# Patient Record
Sex: FEMALE | Race: WHITE | Employment: OTHER | URBAN - METROPOLITAN AREA
[De-identification: names, ages, dates, MRNs, and addresses within clinical notes are randomized per-mention and may not be internally consistent; named-entity substitution may affect disease eponyms.]

---

## 2017-03-02 LAB
ANTIBODY: REACTIVE
BUN BLDV-MCNC: 17 MG/DL
CALCIUM SERPL-MCNC: 9.3 MG/DL
CHLORIDE BLD-SCNC: 104 MMOL/L
CHOLESTEROL, TOTAL: 183 MG/DL
CHOLESTEROL/HDL RATIO: 3.7
CO2: 27 MMOL/L
CREAT SERPL-MCNC: 0.67 MG/DL
GFR CALCULATED: >60
GLUCOSE BLD-MCNC: 89 MG/DL
HDLC SERPL-MCNC: 50 MG/DL (ref 35–70)
LDL CHOLESTEROL CALCULATED: 108 MG/DL (ref 0–160)
POTASSIUM SERPL-SCNC: 3.9 MMOL/L
SODIUM BLD-SCNC: 139 MMOL/L
TRIGL SERPL-MCNC: 124 MG/DL
VLDLC SERPL CALC-MCNC: 25 MG/DL

## 2017-03-03 DIAGNOSIS — E78.5 HYPERLIPIDEMIA, UNSPECIFIED HYPERLIPIDEMIA TYPE: Chronic | ICD-10-CM

## 2017-03-03 DIAGNOSIS — I10 ESSENTIAL HYPERTENSION: ICD-10-CM

## 2017-03-07 DIAGNOSIS — Z13.9 SCREENING: ICD-10-CM

## 2017-03-17 ENCOUNTER — TELEPHONE (OUTPATIENT)
Dept: FAMILY MEDICINE CLINIC | Age: 62
End: 2017-03-17

## 2017-03-17 DIAGNOSIS — R76.8 POSITIVE HEPATITIS C ANTIBODY TEST: Primary | ICD-10-CM

## 2017-03-20 LAB — ANTIBODY: REACTIVE

## 2017-03-22 DIAGNOSIS — R76.8 POSITIVE HEPATITIS C ANTIBODY TEST: ICD-10-CM

## 2017-03-27 ENCOUNTER — OFFICE VISIT (OUTPATIENT)
Dept: FAMILY MEDICINE CLINIC | Age: 62
End: 2017-03-27
Payer: COMMERCIAL

## 2017-03-27 VITALS
HEART RATE: 80 BPM | DIASTOLIC BLOOD PRESSURE: 76 MMHG | SYSTOLIC BLOOD PRESSURE: 122 MMHG | WEIGHT: 195.6 LBS | BODY MASS INDEX: 38.2 KG/M2

## 2017-03-27 DIAGNOSIS — E78.5 HYPERLIPIDEMIA, UNSPECIFIED HYPERLIPIDEMIA TYPE: Primary | Chronic | ICD-10-CM

## 2017-03-27 DIAGNOSIS — I10 ESSENTIAL HYPERTENSION: ICD-10-CM

## 2017-03-27 DIAGNOSIS — J45.20 MILD INTERMITTENT ASTHMA WITHOUT COMPLICATION: ICD-10-CM

## 2017-03-27 PROCEDURE — 99214 OFFICE O/P EST MOD 30 MIN: CPT

## 2017-03-27 RX ORDER — OMEPRAZOLE 20 MG/1
40 CAPSULE, DELAYED RELEASE ORAL DAILY
COMMUNITY
Start: 2016-12-07 | End: 2018-05-07 | Stop reason: SDUPTHER

## 2017-03-27 ASSESSMENT — ENCOUNTER SYMPTOMS
FREQUENT THROAT CLEARING: 0
GASTROINTESTINAL NEGATIVE: 1
DIFFICULTY BREATHING: 0
SHORTNESS OF BREATH: 0
CHEST TIGHTNESS: 0
HOARSE VOICE: 0
BLURRED VISION: 0
COUGH: 0
WHEEZING: 0

## 2017-09-06 ENCOUNTER — OFFICE VISIT (OUTPATIENT)
Dept: FAMILY MEDICINE CLINIC | Age: 62
End: 2017-09-06
Payer: COMMERCIAL

## 2017-09-06 VITALS
DIASTOLIC BLOOD PRESSURE: 74 MMHG | HEIGHT: 61 IN | BODY MASS INDEX: 37.31 KG/M2 | HEART RATE: 86 BPM | SYSTOLIC BLOOD PRESSURE: 122 MMHG | OXYGEN SATURATION: 97 % | WEIGHT: 197.6 LBS

## 2017-09-06 DIAGNOSIS — J45.20 MILD INTERMITTENT ASTHMA WITHOUT COMPLICATION: ICD-10-CM

## 2017-09-06 DIAGNOSIS — J01.90 ACUTE BACTERIAL SINUSITIS: Primary | ICD-10-CM

## 2017-09-06 DIAGNOSIS — B96.89 ACUTE BACTERIAL SINUSITIS: Primary | ICD-10-CM

## 2017-09-06 DIAGNOSIS — R01.1 CARDIAC MURMUR: ICD-10-CM

## 2017-09-06 DIAGNOSIS — T78.2XXS ANAPHYLAXIS, SEQUELA: Chronic | ICD-10-CM

## 2017-09-06 PROBLEM — T78.2XXA ANAPHYLAXIS: Chronic | Status: ACTIVE | Noted: 2017-09-06

## 2017-09-06 PROCEDURE — 99214 OFFICE O/P EST MOD 30 MIN: CPT | Performed by: FAMILY MEDICINE

## 2017-09-06 RX ORDER — BUDESONIDE AND FORMOTEROL FUMARATE DIHYDRATE 160; 4.5 UG/1; UG/1
2 AEROSOL RESPIRATORY (INHALATION) 2 TIMES DAILY
COMMUNITY
End: 2018-01-05 | Stop reason: ALTCHOICE

## 2017-09-06 RX ORDER — AMOXICILLIN AND CLAVULANATE POTASSIUM 875; 125 MG/1; MG/1
1 TABLET, FILM COATED ORAL EVERY 12 HOURS
COMMUNITY
Start: 2017-09-02 | End: 2017-09-12

## 2017-09-06 ASSESSMENT — PATIENT HEALTH QUESTIONNAIRE - PHQ9
SUM OF ALL RESPONSES TO PHQ9 QUESTIONS 1 & 2: 0
SUM OF ALL RESPONSES TO PHQ QUESTIONS 1-9: 0
1. LITTLE INTEREST OR PLEASURE IN DOING THINGS: 0
2. FEELING DOWN, DEPRESSED OR HOPELESS: 0

## 2017-09-19 RX ORDER — POTASSIUM CHLORIDE 750 MG/1
10 CAPSULE, EXTENDED RELEASE ORAL 2 TIMES DAILY
Qty: 180 CAPSULE | Refills: 1 | Status: SHIPPED | OUTPATIENT
Start: 2017-09-19 | End: 2018-06-04 | Stop reason: SDUPTHER

## 2017-09-19 RX ORDER — HYDROCHLOROTHIAZIDE 50 MG/1
50 TABLET ORAL DAILY
Qty: 90 TABLET | Refills: 1 | Status: SHIPPED | OUTPATIENT
Start: 2017-09-19 | End: 2018-06-04 | Stop reason: SDUPTHER

## 2017-09-19 RX ORDER — LISINOPRIL 5 MG/1
5 TABLET ORAL DAILY
Qty: 90 TABLET | Refills: 1 | Status: SHIPPED | OUTPATIENT
Start: 2017-09-19 | End: 2018-06-04 | Stop reason: SDUPTHER

## 2017-09-21 LAB
ANTIBODY: NONREACTIVE
BUN BLDV-MCNC: 18 MG/DL
CALCIUM SERPL-MCNC: 9.6 MG/DL
CHLORIDE BLD-SCNC: 101 MMOL/L
CHOLESTEROL, TOTAL: 198 MG/DL
CHOLESTEROL/HDL RATIO: 3.4
CO2: 32 MMOL/L
CREAT SERPL-MCNC: 0.73 MG/DL
GFR CALCULATED: >60
GLUCOSE BLD-MCNC: 86 MG/DL
HDLC SERPL-MCNC: 59 MG/DL (ref 35–70)
LDL CHOLESTEROL CALCULATED: 122 MG/DL (ref 0–160)
POTASSIUM SERPL-SCNC: 3.5 MMOL/L
SODIUM BLD-SCNC: 140 MMOL/L
TRIGL SERPL-MCNC: 84 MG/DL
VLDLC SERPL CALC-MCNC: 17 MG/DL

## 2017-09-25 DIAGNOSIS — E78.5 HYPERLIPIDEMIA, UNSPECIFIED HYPERLIPIDEMIA TYPE: Chronic | ICD-10-CM

## 2017-09-25 DIAGNOSIS — I10 ESSENTIAL HYPERTENSION: ICD-10-CM

## 2017-09-27 ENCOUNTER — OFFICE VISIT (OUTPATIENT)
Dept: FAMILY MEDICINE CLINIC | Age: 62
End: 2017-09-27
Payer: COMMERCIAL

## 2017-09-27 VITALS
WEIGHT: 198 LBS | HEART RATE: 85 BPM | BODY MASS INDEX: 38.03 KG/M2 | DIASTOLIC BLOOD PRESSURE: 70 MMHG | SYSTOLIC BLOOD PRESSURE: 140 MMHG

## 2017-09-27 DIAGNOSIS — I10 ESSENTIAL HYPERTENSION: Primary | ICD-10-CM

## 2017-09-27 DIAGNOSIS — E78.5 HYPERLIPIDEMIA, UNSPECIFIED HYPERLIPIDEMIA TYPE: Chronic | ICD-10-CM

## 2017-09-27 DIAGNOSIS — Z23 IMMUNIZATION DUE: ICD-10-CM

## 2017-09-27 PROCEDURE — 99213 OFFICE O/P EST LOW 20 MIN: CPT | Performed by: FAMILY MEDICINE

## 2017-09-27 PROCEDURE — 90471 IMMUNIZATION ADMIN: CPT | Performed by: FAMILY MEDICINE

## 2017-09-27 PROCEDURE — 90688 IIV4 VACCINE SPLT 0.5 ML IM: CPT | Performed by: FAMILY MEDICINE

## 2017-09-27 ASSESSMENT — ENCOUNTER SYMPTOMS
COUGH: 0
NAUSEA: 0
DIARRHEA: 0
BACK PAIN: 0
SHORTNESS OF BREATH: 0
SORE THROAT: 0
SINUS PRESSURE: 0
VOMITING: 0

## 2017-10-13 ENCOUNTER — OFFICE VISIT (OUTPATIENT)
Dept: FAMILY MEDICINE CLINIC | Age: 62
End: 2017-10-13
Payer: COMMERCIAL

## 2017-10-13 VITALS
HEART RATE: 80 BPM | DIASTOLIC BLOOD PRESSURE: 70 MMHG | BODY MASS INDEX: 37.46 KG/M2 | WEIGHT: 195 LBS | SYSTOLIC BLOOD PRESSURE: 130 MMHG

## 2017-10-13 DIAGNOSIS — E78.5 HYPERLIPIDEMIA, UNSPECIFIED HYPERLIPIDEMIA TYPE: Chronic | ICD-10-CM

## 2017-10-13 DIAGNOSIS — S29.011A INTERCOSTAL MUSCLE STRAIN, INITIAL ENCOUNTER: Primary | ICD-10-CM

## 2017-10-13 PROCEDURE — 99213 OFFICE O/P EST LOW 20 MIN: CPT | Performed by: FAMILY MEDICINE

## 2017-10-13 ASSESSMENT — ENCOUNTER SYMPTOMS
COUGH: 0
SORE THROAT: 0
VOMITING: 0
SHORTNESS OF BREATH: 0
DIARRHEA: 0
SINUS PRESSURE: 0
BACK PAIN: 0
NAUSEA: 0

## 2017-10-13 NOTE — PROGRESS NOTES
Marta Talavera is a 57 y.o. female who presents today for her medical conditions/complaints as noted below. Marta Talavera is c/o of Back Pain (left upper outer quadrant)  Having some pain in back thoracic region gets episode of brief sharp pains lasting seconds. During these she has a lot of pain with deep inspiration. HPI:     Visit Information    Have you changed or started any medications since your last visit including any over-the-counter medicines, vitamins, or herbal medicines? no   Are you having any side effects from any of your medications? -  no  Have you stopped taking any of your medications? Is so, why? -  no    Have you seen any other physician or provider since your last visit? No  Have you had any other diagnostic tests since your last visit? No  Have you been seen in the emergency room and/or had an admission to a hospital since we last saw you? No  Have you had your routine dental cleaning in the past 6 months? yes -     Have you activated your [employer] account? If not, what are your barriers?  Yes     Patient Care Team:  Kathryn Beltran MD as PCP - General (Family Medicine)  Kathy Payan (Ophthalmology)  iSerra Cordoba MD as Consulting Physician (Rheumatology)  Joshua Tam MD as Consulting Physician (Gastroenterology)    Medical History Review  Past Medical, Family, and Social History reviewed and  contribute to the patient presenting condition    Health Maintenance   Topic Date Due    DTaP/Tdap/Td vaccine (1 - Tdap) 03/25/1972    Zostavax vaccine  03/25/2013    Cervical cancer screen  12/30/2017    Breast cancer screen  07/12/2018    Lipid screen  09/21/2022    Colon cancer screen colonoscopy  02/10/2026    Flu vaccine  Completed    Pneumococcal med risk  Completed    Hepatitis C screen  Completed    HIV screen  Completed       Past Medical History:   Diagnosis Date    Asthma     Hypertension     OA (osteoarthritis)     Obesity       Past Surgical History: Procedure Laterality Date    COLONOSCOPY      2006; 2016; due 2026     No family history on file. Social History   Substance Use Topics    Smoking status: Never Smoker    Smokeless tobacco: Never Used    Alcohol use Yes      Comment: rare       Current Outpatient Prescriptions   Medication Sig Dispense Refill    Lactobacillus (PROBIOTIC ACIDOPHILUS PO) Take by mouth      hydrochlorothiazide (HYDRODIURIL) 50 MG tablet Take 1 tablet by mouth daily 90 tablet 1    potassium chloride (MICRO-K) 10 MEQ extended release capsule Take 1 capsule by mouth 2 times daily 180 capsule 1    lisinopril (PRINIVIL;ZESTRIL) 5 MG tablet Take 1 tablet by mouth daily 90 tablet 1    budesonide-formoterol (SYMBICORT) 160-4.5 MCG/ACT AERO Inhale 2 puffs into the lungs 2 times daily      B Complex Vitamins (VITAMIN B COMPLEX PO) Take 1 capsule by mouth daily      omeprazole (PRILOSEC) 20 MG delayed release capsule Take 40 mg by mouth daily      EPIPEN 2-AMRITA 0.3 MG/0.3ML SOAJ injection prn      albuterol (PROVENTIL HFA) 108 (90 BASE) MCG/ACT inhaler Inhale 2 puffs into the lungs every 6 hours as needed for Wheezing 3 Inhaler 3     No current facility-administered medications for this visit. Allergies   Allergen Reactions    Daypro [Oxaprozin]     Tetracyclines & Related     Erythromycin Rash         Subjective:   Review of Systems   Constitutional: Negative for chills, diaphoresis and fever. HENT: Negative for congestion, sinus pressure and sore throat. Eyes: Negative for visual disturbance. Respiratory: Negative for cough and shortness of breath. Cardiovascular: Negative for chest pain and leg swelling. Gastrointestinal: Negative for diarrhea, nausea and vomiting. Genitourinary: Negative for dysuria, menstrual problem, urgency and vaginal discharge. Musculoskeletal: Negative for arthralgias, back pain and myalgias. Skin: Negative for rash. Neurological: Negative for weakness, numbness and headaches.

## 2017-11-16 DIAGNOSIS — R01.1 CARDIAC MURMUR: ICD-10-CM

## 2018-01-05 ENCOUNTER — OFFICE VISIT (OUTPATIENT)
Dept: FAMILY MEDICINE CLINIC | Age: 63
End: 2018-01-05
Payer: COMMERCIAL

## 2018-01-05 VITALS
SYSTOLIC BLOOD PRESSURE: 122 MMHG | HEART RATE: 92 BPM | DIASTOLIC BLOOD PRESSURE: 60 MMHG | BODY MASS INDEX: 40.34 KG/M2 | WEIGHT: 210 LBS

## 2018-01-05 DIAGNOSIS — E78.5 HYPERLIPIDEMIA, UNSPECIFIED HYPERLIPIDEMIA TYPE: Chronic | ICD-10-CM

## 2018-01-05 DIAGNOSIS — I10 ESSENTIAL HYPERTENSION: ICD-10-CM

## 2018-01-05 DIAGNOSIS — H65.02 ACUTE SEROUS OTITIS MEDIA OF LEFT EAR, RECURRENCE NOT SPECIFIED: ICD-10-CM

## 2018-01-05 DIAGNOSIS — H92.02 LEFT EAR PAIN: Primary | ICD-10-CM

## 2018-01-05 PROCEDURE — 99213 OFFICE O/P EST LOW 20 MIN: CPT | Performed by: FAMILY MEDICINE

## 2018-01-05 RX ORDER — AMOXICILLIN 875 MG/1
875 TABLET, COATED ORAL 2 TIMES DAILY
Qty: 20 TABLET | Refills: 0 | Status: SHIPPED | OUTPATIENT
Start: 2018-01-05 | End: 2018-01-15

## 2018-01-05 ASSESSMENT — ENCOUNTER SYMPTOMS
SINUS PRESSURE: 0
COUGH: 0
NAUSEA: 0
VOMITING: 0
SHORTNESS OF BREATH: 0
DIARRHEA: 0
SORE THROAT: 0
BACK PAIN: 0

## 2018-01-05 NOTE — PROGRESS NOTES
COLONOSCOPY      2006; 2016; due 2026     No family history on file. Social History   Substance Use Topics    Smoking status: Never Smoker    Smokeless tobacco: Never Used    Alcohol use Yes      Comment: rare       Current Outpatient Prescriptions   Medication Sig Dispense Refill    amoxicillin (AMOXIL) 875 MG tablet Take 1 tablet by mouth 2 times daily for 10 days 20 tablet 0    Lactobacillus (PROBIOTIC ACIDOPHILUS PO) Take by mouth      hydrochlorothiazide (HYDRODIURIL) 50 MG tablet Take 1 tablet by mouth daily 90 tablet 1    potassium chloride (MICRO-K) 10 MEQ extended release capsule Take 1 capsule by mouth 2 times daily 180 capsule 1    lisinopril (PRINIVIL;ZESTRIL) 5 MG tablet Take 1 tablet by mouth daily 90 tablet 1    B Complex Vitamins (VITAMIN B COMPLEX PO) Take 1 capsule by mouth daily      omeprazole (PRILOSEC) 20 MG delayed release capsule Take 40 mg by mouth daily      EPIPEN 2-AMRITA 0.3 MG/0.3ML SOAJ injection prn      albuterol (PROVENTIL HFA) 108 (90 BASE) MCG/ACT inhaler Inhale 2 puffs into the lungs every 6 hours as needed for Wheezing 3 Inhaler 3     No current facility-administered medications for this visit. Allergies   Allergen Reactions    Daypro [Oxaprozin]     Tetracyclines & Related     Erythromycin Rash         Subjective:   Review of Systems   Constitutional: Negative for chills, diaphoresis and fever. HENT: Positive for ear pain and hearing loss. Negative for congestion, sinus pressure and sore throat. Eyes: Negative for visual disturbance. Respiratory: Negative for cough and shortness of breath. Cardiovascular: Negative for chest pain and leg swelling. Gastrointestinal: Negative for diarrhea, nausea and vomiting. Genitourinary: Negative for dysuria, menstrual problem, urgency and vaginal discharge. Musculoskeletal: Negative for arthralgias, back pain and myalgias. Skin: Negative for rash.    Neurological: Negative for weakness, numbness and headaches. Psychiatric/Behavioral: Negative for sleep disturbance. Objective:   /60   Pulse 92   Wt 210 lb (95.3 kg)   BMI 40.34 kg/m²     Physical Exam   Constitutional: She is oriented to person, place, and time. She appears well-developed and well-nourished. No distress. HENT:   Head: Normocephalic and atraumatic. Right Ear: External ear normal. Tympanic membrane is not injected, not scarred, not retracted and not bulging. No middle ear effusion. Left Ear: There is tenderness. No drainage. Tympanic membrane is scarred. Tympanic membrane is not perforated, not erythematous, not retracted and not bulging. A middle ear effusion is present. Mouth/Throat: No oropharyngeal exudate. Eyes: No scleral icterus. Neck: Neck supple. Carotid bruit is not present. Cardiovascular: Exam reveals no gallop and no friction rub. No murmur heard. Pulmonary/Chest: No respiratory distress. She has no wheezes. She has no rales. She exhibits no tenderness. Musculoskeletal: She exhibits no edema. Lymphadenopathy:     She has no cervical adenopathy. Neurological: She is alert and oriented to person, place, and time. No cranial nerve deficit. Skin: No rash noted. She is not diaphoretic. Psychiatric: She has a normal mood and affect. Her behavior is normal. Judgment and thought content normal.       Assessment:      1. Left ear pain  amoxicillin (AMOXIL) 875 MG tablet   2. Acute serous otitis media of left ear, recurrence not specified  amoxicillin (AMOXIL) 875 MG tablet   3. Hyperlipidemia, unspecified hyperlipidemia type  CBC Auto Differential    Comprehensive Metabolic Panel    Lipid Panel   4. Essential hypertension           Plan:      Return in about 6 months (around 7/5/2018).     Orders Placed This Encounter   Procedures    CBC Auto Differential     Standing Status:   Future     Standing Expiration Date:   1/5/2019    Comprehensive Metabolic Panel     Standing Status:   Future     Standing

## 2018-02-27 ENCOUNTER — OFFICE VISIT (OUTPATIENT)
Dept: FAMILY MEDICINE CLINIC | Age: 63
End: 2018-02-27
Payer: COMMERCIAL

## 2018-02-27 VITALS
BODY MASS INDEX: 40.72 KG/M2 | WEIGHT: 212 LBS | SYSTOLIC BLOOD PRESSURE: 120 MMHG | HEART RATE: 90 BPM | DIASTOLIC BLOOD PRESSURE: 70 MMHG

## 2018-02-27 DIAGNOSIS — K13.79 ORAL BLEEDING: Primary | ICD-10-CM

## 2018-02-27 PROCEDURE — 99213 OFFICE O/P EST LOW 20 MIN: CPT | Performed by: FAMILY MEDICINE

## 2018-02-27 ASSESSMENT — ENCOUNTER SYMPTOMS
COUGH: 0
BACK PAIN: 0
NAUSEA: 0
DIARRHEA: 0
SINUS PRESSURE: 0
VOMITING: 0
SHORTNESS OF BREATH: 0
SORE THROAT: 0

## 2018-02-27 NOTE — PROGRESS NOTES
Negative for diarrhea, nausea and vomiting. Genitourinary: Negative for dysuria, menstrual problem, urgency and vaginal discharge. Musculoskeletal: Negative for arthralgias, back pain and myalgias. Skin: Negative for rash. Neurological: Negative for weakness, numbness and headaches. Psychiatric/Behavioral: Negative for sleep disturbance. Objective:   /70   Pulse 90   Wt 212 lb (96.2 kg)   BMI 40.72 kg/m²     Physical Exam   Constitutional: She is oriented to person, place, and time. She appears well-developed and well-nourished. No distress. HENT:   Head: Normocephalic and atraumatic. Mouth/Throat: No oral lesions. Normal dentition. No dental abscesses, uvula swelling, lacerations or dental caries. No oropharyngeal exudate. Eyes: No scleral icterus. Neck: Neck supple. Carotid bruit is not present. Cardiovascular: Exam reveals no gallop and no friction rub. No murmur heard. Pulmonary/Chest: No respiratory distress. She has no wheezes. She has no rales. She exhibits no tenderness. Musculoskeletal: She exhibits no edema. Lymphadenopathy:     She has no cervical adenopathy. Neurological: She is alert and oriented to person, place, and time. No cranial nerve deficit. Skin: No rash noted. She is not diaphoretic. Psychiatric: She has a normal mood and affect. Her behavior is normal. Judgment and thought content normal.       Assessment:      1. Oral bleeding  CBC Auto Differential    Sedimentation rate, automated    Protime-INR    APTT         Plan:      Return if symptoms worsen or fail to improve.     Orders Placed This Encounter   Procedures    CBC Auto Differential     Standing Status:   Future     Standing Expiration Date:   2/27/2019    Sedimentation rate, automated     Standing Status:   Future     Standing Expiration Date:   2/27/2019    Protime-INR     Standing Status:   Future     Standing Expiration Date:   2/27/2019     Order Specific Question:   Daily Coumadin Dose?     Answer:   0    APTT     Standing Status:   Future     Standing Expiration Date:   2/27/2019     Order Specific Question:   Daily Heparin Dose? Answer:   0     No orders of the defined types were placed in this encounter. I encouraged her to follow up with her dentist for her bleeding issue and to schedule an exam with her OB/Gyn doctor for her pelvic pain issue.

## 2018-05-07 RX ORDER — OMEPRAZOLE 20 MG/1
40 CAPSULE, DELAYED RELEASE ORAL DAILY
Qty: 30 CAPSULE | Refills: 5 | Status: SHIPPED | OUTPATIENT
Start: 2018-05-07 | End: 2018-06-04 | Stop reason: SDUPTHER

## 2018-06-04 RX ORDER — POTASSIUM CHLORIDE 750 MG/1
10 CAPSULE, EXTENDED RELEASE ORAL 2 TIMES DAILY
Qty: 180 CAPSULE | Refills: 1 | Status: SHIPPED | OUTPATIENT
Start: 2018-06-04 | End: 2018-12-17 | Stop reason: SDUPTHER

## 2018-06-04 RX ORDER — LISINOPRIL 5 MG/1
5 TABLET ORAL DAILY
Qty: 90 TABLET | Refills: 1 | Status: SHIPPED | OUTPATIENT
Start: 2018-06-04 | End: 2018-12-17 | Stop reason: SDUPTHER

## 2018-06-04 RX ORDER — ALBUTEROL SULFATE 90 UG/1
2 AEROSOL, METERED RESPIRATORY (INHALATION) EVERY 6 HOURS PRN
Qty: 3 INHALER | Refills: 3 | Status: SHIPPED | OUTPATIENT
Start: 2018-06-04 | End: 2018-12-17 | Stop reason: SDUPTHER

## 2018-06-04 RX ORDER — OMEPRAZOLE 20 MG/1
40 CAPSULE, DELAYED RELEASE ORAL DAILY
Qty: 30 CAPSULE | Refills: 5 | Status: SHIPPED | OUTPATIENT
Start: 2018-06-04 | End: 2018-12-17 | Stop reason: SDUPTHER

## 2018-06-04 RX ORDER — EPINEPHRINE 0.3 MG/.3ML
INJECTION INTRAMUSCULAR
Qty: 1 EACH | Refills: 2 | Status: SHIPPED | OUTPATIENT
Start: 2018-06-04 | End: 2020-02-24

## 2018-06-04 RX ORDER — HYDROCHLOROTHIAZIDE 50 MG/1
50 TABLET ORAL DAILY
Qty: 90 TABLET | Refills: 1 | Status: SHIPPED | OUTPATIENT
Start: 2018-06-04 | End: 2018-12-17 | Stop reason: SDUPTHER

## 2018-07-10 LAB
ALBUMIN SERPL-MCNC: 4 G/DL
ALP BLD-CCNC: 116 U/L
ALT SERPL-CCNC: 18 U/L
ANION GAP SERPL CALCULATED.3IONS-SCNC: 10 MMOL/L
AST SERPL-CCNC: 18 U/L
BASOPHILS ABSOLUTE: 0.1 /ΜL
BASOPHILS RELATIVE PERCENT: 0.8 %
BILIRUB SERPL-MCNC: 1 MG/DL (ref 0.1–1.4)
BUN BLDV-MCNC: 17 MG/DL
CALCIUM SERPL-MCNC: 9.4 MG/DL
CHLORIDE BLD-SCNC: 102 MMOL/L
CHOLESTEROL, TOTAL: 175 MG/DL
CHOLESTEROL/HDL RATIO: 3.4
CO2: 27 MMOL/L
CREAT SERPL-MCNC: 0.74 MG/DL
EOSINOPHILS ABSOLUTE: 0.3 /ΜL
EOSINOPHILS RELATIVE PERCENT: 4.2 %
GFR CALCULATED: >60
GLUCOSE BLD-MCNC: 89 MG/DL
HCT VFR BLD CALC: 40.4 % (ref 36–46)
HDLC SERPL-MCNC: 51 MG/DL (ref 35–70)
HEMOGLOBIN: 13.7 G/DL (ref 12–16)
LDL CHOLESTEROL CALCULATED: 101 MG/DL (ref 0–160)
LYMPHOCYTES ABSOLUTE: 1.4 /ΜL
LYMPHOCYTES RELATIVE PERCENT: 21.8 %
MCH RBC QN AUTO: 28.7 PG
MCHC RBC AUTO-ENTMCNC: 33.8 G/DL
MCV RBC AUTO: 85 FL
MONOCYTES ABSOLUTE: 0.4 /ΜL
MONOCYTES RELATIVE PERCENT: 6.6 %
NEUTROPHILS ABSOLUTE: 4.3 /ΜL
NEUTROPHILS RELATIVE PERCENT: 66.6 %
PDW BLD-RTO: 13.8 %
PLATELET # BLD: 217 K/ΜL
PMV BLD AUTO: 10.1 FL
POTASSIUM SERPL-SCNC: 3.6 MMOL/L
RBC # BLD: 4.76 10^6/ΜL
SODIUM BLD-SCNC: 139 MMOL/L
TOTAL PROTEIN: 7.3
TRIGL SERPL-MCNC: 117 MG/DL
VLDLC SERPL CALC-MCNC: 23 MG/DL
WBC # BLD: 6.4 10^3/ML

## 2018-07-11 ENCOUNTER — OFFICE VISIT (OUTPATIENT)
Dept: FAMILY MEDICINE CLINIC | Age: 63
End: 2018-07-11
Payer: MEDICARE

## 2018-07-11 VITALS
SYSTOLIC BLOOD PRESSURE: 124 MMHG | WEIGHT: 206 LBS | DIASTOLIC BLOOD PRESSURE: 70 MMHG | HEART RATE: 89 BPM | BODY MASS INDEX: 39.57 KG/M2

## 2018-07-11 DIAGNOSIS — J45.909 MILD ASTHMA WITHOUT COMPLICATION, UNSPECIFIED WHETHER PERSISTENT: ICD-10-CM

## 2018-07-11 DIAGNOSIS — M19.90 OSTEOARTHRITIS, UNSPECIFIED OSTEOARTHRITIS TYPE, UNSPECIFIED SITE: ICD-10-CM

## 2018-07-11 DIAGNOSIS — Z23 IMMUNIZATION DUE: ICD-10-CM

## 2018-07-11 DIAGNOSIS — E78.5 HYPERLIPIDEMIA, UNSPECIFIED HYPERLIPIDEMIA TYPE: Chronic | ICD-10-CM

## 2018-07-11 DIAGNOSIS — I10 ESSENTIAL HYPERTENSION: Primary | ICD-10-CM

## 2018-07-11 PROCEDURE — 90632 HEPA VACCINE ADULT IM: CPT | Performed by: FAMILY MEDICINE

## 2018-07-11 PROCEDURE — 99213 OFFICE O/P EST LOW 20 MIN: CPT | Performed by: FAMILY MEDICINE

## 2018-07-11 PROCEDURE — 90471 IMMUNIZATION ADMIN: CPT | Performed by: FAMILY MEDICINE

## 2018-07-11 ASSESSMENT — ENCOUNTER SYMPTOMS
VOMITING: 0
SORE THROAT: 0
COUGH: 0
DIARRHEA: 0
NAUSEA: 0
SINUS PRESSURE: 0
SHORTNESS OF BREATH: 0
BACK PAIN: 0

## 2018-07-11 NOTE — PROGRESS NOTES
Completed    HIV screen  Completed       Past Medical History:   Diagnosis Date    Asthma     Hypertension     OA (osteoarthritis)     Obesity       Past Surgical History:   Procedure Laterality Date    COLONOSCOPY      2006; 2016; due 2026     No family history on file. Social History   Substance Use Topics    Smoking status: Never Smoker    Smokeless tobacco: Never Used    Alcohol use Yes      Comment: rare       Current Outpatient Prescriptions   Medication Sig Dispense Refill    lisinopril (PRINIVIL;ZESTRIL) 5 MG tablet Take 1 tablet by mouth daily 90 tablet 1    potassium chloride (MICRO-K) 10 MEQ extended release capsule Take 1 capsule by mouth 2 times daily 180 capsule 1    hydrochlorothiazide (HYDRODIURIL) 50 MG tablet Take 1 tablet by mouth daily 90 tablet 1    omeprazole (PRILOSEC) 20 MG delayed release capsule Take 2 capsules by mouth daily (Patient taking differently: Take 20 mg by mouth Daily ) 30 capsule 5    albuterol sulfate HFA (PROVENTIL HFA) 108 (90 Base) MCG/ACT inhaler Inhale 2 puffs into the lungs every 6 hours as needed for Wheezing 3 Inhaler 3    EPIPEN 2-AMRITA 0.3 MG/0.3ML SOAJ injection prn 1 each 2    Lactobacillus (PROBIOTIC ACIDOPHILUS PO) Take by mouth       No current facility-administered medications for this visit. Allergies   Allergen Reactions    Daypro [Oxaprozin]     Tetracyclines & Related     Erythromycin Rash         Subjective:   Review of Systems   Constitutional: Negative for chills, diaphoresis and fever. HENT: Negative for congestion, sinus pressure and sore throat. Eyes: Negative for visual disturbance. Respiratory: Negative for cough and shortness of breath. Cardiovascular: Negative for chest pain and leg swelling. Gastrointestinal: Negative for diarrhea, nausea and vomiting. Genitourinary: Positive for vaginal bleeding. Negative for dysuria, menstrual problem, urgency and vaginal discharge.    Musculoskeletal: Negative for

## 2018-07-23 DIAGNOSIS — I10 ESSENTIAL HYPERTENSION: ICD-10-CM

## 2018-07-23 DIAGNOSIS — E78.5 HYPERLIPIDEMIA, UNSPECIFIED HYPERLIPIDEMIA TYPE: Chronic | ICD-10-CM

## 2018-09-07 ENCOUNTER — TELEPHONE (OUTPATIENT)
Dept: FAMILY MEDICINE CLINIC | Age: 63
End: 2018-09-07

## 2018-09-07 NOTE — TELEPHONE ENCOUNTER
Typhoid vaccine is given in four doses qod for eight days and should be completed at least one week before leaving to at risk area.   Yes malarial prophylaxis can be used but depends on where she is traveling to, for how long, season of year etc.  I really think an appt prior to travel is best to assess all that needs to be done, Rx can be provided etc.

## 2018-10-01 ENCOUNTER — NURSE ONLY (OUTPATIENT)
Dept: FAMILY MEDICINE CLINIC | Age: 63
End: 2018-10-01
Payer: MEDICARE

## 2018-10-01 ENCOUNTER — TELEPHONE (OUTPATIENT)
Dept: FAMILY MEDICINE CLINIC | Age: 63
End: 2018-10-01

## 2018-10-01 DIAGNOSIS — Z23 IMMUNIZATION DUE: Primary | ICD-10-CM

## 2018-10-01 PROCEDURE — 90688 IIV4 VACCINE SPLT 0.5 ML IM: CPT | Performed by: FAMILY MEDICINE

## 2018-10-01 PROCEDURE — G0010 ADMIN HEPATITIS B VACCINE: HCPCS | Performed by: FAMILY MEDICINE

## 2018-10-01 PROCEDURE — G0008 ADMIN INFLUENZA VIRUS VAC: HCPCS | Performed by: FAMILY MEDICINE

## 2018-10-01 PROCEDURE — 90746 HEPB VACCINE 3 DOSE ADULT IM: CPT | Performed by: FAMILY MEDICINE

## 2018-10-01 RX ORDER — DOXYCYCLINE HYCLATE 100 MG
TABLET ORAL
Qty: 30 TABLET | Refills: 0 | Status: SHIPPED | OUTPATIENT
Start: 2018-10-01 | End: 2018-12-11 | Stop reason: ALTCHOICE

## 2018-10-01 NOTE — TELEPHONE ENCOUNTER
Patient is going to [place] the end of October and she needs the medication for the typhoid vaccine and the [facility] can you please call it into the pharmacy please advise

## 2018-10-08 ENCOUNTER — OFFICE VISIT (OUTPATIENT)
Dept: FAMILY MEDICINE CLINIC | Age: 63
End: 2018-10-08
Payer: MEDICARE

## 2018-10-08 VITALS
WEIGHT: 206 LBS | DIASTOLIC BLOOD PRESSURE: 70 MMHG | BODY MASS INDEX: 39.57 KG/M2 | HEART RATE: 79 BPM | SYSTOLIC BLOOD PRESSURE: 124 MMHG

## 2018-10-08 DIAGNOSIS — L30.9 ECZEMA, UNSPECIFIED TYPE: ICD-10-CM

## 2018-10-08 DIAGNOSIS — Z23 IMMUNIZATION DUE: ICD-10-CM

## 2018-10-08 DIAGNOSIS — I10 ESSENTIAL HYPERTENSION: ICD-10-CM

## 2018-10-08 DIAGNOSIS — Z71.84 TRAVEL ADVICE ENCOUNTER: Primary | ICD-10-CM

## 2018-10-08 DIAGNOSIS — A09 TRAVELER'S DIARRHEA: ICD-10-CM

## 2018-10-08 PROCEDURE — 99213 OFFICE O/P EST LOW 20 MIN: CPT | Performed by: FAMILY MEDICINE

## 2018-10-08 PROCEDURE — 90670 PCV13 VACCINE IM: CPT | Performed by: FAMILY MEDICINE

## 2018-10-08 PROCEDURE — G0009 ADMIN PNEUMOCOCCAL VACCINE: HCPCS | Performed by: FAMILY MEDICINE

## 2018-10-08 RX ORDER — FLUOCINONIDE 0.5 MG/G
OINTMENT TOPICAL
Qty: 60 G | Refills: 1 | Status: SHIPPED | OUTPATIENT
Start: 2018-10-08 | End: 2018-10-15

## 2018-10-08 RX ORDER — CIPROFLOXACIN 500 MG/1
500 TABLET, FILM COATED ORAL 2 TIMES DAILY
Qty: 20 TABLET | Refills: 0 | Status: SHIPPED | OUTPATIENT
Start: 2018-10-08 | End: 2018-10-18

## 2018-10-08 ASSESSMENT — PATIENT HEALTH QUESTIONNAIRE - PHQ9
1. LITTLE INTEREST OR PLEASURE IN DOING THINGS: 0
SUM OF ALL RESPONSES TO PHQ QUESTIONS 1-9: 0
SUM OF ALL RESPONSES TO PHQ QUESTIONS 1-9: 0
2. FEELING DOWN, DEPRESSED OR HOPELESS: 0
SUM OF ALL RESPONSES TO PHQ9 QUESTIONS 1 & 2: 0

## 2018-10-08 ASSESSMENT — ENCOUNTER SYMPTOMS
DIARRHEA: 0
SINUS PRESSURE: 0
SHORTNESS OF BREATH: 0
COUGH: 0
SORE THROAT: 0
NAUSEA: 0
BACK PAIN: 0
VOMITING: 0

## 2018-10-08 NOTE — PROGRESS NOTES
Caren Isabel is a 70 y.o. female who presents today for her medical conditions/complaints as noted below. Caren Isabel is c/o of Hypertension and Other (leaving for [place]-would like to discuss vaccines)    Routine follow up for travel consultation. Going to [facility] and Rx have been sent to her pharmacy. HPI:     Visit Information    Have you changed or started any medications since your last visit including any over-the-counter medicines, vitamins, or herbal medicines? no   Are you having any side effects from any of your medications? -  no  Have you stopped taking any of your medications? Is so, why? -  no    Have you seen any other physician or provider since your last visit? No  Have you had any other diagnostic tests since your last visit? No  Have you been seen in the emergency room and/or had an admission to a hospital since we last saw you? No  Have you had your routine dental cleaning in the past 6 months? no    Have you activated your [employer] system account? If not, what are your barriers?  Yes     Patient Care Team:  Prema Jack MD as PCP - General (Family Medicine)  Bruna Xavier (Ophthalmology)  Helen José MD as Consulting Physician (Rheumatology)  Travis Crooks MD as Consulting Physician (Gastroenterology)    Medical History Review  Past Medical, Family, and Social History reviewed and  contribute to the patient presenting condition    Health Maintenance   Topic Date Due    Shingles Vaccine (1 of 2 - 2 Dose Series) 11/11/2010    Cervical cancer screen  12/30/2017    Pneumococcal low/med risk (1 of 2 - PCV13) 03/25/2018    Breast cancer screen  07/12/2018    Potassium monitoring  07/10/2019    Creatinine monitoring  07/10/2019    Lipid screen  07/10/2023    Colon cancer screen colonoscopy  02/10/2026    DTaP/Tdap/Td vaccine (2 - Td) 07/11/2028    DEXA (modify frequency per FRAX score)  Completed    Flu vaccine  Completed    Hepatitis C screen  Completed    HIV screen Completed       Past Medical History:   Diagnosis Date    Asthma     Hypertension     OA (osteoarthritis)     Obesity       Past Surgical History:   Procedure Laterality Date    COLONOSCOPY      2006; 2016; due 2026     No family history on file. Social History   Substance Use Topics    Smoking status: Never Smoker    Smokeless tobacco: Never Used    Alcohol use Yes      Comment: rare       Current Outpatient Prescriptions   Medication Sig Dispense Refill    fluocinonide (LIDEX) 0.05 % ointment Apply topically 2 times daily. 60 g 1    ciprofloxacin (CIPRO) 500 MG tablet Take 1 tablet by mouth 2 times daily for 10 days 20 tablet 0    doxycycline hyclate (VIBRA-TABS) 100 MG tablet Qd start 2 days before leaving then 4 weeks upon return 30 tablet 0    typhoid vaccine (VIVOTIF) delayed release capsule 1 cap qod for 4 doses complete 4 capsule 0    lisinopril (PRINIVIL;ZESTRIL) 5 MG tablet Take 1 tablet by mouth daily 90 tablet 1    potassium chloride (MICRO-K) 10 MEQ extended release capsule Take 1 capsule by mouth 2 times daily 180 capsule 1    hydrochlorothiazide (HYDRODIURIL) 50 MG tablet Take 1 tablet by mouth daily 90 tablet 1    omeprazole (PRILOSEC) 20 MG delayed release capsule Take 2 capsules by mouth daily (Patient taking differently: Take 20 mg by mouth Daily ) 30 capsule 5    albuterol sulfate HFA (PROVENTIL HFA) 108 (90 Base) MCG/ACT inhaler Inhale 2 puffs into the lungs every 6 hours as needed for Wheezing 3 Inhaler 3    EPIPEN 2-AMRITA 0.3 MG/0.3ML SOAJ injection prn 1 each 2    Lactobacillus (PROBIOTIC ACIDOPHILUS PO) Take by mouth       No current facility-administered medications for this visit. Allergies   Allergen Reactions    Daypro [Oxaprozin]     Tetracyclines & Related     Erythromycin Rash         Subjective:   Review of Systems   Constitutional: Negative for chills, diaphoresis and fever. HENT: Negative for congestion, sinus pressure and sore throat.     Eyes: Negative

## 2018-10-12 ENCOUNTER — TELEPHONE (OUTPATIENT)
Dept: FAMILY MEDICINE CLINIC | Age: 63
End: 2018-10-12

## 2018-10-12 RX ORDER — CHLOROQUINE PHOSPHATE 500 MG/1
500 TABLET, FILM COATED ORAL WEEKLY
Qty: 8 TABLET | Refills: 0 | Status: SHIPPED | OUTPATIENT
Start: 2018-10-12 | End: 2018-12-01

## 2018-10-12 NOTE — TELEPHONE ENCOUNTER
Sorry 300 mg is equivalent to the 500 mg 300 mg chloroquine base is equal to 500 mg chloroquine phosphate.

## 2018-12-11 ENCOUNTER — OFFICE VISIT (OUTPATIENT)
Dept: FAMILY MEDICINE CLINIC | Age: 63
End: 2018-12-11
Payer: MEDICARE

## 2018-12-11 VITALS
OXYGEN SATURATION: 95 % | BODY MASS INDEX: 39.19 KG/M2 | SYSTOLIC BLOOD PRESSURE: 122 MMHG | HEART RATE: 82 BPM | RESPIRATION RATE: 16 BRPM | DIASTOLIC BLOOD PRESSURE: 70 MMHG | WEIGHT: 204 LBS

## 2018-12-11 DIAGNOSIS — J45.21 MILD INTERMITTENT ASTHMA WITH ACUTE EXACERBATION: ICD-10-CM

## 2018-12-11 DIAGNOSIS — J06.9 VIRAL URI: Primary | ICD-10-CM

## 2018-12-11 DIAGNOSIS — L75.0 URINARY BODY ODOR: ICD-10-CM

## 2018-12-11 LAB
BILIRUBIN, POC: NORMAL
BLOOD URINE, POC: NORMAL
CLARITY, POC: CLEAR
COLOR, POC: YELLOW
GLUCOSE URINE, POC: NORMAL
KETONES, POC: NORMAL
LEUKOCYTE EST, POC: NORMAL
NITRITE, POC: NORMAL
PH, POC: 6
PROTEIN, POC: NORMAL
SPECIFIC GRAVITY, POC: 1.02
UROBILINOGEN, POC: 0.2

## 2018-12-11 PROCEDURE — G8417 CALC BMI ABV UP PARAM F/U: HCPCS | Performed by: NURSE PRACTITIONER

## 2018-12-11 PROCEDURE — 4040F PNEUMOC VAC/ADMIN/RCVD: CPT | Performed by: NURSE PRACTITIONER

## 2018-12-11 PROCEDURE — 94640 AIRWAY INHALATION TREATMENT: CPT | Performed by: NURSE PRACTITIONER

## 2018-12-11 PROCEDURE — 1090F PRES/ABSN URINE INCON ASSESS: CPT | Performed by: NURSE PRACTITIONER

## 2018-12-11 PROCEDURE — G8482 FLU IMMUNIZE ORDER/ADMIN: HCPCS | Performed by: NURSE PRACTITIONER

## 2018-12-11 PROCEDURE — 81003 URINALYSIS AUTO W/O SCOPE: CPT | Performed by: NURSE PRACTITIONER

## 2018-12-11 PROCEDURE — 99214 OFFICE O/P EST MOD 30 MIN: CPT | Performed by: NURSE PRACTITIONER

## 2018-12-11 PROCEDURE — 1036F TOBACCO NON-USER: CPT | Performed by: NURSE PRACTITIONER

## 2018-12-11 PROCEDURE — G8427 DOCREV CUR MEDS BY ELIG CLIN: HCPCS | Performed by: NURSE PRACTITIONER

## 2018-12-11 PROCEDURE — 1123F ACP DISCUSS/DSCN MKR DOCD: CPT | Performed by: NURSE PRACTITIONER

## 2018-12-11 PROCEDURE — 3017F COLORECTAL CA SCREEN DOC REV: CPT | Performed by: NURSE PRACTITIONER

## 2018-12-11 PROCEDURE — 1101F PT FALLS ASSESS-DOCD LE1/YR: CPT | Performed by: NURSE PRACTITIONER

## 2018-12-11 PROCEDURE — G8400 PT W/DXA NO RESULTS DOC: HCPCS | Performed by: NURSE PRACTITIONER

## 2018-12-11 RX ORDER — ALBUTEROL SULFATE 2.5 MG/3ML
2.5 SOLUTION RESPIRATORY (INHALATION) EVERY 6 HOURS PRN
Qty: 120 VIAL | Refills: 1 | Status: SHIPPED | OUTPATIENT
Start: 2018-12-11 | End: 2020-03-16 | Stop reason: SDUPTHER

## 2018-12-11 RX ORDER — ALBUTEROL SULFATE 1.25 MG/3ML
1 SOLUTION RESPIRATORY (INHALATION) EVERY 6 HOURS PRN
Qty: 360 ML | Refills: 1 | Status: SHIPPED | OUTPATIENT
Start: 2018-12-11 | End: 2018-12-11 | Stop reason: CLARIF

## 2018-12-11 RX ORDER — AMOXICILLIN 500 MG/1
CAPSULE ORAL
COMMUNITY
Start: 2018-12-10 | End: 2018-12-11 | Stop reason: ALTCHOICE

## 2018-12-11 RX ORDER — METHYLPREDNISOLONE 4 MG/1
4 TABLET ORAL DAILY
COMMUNITY
Start: 2018-12-10 | End: 2018-12-20 | Stop reason: ALTCHOICE

## 2018-12-11 RX ORDER — IPRATROPIUM BROMIDE AND ALBUTEROL SULFATE 2.5; .5 MG/3ML; MG/3ML
1 SOLUTION RESPIRATORY (INHALATION) ONCE
Status: COMPLETED | OUTPATIENT
Start: 2018-12-11 | End: 2018-12-11

## 2018-12-11 RX ADMIN — IPRATROPIUM BROMIDE AND ALBUTEROL SULFATE 1 AMPULE: 2.5; .5 SOLUTION RESPIRATORY (INHALATION) at 11:50

## 2018-12-11 ASSESSMENT — ENCOUNTER SYMPTOMS
WHEEZING: 1
VOMITING: 0
CHOKING: 0
EYES NEGATIVE: 1
SHORTNESS OF BREATH: 1
SINUS PRESSURE: 0
CHEST TIGHTNESS: 1
COUGH: 1
GASTROINTESTINAL NEGATIVE: 1
NAUSEA: 0
SORE THROAT: 1
ABDOMINAL PAIN: 0
TROUBLE SWALLOWING: 0
DIARRHEA: 0
SINUS PAIN: 0
STRIDOR: 0

## 2018-12-11 NOTE — PROGRESS NOTES
[facility]  [address]  Dept: [phone]    Visit Information    Have you changed or started any medications since your last visit including any over-the-counter medicines, vitamins, or herbal medicines? no   Are you having any side effects from any of your medications? -  no  Have you stopped taking any of your medications? Is so, why? -  no    Have you seen any other physician or provider since your last visit? Yes - Records Obtained  Have you had any other diagnostic tests since your last visit? No  Have you been seen in the emergency room and/or had an admission to a hospital since we last saw you? Yes - Records Obtained  Have you had your routine dental cleaning in the past 6 months? no    Have you activated your [employer] account? If not, what are your barriers? Yes     Patient Care Team:  Carla Roca MD as PCP - General (Family Medicine)  Sylvester Travis (Ophthalmology)  Shira Mcarthur MD as Consulting Physician (Rheumatology)  Swapna Carrillo MD as Consulting Physician (Gastroenterology)    Medical History Review  Past Medical, Family, and Social History reviewed and does not contribute to the patient presenting condition    Health Maintenance   Topic Date Due    Shingles Vaccine (1 of 2 - 2 Dose Series) 11/11/2010    Cervical cancer screen  12/30/2017    Breast cancer screen  07/12/2018    Potassium monitoring  07/10/2019    Creatinine monitoring  07/10/2019    Pneumococcal low/med risk (2 of 2 - PPSV23) 07/19/2021    Lipid screen  07/10/2023    Colon cancer screen colonoscopy  02/10/2026    DTaP/Tdap/Td vaccine (2 - Td) 07/11/2028    DEXA (modify frequency per FRAX score)  Completed    Flu vaccine  Completed    Hepatitis C screen  Completed    HIV screen  Completed           Salima Perez is a 70 y.o. female who presents today for her medical conditions/complaintsas noted below.       Salima Perez is here today c/o Follow-up (verito External ear and ear canal normal. A middle ear effusion is present. Left Ear: Tympanic membrane, external ear and ear canal normal.   Nose: Nose normal. Right sinus exhibits no maxillary sinus tenderness and no frontal sinus tenderness. Left sinus exhibits no maxillary sinus tenderness and no frontal sinus tenderness. Mouth/Throat: Uvula is midline, oropharynx is clear and moist and mucous membranes are normal. No oropharyngeal exudate, posterior oropharyngeal edema, posterior oropharyngeal erythema or tonsillar abscesses. No tonsillar exudate. Eyes: Pupils are equal, round, and reactive to light. Conjunctivae are normal. Right eye exhibits no discharge. Left eye exhibits no discharge. No scleral icterus. Neck: Normal range of motion. Neck supple. No JVD present. No tracheal deviation present. Cardiovascular: Normal rate, regular rhythm, normal heart sounds and intact distal pulses. Exam reveals no gallop and no friction rub. No murmur heard. Pulmonary/Chest: Effort normal and breath sounds normal. No accessory muscle usage or stridor. No tachypnea. No respiratory distress. She has no decreased breath sounds. She has no wheezes. She has no rhonchi. She has no rales. Unremarkable    Abdominal: Soft. Musculoskeletal: Normal range of motion. She exhibits no edema, tenderness or deformity. Lymphadenopathy:     She has no cervical adenopathy. Neurological: She is alert and oriented to person, place, and time. She has normal strength. She displays no atrophy and no tremor. No sensory deficit. She displays no seizure activity. Gait normal. GCS eye subscore is 4. GCS verbal subscore is 5. GCS motor subscore is 6. Skin: Skin is warm, dry and intact. No rash noted. She is not diaphoretic. No erythema. No pallor. Psychiatric: She has a normal mood and affect.  Her speech is normal and behavior is normal. Judgment and thought content normal. Cognition and memory are normal.         Assessment: 1. Viral URI    2. Mild intermittent asthma with acute exacerbation    3. Urinary body odor        Plan:     1. Viral URI    Advised these symptoms are viral and pt can forgo filling the Amoxil     2. Mild intermittent asthma with acute exacerbation    - ipratropium-albuterol (DUONEB) nebulizer solution 1 ampule; Inhale 3 mLs into the lungs once  - DME Order for Nebulizer as OP  - albuterol (PROVENTIL) (2.5 MG/3ML) 0.083% nebulizer solution; Take 3 mLs by nebulization every 6 hours as needed for Wheezing or Shortness of Breath  Dispense: 120 vial; Refill: 1    Nebulizer script given  Advised to use Q4-6 hours PRN while sick  Could fill the Medrol to take for asthma sx     3. Urinary body odor    - POCT Urinalysis No Micro (Auto)    Points to no s/s infection     Discussed use, benefit, and side effects of prescribed medications. All patient questions answered. Pt voiced understanding. Reviewed health maintenance. Instructed to continue current medications, diet and exercise. Patient agreedwith treatment plan. Follow up as directed.      Electronically signed by KATHYA Odell CNP on 12/11/2018

## 2018-12-17 RX ORDER — ALBUTEROL SULFATE 90 UG/1
2 AEROSOL, METERED RESPIRATORY (INHALATION) EVERY 6 HOURS PRN
Qty: 3 INHALER | Refills: 3 | Status: SHIPPED | OUTPATIENT
Start: 2018-12-17 | End: 2020-01-20 | Stop reason: SDUPTHER

## 2018-12-17 RX ORDER — HYDROCHLOROTHIAZIDE 50 MG/1
50 TABLET ORAL DAILY
Qty: 90 TABLET | Refills: 1 | Status: SHIPPED | OUTPATIENT
Start: 2018-12-17 | End: 2019-01-04 | Stop reason: SDUPTHER

## 2018-12-17 RX ORDER — LISINOPRIL 5 MG/1
5 TABLET ORAL DAILY
Qty: 90 TABLET | Refills: 1 | Status: SHIPPED | OUTPATIENT
Start: 2018-12-17 | End: 2019-01-04 | Stop reason: SDUPTHER

## 2018-12-17 RX ORDER — POTASSIUM CHLORIDE 750 MG/1
10 CAPSULE, EXTENDED RELEASE ORAL 2 TIMES DAILY
Qty: 180 CAPSULE | Refills: 1 | Status: SHIPPED | OUTPATIENT
Start: 2018-12-17 | End: 2018-12-28 | Stop reason: SDUPTHER

## 2018-12-17 RX ORDER — OMEPRAZOLE 20 MG/1
20 CAPSULE, DELAYED RELEASE ORAL DAILY
Qty: 90 CAPSULE | Refills: 3 | Status: SHIPPED | OUTPATIENT
Start: 2018-12-17 | End: 2019-01-04 | Stop reason: SDUPTHER

## 2018-12-20 ENCOUNTER — OFFICE VISIT (OUTPATIENT)
Dept: FAMILY MEDICINE CLINIC | Age: 63
End: 2018-12-20
Payer: MEDICARE

## 2018-12-20 VITALS
DIASTOLIC BLOOD PRESSURE: 70 MMHG | SYSTOLIC BLOOD PRESSURE: 126 MMHG | BODY MASS INDEX: 39.38 KG/M2 | WEIGHT: 205 LBS | HEART RATE: 83 BPM

## 2018-12-20 DIAGNOSIS — J45.21 MILD INTERMITTENT ASTHMA WITH ACUTE EXACERBATION: ICD-10-CM

## 2018-12-20 DIAGNOSIS — E78.5 HYPERLIPIDEMIA, UNSPECIFIED HYPERLIPIDEMIA TYPE: Primary | Chronic | ICD-10-CM

## 2018-12-20 DIAGNOSIS — K21.9 GASTROESOPHAGEAL REFLUX DISEASE, ESOPHAGITIS PRESENCE NOT SPECIFIED: Chronic | ICD-10-CM

## 2018-12-20 DIAGNOSIS — I10 ESSENTIAL HYPERTENSION: ICD-10-CM

## 2018-12-20 PROCEDURE — 1101F PT FALLS ASSESS-DOCD LE1/YR: CPT | Performed by: FAMILY MEDICINE

## 2018-12-20 PROCEDURE — 4040F PNEUMOC VAC/ADMIN/RCVD: CPT | Performed by: FAMILY MEDICINE

## 2018-12-20 PROCEDURE — G8400 PT W/DXA NO RESULTS DOC: HCPCS | Performed by: FAMILY MEDICINE

## 2018-12-20 PROCEDURE — G8427 DOCREV CUR MEDS BY ELIG CLIN: HCPCS | Performed by: FAMILY MEDICINE

## 2018-12-20 PROCEDURE — 99213 OFFICE O/P EST LOW 20 MIN: CPT | Performed by: FAMILY MEDICINE

## 2018-12-20 PROCEDURE — 1090F PRES/ABSN URINE INCON ASSESS: CPT | Performed by: FAMILY MEDICINE

## 2018-12-20 PROCEDURE — 1036F TOBACCO NON-USER: CPT | Performed by: FAMILY MEDICINE

## 2018-12-20 PROCEDURE — 1123F ACP DISCUSS/DSCN MKR DOCD: CPT | Performed by: FAMILY MEDICINE

## 2018-12-20 PROCEDURE — 3017F COLORECTAL CA SCREEN DOC REV: CPT | Performed by: FAMILY MEDICINE

## 2018-12-20 PROCEDURE — G8482 FLU IMMUNIZE ORDER/ADMIN: HCPCS | Performed by: FAMILY MEDICINE

## 2018-12-20 PROCEDURE — G8417 CALC BMI ABV UP PARAM F/U: HCPCS | Performed by: FAMILY MEDICINE

## 2018-12-20 ASSESSMENT — ENCOUNTER SYMPTOMS
NAUSEA: 0
DIARRHEA: 0
BACK PAIN: 0
SHORTNESS OF BREATH: 0
COUGH: 0
VOMITING: 0
SINUS PRESSURE: 0
SORE THROAT: 0

## 2018-12-20 NOTE — PROGRESS NOTES
Past Medical History:   Diagnosis Date    Asthma     Hypertension     OA (osteoarthritis)     Obesity       Past Surgical History:   Procedure Laterality Date    COLONOSCOPY      2006; 2016; due 2026     No family history on file. Social History   Substance Use Topics    Smoking status: Never Smoker    Smokeless tobacco: Never Used    Alcohol use Yes      Comment: rare       Current Outpatient Prescriptions   Medication Sig Dispense Refill    potassium chloride (MICRO-K) 10 MEQ extended release capsule Take 1 capsule by mouth 2 times daily 180 capsule 1    omeprazole (PRILOSEC) 20 MG delayed release capsule Take 1 capsule by mouth Daily 90 capsule 3    albuterol sulfate HFA (PROVENTIL HFA) 108 (90 Base) MCG/ACT inhaler Inhale 2 puffs into the lungs every 6 hours as needed for Wheezing 3 Inhaler 3    lisinopril (PRINIVIL;ZESTRIL) 5 MG tablet Take 1 tablet by mouth daily 90 tablet 1    hydrochlorothiazide (HYDRODIURIL) 50 MG tablet Take 1 tablet by mouth daily 90 tablet 1    albuterol (PROVENTIL) (2.5 MG/3ML) 0.083% nebulizer solution Take 3 mLs by nebulization every 6 hours as needed for Wheezing or Shortness of Breath 120 vial 1    EPIPEN 2-AMRITA 0.3 MG/0.3ML SOAJ injection prn 1 each 2    Lactobacillus (PROBIOTIC ACIDOPHILUS PO) Take by mouth       No current facility-administered medications for this visit. Allergies   Allergen Reactions    Daypro [Oxaprozin]     Tetracyclines & Related     Erythromycin Rash         Subjective:   Review of Systems   Constitutional: Positive for fatigue. Negative for chills, diaphoresis and fever. HENT: Negative for congestion, sinus pressure and sore throat. Eyes: Negative for visual disturbance. Respiratory: Negative for cough and shortness of breath. Cardiovascular: Negative for chest pain and leg swelling. Gastrointestinal: Negative for diarrhea, nausea and vomiting.    Genitourinary: Negative for dysuria, menstrual problem, urgency and

## 2018-12-28 RX ORDER — POTASSIUM CHLORIDE 750 MG/1
10 CAPSULE, EXTENDED RELEASE ORAL 2 TIMES DAILY
Qty: 60 CAPSULE | Refills: 0 | Status: SHIPPED | OUTPATIENT
Start: 2018-12-28 | End: 2019-02-03 | Stop reason: SDUPTHER

## 2019-01-04 RX ORDER — LISINOPRIL 5 MG/1
5 TABLET ORAL DAILY
Qty: 90 TABLET | Refills: 3 | Status: SHIPPED | OUTPATIENT
Start: 2019-01-04 | End: 2019-07-09 | Stop reason: SDUPTHER

## 2019-01-04 RX ORDER — OMEPRAZOLE 20 MG/1
20 CAPSULE, DELAYED RELEASE ORAL DAILY
Qty: 90 CAPSULE | Refills: 3 | Status: SHIPPED | OUTPATIENT
Start: 2019-01-04 | End: 2020-05-04 | Stop reason: SDUPTHER

## 2019-01-04 RX ORDER — HYDROCHLOROTHIAZIDE 50 MG/1
50 TABLET ORAL DAILY
Qty: 90 TABLET | Refills: 3 | Status: SHIPPED | OUTPATIENT
Start: 2019-01-04 | End: 2019-07-09 | Stop reason: SDUPTHER

## 2019-01-16 LAB
ALBUMIN SERPL-MCNC: 4.2 G/DL
ALP BLD-CCNC: 115 U/L
ALT SERPL-CCNC: 18 U/L
ANION GAP SERPL CALCULATED.3IONS-SCNC: 8 MMOL/L
AST SERPL-CCNC: 19 U/L
BASOPHILS ABSOLUTE: 0 /ΜL
BASOPHILS RELATIVE PERCENT: 0.5 %
BILIRUB SERPL-MCNC: 0.8 MG/DL (ref 0.1–1.4)
BUN BLDV-MCNC: 14 MG/DL
CALCIUM SERPL-MCNC: 9.5 MG/DL
CHLORIDE BLD-SCNC: 102 MMOL/L
CHOLESTEROL, TOTAL: 197 MG/DL
CHOLESTEROL/HDL RATIO: 3.6
CO2: 29 MMOL/L
CREAT SERPL-MCNC: 0.66 MG/DL
EOSINOPHILS ABSOLUTE: 0.2 /ΜL
EOSINOPHILS RELATIVE PERCENT: 2.9 %
GFR CALCULATED: >60
GLUCOSE BLD-MCNC: 86 MG/DL
HCT VFR BLD CALC: 39.4 % (ref 36–46)
HDLC SERPL-MCNC: 55 MG/DL (ref 35–70)
HEMOGLOBIN: 13.5 G/DL (ref 12–16)
LDL CHOLESTEROL CALCULATED: 112 MG/DL (ref 0–160)
LYMPHOCYTES ABSOLUTE: 1.9 /ΜL
LYMPHOCYTES RELATIVE PERCENT: 31.2 %
MCH RBC QN AUTO: 28.8 PG
MCHC RBC AUTO-ENTMCNC: 34.1 G/DL
MCV RBC AUTO: 84 FL
MONOCYTES ABSOLUTE: 0.4 /ΜL
MONOCYTES RELATIVE PERCENT: 7.1 %
NEUTROPHILS ABSOLUTE: 3.6 /ΜL
NEUTROPHILS RELATIVE PERCENT: 58.3 %
PDW BLD-RTO: 13.5 %
PLATELET # BLD: 243 K/ΜL
PMV BLD AUTO: 9.2 FL
POTASSIUM SERPL-SCNC: 4 MMOL/L
RBC # BLD: 4.68 10^6/ΜL
SODIUM BLD-SCNC: 139 MMOL/L
TOTAL PROTEIN: 7.2
TRIGL SERPL-MCNC: 148 MG/DL
VLDLC SERPL CALC-MCNC: 30 MG/DL
WBC # BLD: 6.1 10^3/ML

## 2019-01-18 ENCOUNTER — OFFICE VISIT (OUTPATIENT)
Dept: FAMILY MEDICINE CLINIC | Age: 64
End: 2019-01-18
Payer: MEDICARE

## 2019-01-18 VITALS
HEART RATE: 87 BPM | SYSTOLIC BLOOD PRESSURE: 126 MMHG | OXYGEN SATURATION: 95 % | DIASTOLIC BLOOD PRESSURE: 78 MMHG | BODY MASS INDEX: 40.84 KG/M2 | WEIGHT: 208 LBS | HEIGHT: 60 IN

## 2019-01-18 DIAGNOSIS — E78.5 HYPERLIPIDEMIA, UNSPECIFIED HYPERLIPIDEMIA TYPE: Primary | Chronic | ICD-10-CM

## 2019-01-18 DIAGNOSIS — K21.9 GASTROESOPHAGEAL REFLUX DISEASE, ESOPHAGITIS PRESENCE NOT SPECIFIED: Chronic | ICD-10-CM

## 2019-01-18 DIAGNOSIS — Z12.39 BREAST CANCER SCREENING: Primary | ICD-10-CM

## 2019-01-18 DIAGNOSIS — E78.5 HYPERLIPIDEMIA, UNSPECIFIED HYPERLIPIDEMIA TYPE: Chronic | ICD-10-CM

## 2019-01-18 DIAGNOSIS — Z12.39 BREAST CANCER SCREENING: ICD-10-CM

## 2019-01-18 DIAGNOSIS — I10 ESSENTIAL HYPERTENSION: ICD-10-CM

## 2019-01-18 PROCEDURE — G8400 PT W/DXA NO RESULTS DOC: HCPCS | Performed by: FAMILY MEDICINE

## 2019-01-18 PROCEDURE — 1123F ACP DISCUSS/DSCN MKR DOCD: CPT | Performed by: FAMILY MEDICINE

## 2019-01-18 PROCEDURE — G8417 CALC BMI ABV UP PARAM F/U: HCPCS | Performed by: FAMILY MEDICINE

## 2019-01-18 PROCEDURE — 3017F COLORECTAL CA SCREEN DOC REV: CPT | Performed by: FAMILY MEDICINE

## 2019-01-18 PROCEDURE — 99213 OFFICE O/P EST LOW 20 MIN: CPT | Performed by: FAMILY MEDICINE

## 2019-01-18 PROCEDURE — G8482 FLU IMMUNIZE ORDER/ADMIN: HCPCS | Performed by: FAMILY MEDICINE

## 2019-01-18 PROCEDURE — 1090F PRES/ABSN URINE INCON ASSESS: CPT | Performed by: FAMILY MEDICINE

## 2019-01-18 PROCEDURE — 1101F PT FALLS ASSESS-DOCD LE1/YR: CPT | Performed by: FAMILY MEDICINE

## 2019-01-18 PROCEDURE — 1036F TOBACCO NON-USER: CPT | Performed by: FAMILY MEDICINE

## 2019-01-18 PROCEDURE — G8427 DOCREV CUR MEDS BY ELIG CLIN: HCPCS | Performed by: FAMILY MEDICINE

## 2019-01-18 PROCEDURE — 4040F PNEUMOC VAC/ADMIN/RCVD: CPT | Performed by: FAMILY MEDICINE

## 2019-01-18 ASSESSMENT — ENCOUNTER SYMPTOMS
SORE THROAT: 0
DIARRHEA: 0
COUGH: 0
SHORTNESS OF BREATH: 0
NAUSEA: 0
VOMITING: 0
BACK PAIN: 0
SINUS PRESSURE: 0

## 2019-02-04 RX ORDER — POTASSIUM CHLORIDE 750 MG/1
CAPSULE, EXTENDED RELEASE ORAL
Qty: 60 CAPSULE | Refills: 11 | Status: SHIPPED | OUTPATIENT
Start: 2019-02-04 | End: 2019-05-06 | Stop reason: SDUPTHER

## 2019-02-22 ENCOUNTER — OFFICE VISIT (OUTPATIENT)
Dept: FAMILY MEDICINE CLINIC | Age: 64
End: 2019-02-22
Payer: MEDICARE

## 2019-02-22 VITALS
TEMPERATURE: 99.1 F | WEIGHT: 210 LBS | HEART RATE: 84 BPM | SYSTOLIC BLOOD PRESSURE: 122 MMHG | DIASTOLIC BLOOD PRESSURE: 72 MMHG | RESPIRATION RATE: 18 BRPM | OXYGEN SATURATION: 97 % | BODY MASS INDEX: 41.01 KG/M2

## 2019-02-22 DIAGNOSIS — J10.1 INFLUENZA A: Primary | ICD-10-CM

## 2019-02-22 DIAGNOSIS — J98.01 BRONCHOSPASM: ICD-10-CM

## 2019-02-22 LAB
INFLUENZA A ANTIBODY: POSITIVE
INFLUENZA B ANTIBODY: NEGATIVE

## 2019-02-22 PROCEDURE — G8427 DOCREV CUR MEDS BY ELIG CLIN: HCPCS | Performed by: NURSE PRACTITIONER

## 2019-02-22 PROCEDURE — 87804 INFLUENZA ASSAY W/OPTIC: CPT | Performed by: NURSE PRACTITIONER

## 2019-02-22 PROCEDURE — 3017F COLORECTAL CA SCREEN DOC REV: CPT | Performed by: NURSE PRACTITIONER

## 2019-02-22 PROCEDURE — 1101F PT FALLS ASSESS-DOCD LE1/YR: CPT | Performed by: NURSE PRACTITIONER

## 2019-02-22 PROCEDURE — G8400 PT W/DXA NO RESULTS DOC: HCPCS | Performed by: NURSE PRACTITIONER

## 2019-02-22 PROCEDURE — G8482 FLU IMMUNIZE ORDER/ADMIN: HCPCS | Performed by: NURSE PRACTITIONER

## 2019-02-22 PROCEDURE — G8417 CALC BMI ABV UP PARAM F/U: HCPCS | Performed by: NURSE PRACTITIONER

## 2019-02-22 PROCEDURE — 4040F PNEUMOC VAC/ADMIN/RCVD: CPT | Performed by: NURSE PRACTITIONER

## 2019-02-22 PROCEDURE — 1123F ACP DISCUSS/DSCN MKR DOCD: CPT | Performed by: NURSE PRACTITIONER

## 2019-02-22 PROCEDURE — 1090F PRES/ABSN URINE INCON ASSESS: CPT | Performed by: NURSE PRACTITIONER

## 2019-02-22 PROCEDURE — 99214 OFFICE O/P EST MOD 30 MIN: CPT | Performed by: NURSE PRACTITIONER

## 2019-02-22 PROCEDURE — 1036F TOBACCO NON-USER: CPT | Performed by: NURSE PRACTITIONER

## 2019-02-22 RX ORDER — METHYLPREDNISOLONE 4 MG/1
TABLET ORAL
Qty: 1 KIT | Refills: 0 | Status: SHIPPED | OUTPATIENT
Start: 2019-02-22 | End: 2019-02-28

## 2019-02-22 RX ORDER — BENZONATATE 200 MG/1
200 CAPSULE ORAL 3 TIMES DAILY PRN
Qty: 30 CAPSULE | Refills: 0 | Status: SHIPPED | OUTPATIENT
Start: 2019-02-22 | End: 2019-07-18 | Stop reason: ALTCHOICE

## 2019-02-22 ASSESSMENT — ENCOUNTER SYMPTOMS
VOMITING: 0
CHOKING: 0
STRIDOR: 0
SINUS PAIN: 0
FACIAL SWELLING: 0
CHEST TIGHTNESS: 1
SORE THROAT: 1
RHINORRHEA: 1
COUGH: 1
SHORTNESS OF BREATH: 1
TROUBLE SWALLOWING: 0
DIARRHEA: 0
NAUSEA: 0
WHEEZING: 1
SINUS PRESSURE: 1

## 2019-05-06 RX ORDER — POTASSIUM CHLORIDE 750 MG/1
CAPSULE, EXTENDED RELEASE ORAL
Qty: 60 CAPSULE | Refills: 11 | Status: SHIPPED | OUTPATIENT
Start: 2019-05-06 | End: 2020-02-26

## 2019-05-06 NOTE — TELEPHONE ENCOUNTER
Next Visit Date:  Future Appointments   Date Time Provider Gloria Gosia   7/18/2019  9:30 AM Anuel Powell  [address] [facility] Maintenance   Topic Date Due    Shingles Vaccine (1 of 2) 03/25/2003    Potassium monitoring  01/16/2020    Creatinine monitoring  01/16/2020    Breast cancer screen  07/10/2020    Pneumococcal 65+ years Vaccine (2 of 2 - PPSV23) 07/19/2021    Lipid screen  01/16/2024    Colon cancer screen colonoscopy  02/10/2026    DTaP/Tdap/Td vaccine (2 - Td) 07/11/2028    DEXA (modify frequency per FRAX score)  Completed    Flu vaccine  Completed    Hepatitis C screen  Completed       No results found for: LABA1C          ( goal A1C is < 7)   No results found for: LABMICR  LDL Calculated (mg/dL)   Date Value   01/16/2019 112   07/10/2018 101       (goal LDL is <100)   AST (U/L)   Date Value   01/16/2019 19     ALT (U/L)   Date Value   01/16/2019 18     BUN (mg/dL)   Date Value   01/16/2019 14     BP Readings from Last 3 Encounters:   02/22/19 122/72   01/18/19 126/78   12/20/18 126/70          (goal 120/80)    All Future Testing planned in [facility]  Lab Frequency Next Occurrence   CBC Auto Differential Once 07/18/2019   Comprehensive Metabolic Panel Once 58/75/9807   Lipid Panel Once 07/18/2019               Patient Active Problem List:     Asthma     Obesity     OA (osteoarthritis)     Hyperlipidemia     BPPV (benign paroxysmal positional vertigo)     GERD (gastroesophageal reflux disease)     Essential hypertension     Anaphylaxis

## 2019-07-09 RX ORDER — HYDROCHLOROTHIAZIDE 50 MG/1
50 TABLET ORAL DAILY
Qty: 90 TABLET | Refills: 3 | Status: SHIPPED | OUTPATIENT
Start: 2019-07-09 | End: 2020-07-14 | Stop reason: SDUPTHER

## 2019-07-09 RX ORDER — LISINOPRIL 5 MG/1
5 TABLET ORAL DAILY
Qty: 90 TABLET | Refills: 3 | Status: SHIPPED | OUTPATIENT
Start: 2019-07-09 | End: 2020-07-14 | Stop reason: SDUPTHER

## 2019-07-12 LAB
ALBUMIN SERPL-MCNC: 3.9 G/DL
ALP BLD-CCNC: 103 U/L
ALT SERPL-CCNC: 25 U/L
ANION GAP SERPL CALCULATED.3IONS-SCNC: 10 MMOL/L
AST SERPL-CCNC: 23 U/L
BASOPHILS ABSOLUTE: 0 /ΜL
BASOPHILS RELATIVE PERCENT: 1 %
BILIRUB SERPL-MCNC: 0.9 MG/DL (ref 0.1–1.4)
BUN BLDV-MCNC: 19 MG/DL
CALCIUM SERPL-MCNC: 9.3 MG/DL
CHLORIDE BLD-SCNC: 106 MMOL/L
CHOLESTEROL, TOTAL: 169 MG/DL
CHOLESTEROL/HDL RATIO: 3.1
CO2: 26 MMOL/L
CREAT SERPL-MCNC: 0.65 MG/DL
EOSINOPHILS ABSOLUTE: 0.3 /ΜL
EOSINOPHILS RELATIVE PERCENT: 6 %
GFR CALCULATED: >60
GLUCOSE BLD-MCNC: 94 MG/DL
HCT VFR BLD CALC: 37.4 % (ref 36–46)
HDLC SERPL-MCNC: 54 MG/DL (ref 35–70)
HEMOGLOBIN: 12.6 G/DL (ref 12–16)
LDL CHOLESTEROL CALCULATED: 90 MG/DL (ref 0–160)
LYMPHOCYTES ABSOLUTE: 1.6 /ΜL
LYMPHOCYTES RELATIVE PERCENT: 31.6 %
MCH RBC QN AUTO: 28.2 PG
MCHC RBC AUTO-ENTMCNC: 33.8 G/DL
MCV RBC AUTO: 83 FL
MONOCYTES ABSOLUTE: 0.4 /ΜL
MONOCYTES RELATIVE PERCENT: 8 %
NEUTROPHILS ABSOLUTE: 2.7 /ΜL
NEUTROPHILS RELATIVE PERCENT: 53.4 %
PDW BLD-RTO: 13.4 %
PLATELET # BLD: 219 K/ΜL
PMV BLD AUTO: 9.4 FL
POTASSIUM SERPL-SCNC: 4 MMOL/L
RBC # BLD: 4.48 10^6/ΜL
SODIUM BLD-SCNC: 142 MMOL/L
TOTAL PROTEIN: 7
TRIGL SERPL-MCNC: 125 MG/DL
VLDLC SERPL CALC-MCNC: 25 MG/DL
WBC # BLD: 5.1 10^3/ML

## 2019-07-18 ENCOUNTER — OFFICE VISIT (OUTPATIENT)
Dept: FAMILY MEDICINE CLINIC | Age: 64
End: 2019-07-18
Payer: MEDICARE

## 2019-07-18 VITALS
DIASTOLIC BLOOD PRESSURE: 70 MMHG | WEIGHT: 211.6 LBS | OXYGEN SATURATION: 96 % | SYSTOLIC BLOOD PRESSURE: 126 MMHG | HEART RATE: 83 BPM | BODY MASS INDEX: 41.33 KG/M2

## 2019-07-18 DIAGNOSIS — M19.90 OSTEOARTHRITIS, UNSPECIFIED OSTEOARTHRITIS TYPE, UNSPECIFIED SITE: ICD-10-CM

## 2019-07-18 DIAGNOSIS — E66.01 MORBID OBESITY WITH BMI OF 40.0-44.9, ADULT (HCC): ICD-10-CM

## 2019-07-18 DIAGNOSIS — H40.89 OTHER GLAUCOMA OF BOTH EYES: ICD-10-CM

## 2019-07-18 DIAGNOSIS — E78.5 HYPERLIPIDEMIA, UNSPECIFIED HYPERLIPIDEMIA TYPE: Primary | Chronic | ICD-10-CM

## 2019-07-18 DIAGNOSIS — I10 ESSENTIAL HYPERTENSION: ICD-10-CM

## 2019-07-18 PROCEDURE — 3017F COLORECTAL CA SCREEN DOC REV: CPT | Performed by: FAMILY MEDICINE

## 2019-07-18 PROCEDURE — G8427 DOCREV CUR MEDS BY ELIG CLIN: HCPCS | Performed by: FAMILY MEDICINE

## 2019-07-18 PROCEDURE — G8400 PT W/DXA NO RESULTS DOC: HCPCS | Performed by: FAMILY MEDICINE

## 2019-07-18 PROCEDURE — 1036F TOBACCO NON-USER: CPT | Performed by: FAMILY MEDICINE

## 2019-07-18 PROCEDURE — 1090F PRES/ABSN URINE INCON ASSESS: CPT | Performed by: FAMILY MEDICINE

## 2019-07-18 PROCEDURE — 4040F PNEUMOC VAC/ADMIN/RCVD: CPT | Performed by: FAMILY MEDICINE

## 2019-07-18 PROCEDURE — G8417 CALC BMI ABV UP PARAM F/U: HCPCS | Performed by: FAMILY MEDICINE

## 2019-07-18 PROCEDURE — 1123F ACP DISCUSS/DSCN MKR DOCD: CPT | Performed by: FAMILY MEDICINE

## 2019-07-18 PROCEDURE — 99213 OFFICE O/P EST LOW 20 MIN: CPT | Performed by: FAMILY MEDICINE

## 2019-07-18 RX ORDER — UBIDECARENONE 50 MG
TABLET ORAL
COMMUNITY
End: 2020-02-24

## 2019-07-18 RX ORDER — INDOMETHACIN 25 MG
CAPSULE ORAL
COMMUNITY
End: 2020-02-24

## 2019-07-18 ASSESSMENT — ENCOUNTER SYMPTOMS
VOMITING: 0
DIARRHEA: 0
SHORTNESS OF BREATH: 0
SINUS PRESSURE: 0
COUGH: 0
NAUSEA: 0
BACK PAIN: 1
SORE THROAT: 0

## 2019-07-18 ASSESSMENT — PATIENT HEALTH QUESTIONNAIRE - PHQ9
SUM OF ALL RESPONSES TO PHQ9 QUESTIONS 1 & 2: 0
SUM OF ALL RESPONSES TO PHQ QUESTIONS 1-9: 0
1. LITTLE INTEREST OR PLEASURE IN DOING THINGS: 0
SUM OF ALL RESPONSES TO PHQ QUESTIONS 1-9: 0
2. FEELING DOWN, DEPRESSED OR HOPELESS: 0

## 2019-07-18 NOTE — PROGRESS NOTES
Baljinder Bridges is a 75 y.o. female who presents todayfor her medical conditions/complaints as noted below. Baljinder Bridges is here today c/o6 Month Follow-Up (hyperlipidemia)  Going to [facility] in August.  Preparing for this trip. :     Visit Information    Have you changed or started any medications since your last visit including any over-the-counter medicines, vitamins, or herbal medicines? no   Are you having any side effects from any of your medications? -  no  Have you stopped taking any of your medications? Is so, why? -  no    Have you seen any other physician or provider since your last visit? No  Have you had any other diagnostic tests since your last visit? No  Have you been seen in the emergency room and/or had an admission to a hospital since we last saw you? No  Have you had your routine dental cleaning in the past 6 months? no    Have you activated your [facility] account? If not, what are your barriers?  Yes     Patient Care Team:  Sergei Chavez MD as PCP - General (Family Medicine)  Sergei Chavez MD as PCP [facility]  Nini Meehan (Ophthalmology)  Quince Babinski, MD as Consulting Physician (Rheumatology)  Ladonna Vieira MD as Consulting Physician (Gastroenterology)    Medical History Review  Past Medical, Family, and Social History reviewed and does not contribute to the patient presenting condition    Health Maintenance   Topic Date Due    Shingles Vaccine (1 of 2) 03/25/2003    Annual Wellness Visit (AWV)  03/25/2016    Flu vaccine (1) 09/01/2019    Potassium monitoring  01/16/2020    Creatinine monitoring  01/16/2020    Breast cancer screen  07/10/2020    Pneumococcal 65+ years Vaccine (2 of 2 - PPSV23) 07/19/2021    Lipid screen  01/16/2024    Colon cancer screen colonoscopy  02/10/2026    DTaP/Tdap/Td vaccine (2 - Td) 07/11/2028    DEXA (modify frequency per FRAX score)  Completed    Hepatitis C screen  Completed           HPI        Past Medical History:   Diagnosis Date    Asthma     Hypertension     OA (osteoarthritis)     Obesity       Past Surgical History:   Procedure Laterality Date    COLONOSCOPY      2006; 2016; due 2026     No family history on file. Social History     Tobacco Use    Smoking status: Never Smoker    Smokeless tobacco: Never Used   Substance Use Topics    Alcohol use: Yes     Comment: rare       Current Outpatient Medications   Medication Sig Dispense Refill    Calcium-Magnesium (JOSSY/MAG) 200-100 MG TABS Take by mouth      Misc Natural Products (LEG VEIN & CIRCULATION) TABS Take by mouth      hydrochlorothiazide (HYDRODIURIL) 50 MG tablet Take 1 tablet by mouth daily 90 tablet 3    lisinopril (PRINIVIL;ZESTRIL) 5 MG tablet Take 1 tablet by mouth daily 90 tablet 3    potassium chloride (MICRO-K) 10 MEQ extended release capsule take 1 capsule by mouth twice a day 60 capsule 11    omeprazole (PRILOSEC) 20 MG delayed release capsule Take 1 capsule by mouth Daily 90 capsule 3    albuterol sulfate HFA (PROVENTIL HFA) 108 (90 Base) MCG/ACT inhaler Inhale 2 puffs into the lungs every 6 hours as needed for Wheezing 3 Inhaler 3    albuterol (PROVENTIL) (2.5 MG/3ML) 0.083% nebulizer solution Take 3 mLs by nebulization every 6 hours as needed for Wheezing or Shortness of Breath 120 vial 1    EPIPEN 2-AMRITA 0.3 MG/0.3ML SOAJ injection prn 1 each 2    Lactobacillus (PROBIOTIC ACIDOPHILUS PO) Take by mouth       No current facility-administered medications for this visit. Allergies   Allergen Reactions    Daypro [Oxaprozin]     Tetracyclines & Related     Erythromycin Rash         Subjective:   Review of Systems   Constitutional: Negative for chills, diaphoresis and fever. HENT: Positive for sneezing. Negative for congestion, sinus pressure and sore throat. Eyes: Negative for visual disturbance. Respiratory: Negative for cough and shortness of breath. Cardiovascular: Negative for chest pain and leg swelling. Gastrointestinal: Negative for diarrhea, nausea and vomiting. Genitourinary: Negative for dysuria, menstrual problem, urgency and vaginal discharge. Musculoskeletal: Positive for arthralgias, back pain and gait problem. Negative for myalgias. Skin: Negative for rash. Neurological: Negative for weakness, numbness and headaches. Psychiatric/Behavioral: Negative for sleep disturbance.       :   /70   Pulse 83   Wt 211 lb 9.6 oz (96 kg)   SpO2 96%   BMI 41.33 kg/m²     Physical Exam   Constitutional: She is oriented to person, place, and time. She appears well-developed and well-nourished. No distress. HENT:   Head: Normocephalic and atraumatic. Mouth/Throat: No oropharyngeal exudate. Eyes: No scleral icterus. Neck: Neck supple. Carotid bruit is not present. Cardiovascular: Exam reveals no gallop and no friction rub. No murmur heard. Pulmonary/Chest: No respiratory distress. She has no wheezes. She has no rales. She exhibits no tenderness. Musculoskeletal: She exhibits no edema. Lymphadenopathy:     She has no cervical adenopathy. Neurological: She is alert and oriented to person, place, and time. No cranial nerve deficit. Skin: No rash noted. She is not diaphoretic. Psychiatric: She has a normal mood and affect. Her behavior is normal. Judgment and thought content normal.       Assessment:       Diagnosis Orders   1. Hyperlipidemia, unspecified hyperlipidemia type  Comprehensive Metabolic Panel    Lipid Panel   2. Other glaucoma of both eyes     3. Essential hypertension  CBC Auto Differential    Comprehensive Metabolic Panel    Lipid Panel   4. Morbid obesity with BMI of 40.0-44.9, adult ([address]     5. Osteoarthritis, unspecified osteoarthritis type, unspecified site           Plan:      Return in about 6 months (around 1/18/2020).     Orders Placed This Encounter   Procedures    CBC Auto Differential     Standing Status:   Future     Standing Expiration Date:   7/18/2020   Moody Prior

## 2019-07-30 DIAGNOSIS — E78.5 HYPERLIPIDEMIA, UNSPECIFIED HYPERLIPIDEMIA TYPE: Chronic | ICD-10-CM

## 2019-07-30 DIAGNOSIS — I10 ESSENTIAL HYPERTENSION: ICD-10-CM

## 2019-08-13 ENCOUNTER — NURSE TRIAGE (OUTPATIENT)
Dept: OTHER | Age: 64
End: 2019-08-13

## 2019-08-30 ENCOUNTER — OFFICE VISIT (OUTPATIENT)
Dept: FAMILY MEDICINE CLINIC | Age: 64
End: 2019-08-30
Payer: MEDICARE

## 2019-08-30 VITALS
BODY MASS INDEX: 41.01 KG/M2 | HEART RATE: 93 BPM | DIASTOLIC BLOOD PRESSURE: 80 MMHG | WEIGHT: 210 LBS | SYSTOLIC BLOOD PRESSURE: 138 MMHG | OXYGEN SATURATION: 98 %

## 2019-08-30 DIAGNOSIS — S90.229A CONTUSION OF TOENAIL, UNSPECIFIED LATERALITY, INITIAL ENCOUNTER: Primary | ICD-10-CM

## 2019-08-30 PROCEDURE — G8427 DOCREV CUR MEDS BY ELIG CLIN: HCPCS | Performed by: FAMILY MEDICINE

## 2019-08-30 PROCEDURE — 4040F PNEUMOC VAC/ADMIN/RCVD: CPT | Performed by: FAMILY MEDICINE

## 2019-08-30 PROCEDURE — 1090F PRES/ABSN URINE INCON ASSESS: CPT | Performed by: FAMILY MEDICINE

## 2019-08-30 PROCEDURE — G8400 PT W/DXA NO RESULTS DOC: HCPCS | Performed by: FAMILY MEDICINE

## 2019-08-30 PROCEDURE — 1036F TOBACCO NON-USER: CPT | Performed by: FAMILY MEDICINE

## 2019-08-30 PROCEDURE — 99213 OFFICE O/P EST LOW 20 MIN: CPT | Performed by: FAMILY MEDICINE

## 2019-08-30 PROCEDURE — 3017F COLORECTAL CA SCREEN DOC REV: CPT | Performed by: FAMILY MEDICINE

## 2019-08-30 PROCEDURE — G8417 CALC BMI ABV UP PARAM F/U: HCPCS | Performed by: FAMILY MEDICINE

## 2019-08-30 PROCEDURE — 1123F ACP DISCUSS/DSCN MKR DOCD: CPT | Performed by: FAMILY MEDICINE

## 2019-12-12 ENCOUNTER — IMMUNIZATION (OUTPATIENT)
Dept: FAMILY MEDICINE CLINIC | Age: 64
End: 2019-12-12
Payer: MEDICARE

## 2019-12-12 DIAGNOSIS — Z23 IMMUNIZATION DUE: Primary | ICD-10-CM

## 2019-12-12 PROCEDURE — G0008 ADMIN INFLUENZA VIRUS VAC: HCPCS | Performed by: FAMILY MEDICINE

## 2019-12-12 PROCEDURE — 90653 IIV ADJUVANT VACCINE IM: CPT | Performed by: FAMILY MEDICINE

## 2020-01-20 RX ORDER — ALBUTEROL SULFATE 90 UG/1
2 AEROSOL, METERED RESPIRATORY (INHALATION) EVERY 6 HOURS PRN
Qty: 3 INHALER | Refills: 0 | Status: SHIPPED | OUTPATIENT
Start: 2020-01-20 | End: 2022-01-18 | Stop reason: SDUPTHER

## 2020-01-22 LAB
ALBUMIN SERPL-MCNC: 4.2 G/DL
ALP BLD-CCNC: 120 U/L
ALT SERPL-CCNC: 26 U/L
ANION GAP SERPL CALCULATED.3IONS-SCNC: 10 MMOL/L
AST SERPL-CCNC: 22 U/L
BASOPHILS ABSOLUTE: 0.1 /ΜL
BASOPHILS RELATIVE PERCENT: 0.9 %
BILIRUB SERPL-MCNC: 1 MG/DL (ref 0.1–1.4)
BUN BLDV-MCNC: 16 MG/DL
CALCIUM SERPL-MCNC: 9.6 MG/DL
CHLORIDE BLD-SCNC: 102 MMOL/L
CHOLESTEROL, TOTAL: 200 MG/DL
CHOLESTEROL/HDL RATIO: 3.6
CO2: 27 MMOL/L
CREAT SERPL-MCNC: 0.72 MG/DL
EOSINOPHILS ABSOLUTE: 0.2 /ΜL
EOSINOPHILS RELATIVE PERCENT: 3.6 %
GFR CALCULATED: >60
GLUCOSE BLD-MCNC: 87 MG/DL
HCT VFR BLD CALC: 40.3 % (ref 36–46)
HDLC SERPL-MCNC: 56 MG/DL (ref 35–70)
HEMOGLOBIN: 13.5 G/DL (ref 12–16)
LDL CHOLESTEROL CALCULATED: 113 MG/DL (ref 0–160)
LYMPHOCYTES ABSOLUTE: 2.1 /ΜL
LYMPHOCYTES RELATIVE PERCENT: 31.9 %
MCH RBC QN AUTO: 27.7 PG
MCHC RBC AUTO-ENTMCNC: 33.5 G/DL
MCV RBC AUTO: 83 FL
MONOCYTES ABSOLUTE: 0.5 /ΜL
MONOCYTES RELATIVE PERCENT: 7.2 %
NEUTROPHILS ABSOLUTE: 3.7 /ΜL
NEUTROPHILS RELATIVE PERCENT: 56.4 %
PDW BLD-RTO: 13.7 %
PLATELET # BLD: 251 K/ΜL
PMV BLD AUTO: 9.2 FL
POTASSIUM SERPL-SCNC: 3.9 MMOL/L
RBC # BLD: 4.88 10^6/ΜL
SODIUM BLD-SCNC: 139 MMOL/L
TOTAL PROTEIN: 7.4
TRIGL SERPL-MCNC: 156 MG/DL
VLDLC SERPL CALC-MCNC: 31 MG/DL
WBC # BLD: 6.5 10^3/ML

## 2020-01-27 ENCOUNTER — OFFICE VISIT (OUTPATIENT)
Dept: FAMILY MEDICINE CLINIC | Age: 65
End: 2020-01-27
Payer: MEDICARE

## 2020-01-27 VITALS
BODY MASS INDEX: 42.97 KG/M2 | OXYGEN SATURATION: 98 % | WEIGHT: 220 LBS | SYSTOLIC BLOOD PRESSURE: 122 MMHG | HEART RATE: 86 BPM | DIASTOLIC BLOOD PRESSURE: 70 MMHG

## 2020-01-27 PROCEDURE — 4040F PNEUMOC VAC/ADMIN/RCVD: CPT | Performed by: FAMILY MEDICINE

## 2020-01-27 PROCEDURE — G8400 PT W/DXA NO RESULTS DOC: HCPCS | Performed by: FAMILY MEDICINE

## 2020-01-27 PROCEDURE — 1123F ACP DISCUSS/DSCN MKR DOCD: CPT | Performed by: FAMILY MEDICINE

## 2020-01-27 PROCEDURE — 1036F TOBACCO NON-USER: CPT | Performed by: FAMILY MEDICINE

## 2020-01-27 PROCEDURE — 1090F PRES/ABSN URINE INCON ASSESS: CPT | Performed by: FAMILY MEDICINE

## 2020-01-27 PROCEDURE — 99213 OFFICE O/P EST LOW 20 MIN: CPT | Performed by: FAMILY MEDICINE

## 2020-01-27 PROCEDURE — G8482 FLU IMMUNIZE ORDER/ADMIN: HCPCS | Performed by: FAMILY MEDICINE

## 2020-01-27 PROCEDURE — G8427 DOCREV CUR MEDS BY ELIG CLIN: HCPCS | Performed by: FAMILY MEDICINE

## 2020-01-27 PROCEDURE — G8417 CALC BMI ABV UP PARAM F/U: HCPCS | Performed by: FAMILY MEDICINE

## 2020-01-27 PROCEDURE — 3017F COLORECTAL CA SCREEN DOC REV: CPT | Performed by: FAMILY MEDICINE

## 2020-01-27 ASSESSMENT — ENCOUNTER SYMPTOMS
SHORTNESS OF BREATH: 0
SINUS PRESSURE: 0
COUGH: 0
BACK PAIN: 0
SORE THROAT: 0
NAUSEA: 0
VOMITING: 0
DIARRHEA: 0

## 2020-01-27 NOTE — PROGRESS NOTES
Shanice Gudino is a 75 y.o. female who presents todayfor her medical conditions/complaints as noted below. Shanice Gudino is here today c/oHypertension and Discuss Labs      :     HPI    Routine follow up on PL her weight has gone up since her last visit. Past Medical History:   Diagnosis Date    Asthma     Hypertension     OA (osteoarthritis)     Obesity       Past Surgical History:   Procedure Laterality Date    COLONOSCOPY      2006; 2016; due 2026     No family history on file. Social History     Tobacco Use    Smoking status: Never Smoker    Smokeless tobacco: Never Used   Substance Use Topics    Alcohol use: Yes     Comment: rare       Current Outpatient Medications   Medication Sig Dispense Refill    albuterol sulfate HFA (PROVENTIL HFA) 108 (90 Base) MCG/ACT inhaler Inhale 2 puffs into the lungs every 6 hours as needed for Wheezing 3 Inhaler 0    Calcium-Magnesium (JOSSY/MAG) 200-100 MG TABS Take by mouth      Misc Natural Products (LEG VEIN & CIRCULATION) TABS Take by mouth      hydrochlorothiazide (HYDRODIURIL) 50 MG tablet Take 1 tablet by mouth daily 90 tablet 3    lisinopril (PRINIVIL;ZESTRIL) 5 MG tablet Take 1 tablet by mouth daily 90 tablet 3    potassium chloride (MICRO-K) 10 MEQ extended release capsule take 1 capsule by mouth twice a day 60 capsule 11    omeprazole (PRILOSEC) 20 MG delayed release capsule Take 1 capsule by mouth Daily 90 capsule 3    albuterol (PROVENTIL) (2.5 MG/3ML) 0.083% nebulizer solution Take 3 mLs by nebulization every 6 hours as needed for Wheezing or Shortness of Breath 120 vial 1    EPIPEN 2-AMRITA 0.3 MG/0.3ML SOAJ injection prn 1 each 2    Lactobacillus (PROBIOTIC ACIDOPHILUS PO) Take by mouth       No current facility-administered medications for this visit.       Allergies   Allergen Reactions    Daypro [Oxaprozin]     Tetracyclines & Related     Erythromycin Rash         Subjective:   Review of Systems   Constitutional: Negative for chills, 1. Hyperlipidemia, unspecified hyperlipidemia type  Comprehensive Metabolic Panel    Lipid Panel   2. Essential hypertension  CBC Auto Differential    Comprehensive Metabolic Panel    Lipid Panel         Plan:      No follow-ups on file. Orders Placed This Encounter   Procedures    CBC Auto Differential     Standing Status:   Future     Standing Expiration Date:   1/27/2021    Comprehensive Metabolic Panel     Standing Status:   Future     Standing Expiration Date:   1/27/2021    Lipid Panel     Standing Status:   Future     Standing Expiration Date:   1/27/2021     Order Specific Question:   Is Patient Fasting?/# of Hours     Answer:   12 hour fast     No orders of the defined types were placed in this encounter. Weight loss options discussed. Labs in six months prior to next follow up appointment.

## 2020-02-20 ENCOUNTER — NURSE TRIAGE (OUTPATIENT)
Dept: OTHER | Facility: CLINIC | Age: 65
End: 2020-02-20

## 2020-02-20 NOTE — TELEPHONE ENCOUNTER
Reason for Disposition   Wheezing is present    Protocols used: COUGH-ADULT-[place]    Patient called [facility]) to schedule appointment, with red flag complaint, transferred to RN access for triage. Reports having cough with history of asthma. States symptoms began three days ago. Reports cough is mostly a dry cough. States cough was productive this morning with yellow sputum noted. Patient informed of disposition. Care advice as documented. Instructed patient to call back with worsening symptoms. Soft transfer to pre-service center to schedule appointment as recommended. Please do not respond to the triage nurse through this encounter. Any subsequent communication should be directly with the patient.

## 2020-02-21 ENCOUNTER — OFFICE VISIT (OUTPATIENT)
Dept: FAMILY MEDICINE CLINIC | Age: 65
End: 2020-02-21
Payer: MEDICARE

## 2020-02-21 ENCOUNTER — TELEPHONE (OUTPATIENT)
Dept: FAMILY MEDICINE CLINIC | Age: 65
End: 2020-02-21

## 2020-02-21 VITALS
TEMPERATURE: 98.1 F | OXYGEN SATURATION: 99 % | WEIGHT: 220.4 LBS | DIASTOLIC BLOOD PRESSURE: 74 MMHG | SYSTOLIC BLOOD PRESSURE: 140 MMHG | HEIGHT: 60 IN | HEART RATE: 100 BPM | BODY MASS INDEX: 43.27 KG/M2

## 2020-02-21 LAB
INFLUENZA A ANTIBODY: NEGATIVE
INFLUENZA B ANTIBODY: NEGATIVE

## 2020-02-21 PROCEDURE — 3017F COLORECTAL CA SCREEN DOC REV: CPT | Performed by: FAMILY MEDICINE

## 2020-02-21 PROCEDURE — 1123F ACP DISCUSS/DSCN MKR DOCD: CPT | Performed by: FAMILY MEDICINE

## 2020-02-21 PROCEDURE — G8400 PT W/DXA NO RESULTS DOC: HCPCS | Performed by: FAMILY MEDICINE

## 2020-02-21 PROCEDURE — G8482 FLU IMMUNIZE ORDER/ADMIN: HCPCS | Performed by: FAMILY MEDICINE

## 2020-02-21 PROCEDURE — 96372 THER/PROPH/DIAG INJ SC/IM: CPT | Performed by: FAMILY MEDICINE

## 2020-02-21 PROCEDURE — G8427 DOCREV CUR MEDS BY ELIG CLIN: HCPCS | Performed by: FAMILY MEDICINE

## 2020-02-21 PROCEDURE — G8417 CALC BMI ABV UP PARAM F/U: HCPCS | Performed by: FAMILY MEDICINE

## 2020-02-21 PROCEDURE — 1090F PRES/ABSN URINE INCON ASSESS: CPT | Performed by: FAMILY MEDICINE

## 2020-02-21 PROCEDURE — 4040F PNEUMOC VAC/ADMIN/RCVD: CPT | Performed by: FAMILY MEDICINE

## 2020-02-21 PROCEDURE — 99214 OFFICE O/P EST MOD 30 MIN: CPT | Performed by: FAMILY MEDICINE

## 2020-02-21 PROCEDURE — 87804 INFLUENZA ASSAY W/OPTIC: CPT | Performed by: FAMILY MEDICINE

## 2020-02-21 PROCEDURE — 1036F TOBACCO NON-USER: CPT | Performed by: FAMILY MEDICINE

## 2020-02-21 RX ORDER — LEVOFLOXACIN 750 MG/1
750 TABLET ORAL DAILY
Qty: 7 TABLET | Refills: 0 | Status: SHIPPED | OUTPATIENT
Start: 2020-02-21 | End: 2020-02-24 | Stop reason: ALTCHOICE

## 2020-02-21 RX ORDER — IPRATROPIUM BROMIDE AND ALBUTEROL SULFATE 2.5; .5 MG/3ML; MG/3ML
1 SOLUTION RESPIRATORY (INHALATION) ONCE
Status: COMPLETED | OUTPATIENT
Start: 2020-02-21 | End: 2020-02-21

## 2020-02-21 RX ORDER — PREDNISONE 20 MG/1
20 TABLET ORAL 2 TIMES DAILY
Qty: 10 TABLET | Refills: 0 | Status: SHIPPED | OUTPATIENT
Start: 2020-02-21 | End: 2020-02-26

## 2020-02-21 RX ORDER — CEFDINIR 300 MG/1
300 CAPSULE ORAL 2 TIMES DAILY
Qty: 14 CAPSULE | Refills: 0 | Status: SHIPPED | OUTPATIENT
Start: 2020-02-21 | End: 2020-02-21

## 2020-02-21 RX ORDER — METHYLPREDNISOLONE ACETATE 80 MG/ML
80 INJECTION, SUSPENSION INTRA-ARTICULAR; INTRALESIONAL; INTRAMUSCULAR; SOFT TISSUE ONCE
Status: COMPLETED | OUTPATIENT
Start: 2020-02-21 | End: 2020-02-21

## 2020-02-21 RX ADMIN — IPRATROPIUM BROMIDE AND ALBUTEROL SULFATE 1 AMPULE: 2.5; .5 SOLUTION RESPIRATORY (INHALATION) at 10:32

## 2020-02-21 RX ADMIN — METHYLPREDNISOLONE ACETATE 80 MG: 80 INJECTION, SUSPENSION INTRA-ARTICULAR; INTRALESIONAL; INTRAMUSCULAR; SOFT TISSUE at 10:32

## 2020-02-21 ASSESSMENT — PATIENT HEALTH QUESTIONNAIRE - PHQ9
SUM OF ALL RESPONSES TO PHQ QUESTIONS 1-9: 1
1. LITTLE INTEREST OR PLEASURE IN DOING THINGS: 1
2. FEELING DOWN, DEPRESSED OR HOPELESS: 0
SUM OF ALL RESPONSES TO PHQ9 QUESTIONS 1 & 2: 1
SUM OF ALL RESPONSES TO PHQ QUESTIONS 1-9: 1

## 2020-02-21 NOTE — PROGRESS NOTES
Progress Note    Koko Parekh is a 75 y.o.  female who presents today alone for evaluation of   Chief Complaint   Patient presents with    Cough     w/ yellow phlegm    Wheezing    Asthma           HPI:   Patient is here for sick visit today. Patient reports productive cough with ambulatory wheezing for the past 3-4 days. Patient is not a smoker. Patient states she has been taking her inhaler therapy as directed. However, she reports using her rescue inhaler daily since the onset of her illness. Patient admits to SOB/GOMEZ. Patient denies cp/le edema/dizziness/lightheadedness/blurry va/ha. Patient admits to sick contacts. Patient denies smoking. Patient denies n/v/d. Patient admits to subjective f/c.     Health Maintenance Due   Topic Date Due    Shingles Vaccine (1 of 2) 03/25/2003    Annual Wellness Visit (AWV)  05/29/2019        Current Medications:     Current Outpatient Medications   Medication Sig Dispense Refill    predniSONE (DELTASONE) 20 MG tablet Take 1 tablet by mouth 2 times daily for 5 days 10 tablet 0    cefdinir (OMNICEF) 300 MG capsule Take 1 capsule by mouth 2 times daily for 7 days 14 capsule 0    albuterol sulfate HFA (PROVENTIL HFA) 108 (90 Base) MCG/ACT inhaler Inhale 2 puffs into the lungs every 6 hours as needed for Wheezing 3 Inhaler 0    Calcium-Magnesium (JOSSY/MAG) 200-100 MG TABS Take by mouth      Misc Natural Products (LEG VEIN & CIRCULATION) TABS Take by mouth      hydrochlorothiazide (HYDRODIURIL) 50 MG tablet Take 1 tablet by mouth daily 90 tablet 3    lisinopril (PRINIVIL;ZESTRIL) 5 MG tablet Take 1 tablet by mouth daily 90 tablet 3    potassium chloride (MICRO-K) 10 MEQ extended release capsule take 1 capsule by mouth twice a day 60 capsule 11    omeprazole (PRILOSEC) 20 MG delayed release capsule Take 1 capsule by mouth Daily 90 capsule 3    albuterol (PROVENTIL) (2.5 MG/3ML) 0.083% nebulizer solution Take 3 mLs by nebulization every 6 hours as needed for 01/22/2020 139  mmol/L Final    Chloride 01/22/2020 102  mmol/L Final    Potassium 01/22/2020 3.9  mmol/L Final    BUN 01/22/2020 16  mg/dL Final    CREATININE 01/22/2020 0.72   Final    Glucose 01/22/2020 87  mg/dL Final    AST 01/22/2020 22  U/L Final    ALT 01/22/2020 26  U/L Final    Calcium 01/22/2020 9.6  mg/dL Final    Total Protein 01/22/2020 7.4   Final    CO2 01/22/2020 27  mmol/L Final    Alb 01/22/2020 4.2   Final    Alkaline Phosphatase 01/22/2020 120  U/L Final    Total Bilirubin 01/22/2020 1.0  0.1 - 1.4 mg/dL Final    Gfr Calculated 01/22/2020 >60   Final    Anion Gap 01/22/2020 10  mmol/L Final    WBC 01/22/2020 6.5  10^3/mL Final    RBC 01/22/2020 4.88  10^6/µL Final    Hemoglobin 01/22/2020 13.5  12.0 - 16.0 g/dL Final    Hematocrit 01/22/2020 40.3  36 - 46 % Final    MCV 01/22/2020 83  fL Final    MCH 01/22/2020 27.7  pg Final    MCHC 01/22/2020 33.5  g/dL Final    Platelets 45/43/7978 251  K/µL Final    RDW 01/22/2020 13.7  % Final    MPV 01/22/2020 9.2  fL Final    Neutrophils % 01/22/2020 56.4  % Final    Lymphocytes % 01/22/2020 31.9  % Final    Monocytes % 01/22/2020 7.2  % Final    Eosinophils % 01/22/2020 3.6  % Final    Basophils % 01/22/2020 0.9  % Final    Neutrophils Absolute 01/22/2020 3.7  /µL Final    Lymphocytes Absolute 01/22/2020 2.1  /µL Final    Monocytes Absolute 01/22/2020 0.5  /µL Final    Eosinophils Absolute 01/22/2020 0.2  /µL Final    Basophils Absolute 01/22/2020 0.1  /µL Final       Results for POC orders placed in visit on 02/21/20   POCT Influenza A/B   Result Value Ref Range    Influenza A Ab NEGATIVE     Influenza B Ab NEGATIVE               Assessment/Plan:     Anni Baird was seen today for cough, wheezing and asthma.     Diagnoses and all orders for this visit:    Acute bronchitis with asthma with acute exacerbation  -     ipratropium-albuterol (DUONEB) nebulizer solution 1 ampule  -     methylPREDNISolone acetate (DEPO-MEDROL)

## 2020-02-23 ENCOUNTER — NURSE TRIAGE (OUTPATIENT)
Dept: OTHER | Age: 65
End: 2020-02-23

## 2020-02-23 NOTE — TELEPHONE ENCOUNTER
Reason for Disposition   MODERATE difficulty breathing (e.g., speaks in phrases, SOB even at rest, pulse 100-120)    Answer Assessment - Initial Assessment Questions  1. SYMPTOMS: \"What symptoms are you most concerned about? \" \"Is it better, the same, or worse compared to when you saw the doctor? \"     Shortness of breath2. BREATHING DIFFICULTY: Ariana Gene you having any difficulty breathing? \" If so, ask \"How bad is it? \"  (e.g., none, mild, moderate, severe) is short of breath and is coughing a lot   - MILD: No SOB at rest, mild SOB with walking, speaks normally in sentences, can lay down, no retractions, pulse < 100.     - MODERATE: SOB at rest, SOB with minimal exertion and prefers to sit, cannot lie down flat, speaks in phrases, mild retractions, audible wheezing, pulse 100-120.     - SEVERE: Very SOB at rest, speaks in single words, struggling to breathe, sitting hunched forward, retractions, pulse > 120       States she is wheezing and is coughing, gets short of breath as she is walking  3. FEVER: \"Do you have a fever? \" If so, ask: \"What is your temperature, how was it measured, and when did it start? \"      Is afebrile  4. SPUTUM: \"Describe the color of your sputum\" (clear, white, yellow, green, blood-tinged)      Yellow to white sputum  5. DIAGNOSIS CONFIRMATION: \"When was the pneumonia diagnosed? \" \"By whom? \"      Friday  6. ANTIBIOTIC: Ariana Gene you taking an antibiotic? \"  If so, \"Which one?\" \"When was it started? \"      Levofloxacin, prednisone  7. OTHER TREATMENT: Ariana Gene you receiving any other treatment for the pneumonia? \" (e.g., albuterol nebulizer, oxygen) If so, ask, \"How often? \" and \"Do they help? \"      Albuterol last one was at 6:30pm  8. HOSPITAL ADMISSION: \"Were you hospitalized for this pneumonia? \" If so, ask \"When were you discharged home from the hospital?\"      no    Protocols used: PNEUMONIA ON ANTIBIOTIC FOLLOW-UP CALL-ADULT-AH    Caller stated that she \" awoke short of breath and is coughing quite a This office note has been dictated.     No

## 2020-02-24 ENCOUNTER — OFFICE VISIT (OUTPATIENT)
Dept: FAMILY MEDICINE CLINIC | Age: 65
End: 2020-02-24
Payer: MEDICARE

## 2020-02-24 ENCOUNTER — NURSE TRIAGE (OUTPATIENT)
Dept: OTHER | Facility: CLINIC | Age: 65
End: 2020-02-24

## 2020-02-24 VITALS
OXYGEN SATURATION: 97 % | WEIGHT: 217.3 LBS | DIASTOLIC BLOOD PRESSURE: 68 MMHG | HEART RATE: 124 BPM | SYSTOLIC BLOOD PRESSURE: 140 MMHG | BODY MASS INDEX: 42.66 KG/M2

## 2020-02-24 PROCEDURE — 99213 OFFICE O/P EST LOW 20 MIN: CPT | Performed by: FAMILY MEDICINE

## 2020-02-24 PROCEDURE — 1036F TOBACCO NON-USER: CPT | Performed by: FAMILY MEDICINE

## 2020-02-24 PROCEDURE — 4040F PNEUMOC VAC/ADMIN/RCVD: CPT | Performed by: FAMILY MEDICINE

## 2020-02-24 PROCEDURE — G8482 FLU IMMUNIZE ORDER/ADMIN: HCPCS | Performed by: FAMILY MEDICINE

## 2020-02-24 PROCEDURE — 1123F ACP DISCUSS/DSCN MKR DOCD: CPT | Performed by: FAMILY MEDICINE

## 2020-02-24 PROCEDURE — G8417 CALC BMI ABV UP PARAM F/U: HCPCS | Performed by: FAMILY MEDICINE

## 2020-02-24 PROCEDURE — 3017F COLORECTAL CA SCREEN DOC REV: CPT | Performed by: FAMILY MEDICINE

## 2020-02-24 PROCEDURE — 1090F PRES/ABSN URINE INCON ASSESS: CPT | Performed by: FAMILY MEDICINE

## 2020-02-24 PROCEDURE — G8427 DOCREV CUR MEDS BY ELIG CLIN: HCPCS | Performed by: FAMILY MEDICINE

## 2020-02-24 PROCEDURE — G8400 PT W/DXA NO RESULTS DOC: HCPCS | Performed by: FAMILY MEDICINE

## 2020-02-24 RX ORDER — LEVOFLOXACIN 500 MG/1
500 TABLET, FILM COATED ORAL
COMMUNITY
End: 2020-04-03

## 2020-02-24 ASSESSMENT — ENCOUNTER SYMPTOMS
DIARRHEA: 0
SORE THROAT: 0
SHORTNESS OF BREATH: 1
WHEEZING: 1
SINUS PRESSURE: 0
SINUS PAIN: 1
NAUSEA: 0
VOMITING: 0
BACK PAIN: 0
COUGH: 1

## 2020-02-24 NOTE — TELEPHONE ENCOUNTER
Patient called stating that she was diagnosed with Pneumonia at the office on Friday and went to the ED Saturday at about 1 am because she woke up short of breath. Patient is asking if she could take benadryl to help her sleep. Writer reviewed care instructions in the protocol and patient will try some honey tonight to reduce coughing and call the office in the morning.
words, struggling to breathe, sitting hunched forward, retractions, pulse > 120        Mild, lota of coughing. 11. FEVER: \"Do you have a fever? \" If so, ask: \"What is it, how was it measured and when did it start? \"        No.    12. OTHER SYMPTOMS: \"Do you have any other symptoms? \" (e.g., weakness, confusion, pain)        *No Answer*    Protocols used: PNEUMONIA ON ANTIBIOTIC POST-HOSPITALIZATION FOLLOW-UP CALL-ADULT-, MEDICATION QUESTION CALL-ADULT-

## 2020-02-24 NOTE — PROGRESS NOTES
HENT: Positive for congestion and sinus pain. Negative for sinus pressure and sore throat. Eyes: Negative for visual disturbance. Respiratory: Positive for cough, shortness of breath and wheezing. Cardiovascular: Positive for chest pain. Negative for leg swelling. Gastrointestinal: Negative for diarrhea, nausea and vomiting. Genitourinary: Negative for dysuria, menstrual problem, urgency and vaginal discharge. Musculoskeletal: Negative for arthralgias, back pain and myalgias. Skin: Negative for rash. Neurological: Positive for headaches. Negative for weakness and numbness. Psychiatric/Behavioral: Positive for sleep disturbance.       :   BP (!) 140/68   Pulse 124   Wt 217 lb 4.8 oz (98.6 kg)   SpO2 97%   BMI 42.66 kg/m²     Physical Exam  Constitutional:       General: She is not in acute distress. Appearance: She is well-developed. She is not diaphoretic. HENT:      Head: Normocephalic and atraumatic. Mouth/Throat:      Pharynx: No oropharyngeal exudate. Eyes:      General: No scleral icterus. Neck:      Musculoskeletal: Neck supple. Vascular: No carotid bruit. Cardiovascular:      Heart sounds: No murmur. No friction rub. No gallop. Pulmonary:      Effort: No respiratory distress. Breath sounds: Decreased air movement present. Examination of the left-middle field reveals wheezing. Examination of the right-lower field reveals decreased breath sounds. Examination of the left-lower field reveals decreased breath sounds. Decreased breath sounds and wheezing present. No rales. Chest:      Chest wall: No tenderness. Lymphadenopathy:      Cervical: No cervical adenopathy. Skin:     Findings: No rash. Neurological:      Mental Status: She is alert and oriented to person, place, and time. Cranial Nerves: No cranial nerve deficit. Psychiatric:         Behavior: Behavior normal.         Thought Content:  Thought content normal.         Judgment: Judgment

## 2020-02-26 RX ORDER — POTASSIUM CHLORIDE 750 MG/1
CAPSULE, EXTENDED RELEASE ORAL
Qty: 180 CAPSULE | Refills: 10 | Status: SHIPPED | OUTPATIENT
Start: 2020-02-26 | End: 2021-01-28

## 2020-02-28 ENCOUNTER — TELEPHONE (OUTPATIENT)
Dept: FAMILY MEDICINE CLINIC | Age: 65
End: 2020-02-28

## 2020-02-28 RX ORDER — LEVOFLOXACIN 500 MG/1
500 TABLET, FILM COATED ORAL DAILY
Qty: 4 TABLET | Refills: 0 | Status: SHIPPED | OUTPATIENT
Start: 2020-02-28 | End: 2020-03-03

## 2020-02-28 NOTE — TELEPHONE ENCOUNTER
[id number] Yuliet Cisneros I sent four pills to [facility] in her records I did not know which pharmacy she wanted this to go to so I picked this one at random. Let her know rx was called in to [facility].

## 2020-02-28 NOTE — TELEPHONE ENCOUNTER
levaquin 500 mg #4 one qd for four days. K is chronic med for replacement therapy due to water pill. Does she need a refill for this? Ok if needs.

## 2020-03-12 ENCOUNTER — TELEPHONE (OUTPATIENT)
Dept: FAMILY MEDICINE CLINIC | Age: 65
End: 2020-03-12

## 2020-03-12 NOTE — TELEPHONE ENCOUNTER
Pt is doing a little better but she does not feel like she is able to go back to work.  She would like a note that  Has a return date of 3/30/20

## 2020-03-12 NOTE — TELEPHONE ENCOUNTER
Pt has been working from home so she has not actually missed work. She is going to call her office and ask them to let her work from home for 2 more weeks.  She will only need the note if they say no to her request

## 2020-03-16 RX ORDER — ALBUTEROL SULFATE 2.5 MG/3ML
2.5 SOLUTION RESPIRATORY (INHALATION) EVERY 6 HOURS PRN
Qty: 120 VIAL | Refills: 2 | Status: SHIPPED | OUTPATIENT
Start: 2020-03-16 | End: 2020-03-17 | Stop reason: SDUPTHER

## 2020-03-16 NOTE — TELEPHONE ENCOUNTER
Last visit: 2/24/2020  Last Med refill:   Does patient have enough medication for 72 hours:     Next Visit Date:  Future Appointments   Date Time Provider Gloria Elise   6/2/2020  1:00 PM MD ROBERTO Santoyo [facility] [address] [facility]   7/27/2020 10:45 AM Nj Cohen MD [address] [facility] Maintenance   Topic Date Due    Shingles Vaccine (1 of 2) 03/25/2003    Annual Wellness Visit (AWV)  05/29/2019    Breast cancer screen  07/10/2020    Potassium monitoring  01/22/2021    Creatinine monitoring  01/22/2021    Pneumococcal 65+ years Vaccine (2 of 2 - PPSV23) 07/19/2021    Lipid screen  01/22/2025    Colon cancer screen colonoscopy  02/10/2026    DTaP/Tdap/Td vaccine (2 - Td) 07/11/2028    DEXA (modify frequency per FRAX score)  Completed    Flu vaccine  Completed    Hepatitis C screen  Completed    Hepatitis A vaccine  Aged Out    Hepatitis B vaccine  Aged Out    Hib vaccine  Aged Out    Meningococcal (ACWY) vaccine  Aged Out       No results found for: LABA1C          ( goal A1C is < 7)   No results found for: LABMICR  LDL Calculated (mg/dL)   Date Value   01/22/2020 113   07/12/2019 90       (goal LDL is <100)   AST (U/L)   Date Value   01/22/2020 22     ALT (U/L)   Date Value   01/22/2020 26     BUN (mg/dL)   Date Value   01/22/2020 16     BP Readings from Last 3 Encounters:   02/24/20 (!) 140/68   02/21/20 (!) 140/74   01/27/20 122/70          (goal 120/80)    All Future Testing planned in [facility]  Lab Frequency Next Occurrence   CBC Auto Differential Once 07/06/2020   Comprehensive Metabolic Panel Once 40/22/8542   Lipid Panel Once 07/06/2020   XR CHEST STANDARD (2 VW) Once 03/21/2020   XR CHEST STANDARD (2 VW) Once 02/24/2020               Patient Active Problem List:     Asthma     Obesity     OA (osteoarthritis)     Hyperlipidemia     BPPV (benign paroxysmal positional vertigo)     GERD (gastroesophageal reflux disease)     Essential hypertension     Anaphylaxis     Other

## 2020-03-17 RX ORDER — ALBUTEROL SULFATE 2.5 MG/3ML
2.5 SOLUTION RESPIRATORY (INHALATION) EVERY 6 HOURS PRN
Qty: 120 VIAL | Refills: 2 | Status: SHIPPED | OUTPATIENT
Start: 2020-03-17 | End: 2022-01-18 | Stop reason: SDUPTHER

## 2020-03-19 ENCOUNTER — TELEPHONE (OUTPATIENT)
Dept: FAMILY MEDICINE CLINIC | Age: 65
End: 2020-03-19

## 2020-03-19 NOTE — TELEPHONE ENCOUNTER
So many questions. I would advise she call the health department about her risks and possible need to screen. We do not have anything here to screen her. Back pain issues may need appt and exam to sort out but need to clarify her risks for Covid-19 infection.

## 2020-03-20 ENCOUNTER — TELEPHONE (OUTPATIENT)
Dept: FAMILY MEDICINE CLINIC | Age: 65
End: 2020-03-20

## 2020-03-20 RX ORDER — PREDNISONE 20 MG/1
TABLET ORAL
Qty: 18 TABLET | Refills: 0 | Status: SHIPPED | OUTPATIENT
Start: 2020-03-20 | End: 2020-03-30

## 2020-03-20 NOTE — TELEPHONE ENCOUNTER
Solumedrol is only available as injection. I would offer her prednisone taper 60 x 3, 40 x 3, 20 x 3 days. #18 20 mg. See me sometime next week in follow up.

## 2020-04-03 ENCOUNTER — OFFICE VISIT (OUTPATIENT)
Dept: PRIMARY CARE CLINIC | Age: 65
End: 2020-04-03
Payer: MEDICARE

## 2020-04-03 VITALS
WEIGHT: 218 LBS | BODY MASS INDEX: 42.8 KG/M2 | TEMPERATURE: 100.9 F | HEIGHT: 60 IN | OXYGEN SATURATION: 98 % | DIASTOLIC BLOOD PRESSURE: 90 MMHG | HEART RATE: 126 BPM | SYSTOLIC BLOOD PRESSURE: 159 MMHG

## 2020-04-03 LAB
INFLUENZA A ANTIBODY: NEGATIVE
INFLUENZA B ANTIBODY: NEGATIVE

## 2020-04-03 PROCEDURE — 1090F PRES/ABSN URINE INCON ASSESS: CPT | Performed by: FAMILY MEDICINE

## 2020-04-03 PROCEDURE — 99214 OFFICE O/P EST MOD 30 MIN: CPT | Performed by: FAMILY MEDICINE

## 2020-04-03 PROCEDURE — 1036F TOBACCO NON-USER: CPT | Performed by: FAMILY MEDICINE

## 2020-04-03 PROCEDURE — 4040F PNEUMOC VAC/ADMIN/RCVD: CPT | Performed by: FAMILY MEDICINE

## 2020-04-03 PROCEDURE — 87804 INFLUENZA ASSAY W/OPTIC: CPT | Performed by: FAMILY MEDICINE

## 2020-04-03 PROCEDURE — 1123F ACP DISCUSS/DSCN MKR DOCD: CPT | Performed by: FAMILY MEDICINE

## 2020-04-03 PROCEDURE — G8428 CUR MEDS NOT DOCUMENT: HCPCS | Performed by: FAMILY MEDICINE

## 2020-04-03 PROCEDURE — 3023F SPIROM DOC REV: CPT | Performed by: FAMILY MEDICINE

## 2020-04-03 PROCEDURE — G8417 CALC BMI ABV UP PARAM F/U: HCPCS | Performed by: FAMILY MEDICINE

## 2020-04-03 PROCEDURE — G8400 PT W/DXA NO RESULTS DOC: HCPCS | Performed by: FAMILY MEDICINE

## 2020-04-03 PROCEDURE — 3017F COLORECTAL CA SCREEN DOC REV: CPT | Performed by: FAMILY MEDICINE

## 2020-04-03 PROCEDURE — G8926 SPIRO NO PERF OR DOC: HCPCS | Performed by: FAMILY MEDICINE

## 2020-04-03 RX ORDER — BENZONATATE 200 MG/1
200 CAPSULE ORAL 3 TIMES DAILY PRN
Qty: 21 CAPSULE | Refills: 0 | Status: SHIPPED | OUTPATIENT
Start: 2020-04-03 | End: 2020-05-21 | Stop reason: ALTCHOICE

## 2020-04-03 RX ORDER — LEVOFLOXACIN 500 MG/1
500 TABLET, FILM COATED ORAL DAILY
Qty: 10 TABLET | Refills: 0 | Status: SHIPPED | OUTPATIENT
Start: 2020-04-03 | End: 2020-04-13

## 2020-04-03 NOTE — PATIENT INSTRUCTIONS
room and away from other people in your home. Also, you should use a separate bathroom, if available. Animals: You should restrict contact with pets and other animals while you are sick with COVID-19, just like you would around other people. Although there have not been reports of pets or other animals becoming sick with COVID-19, it is still recommended that people sick with COVID-19 limit contact with animals until more information is known about the virus. When possible, have another member of your household care for your animals while you are sick. If you are sick with COVID-19, avoid contact with your pet, including petting, snuggling, being kissed or licked, and sharing food. If you must care for your pet or be around animals while you are sick, wash your hands before and after you interact with pets and wear a facemask. Call ahead before visiting your doctor  If you have a medical appointment, call the healthcare provider and tell them that you have or may have COVID-19. This will help the healthcare providers office take steps to keep other people from getting infected or exposed. Wear a facemask  You should wear a facemask when you are around other people (e.g., sharing a room or vehicle) or pets and before you enter a healthcare providers office. If you are not able to wear a facemask (for example, because it causes trouble breathing), then people who live with you should not stay in the same room with you, or they should wear a facemask if they enter your room. Cover your coughs and sneezes  Cover your mouth and nose with a tissue when you cough or sneeze. Throw used tissues in a lined trash can. Immediately wash your hands with soap and water for at least 20 seconds or, if soap and water are not available, clean your hands with an alcohol-based hand  that contains at least 60% alcohol.   Clean your hands often  Wash your hands often with soap and water for at least 20 seconds, especially

## 2020-04-06 ENCOUNTER — TELEPHONE (OUTPATIENT)
Dept: FAMILY MEDICINE CLINIC | Age: 65
End: 2020-04-06

## 2020-04-06 NOTE — TELEPHONE ENCOUNTER
I think most of our Pulmonary specialists have closed down outpatient visits due to being occupied with inpatient care of patients with covid 19 patients. But if I am wrong by all means she may see pulmonary medicine. If not able to get in a CXR would likely answer some questions about her current status.

## 2020-04-07 ENCOUNTER — HOSPITAL ENCOUNTER (OUTPATIENT)
Facility: CLINIC | Age: 65
Discharge: HOME | End: 2020-04-09
Payer: MEDICARE

## 2020-04-07 ENCOUNTER — HOSPITAL ENCOUNTER (OUTPATIENT)
Dept: GENERAL RADIOLOGY | Facility: CLINIC | Age: 65
Discharge: HOME | End: 2020-04-09
Payer: MEDICARE

## 2020-04-07 PROCEDURE — 71046 X-RAY EXAM CHEST 2 VIEWS: CPT

## 2020-04-13 ENCOUNTER — TELEPHONE (OUTPATIENT)
Dept: FAMILY MEDICINE CLINIC | Age: 65
End: 2020-04-13

## 2020-04-14 ENCOUNTER — HOSPITAL ENCOUNTER (OUTPATIENT)
Age: 65
Setting detail: SPECIMEN
Discharge: HOME | End: 2020-04-14
Payer: MEDICARE

## 2020-04-14 ENCOUNTER — OFFICE VISIT (OUTPATIENT)
Dept: PRIMARY CARE CLINIC | Age: 65
End: 2020-04-14
Payer: MEDICARE

## 2020-04-14 ENCOUNTER — TELEPHONE (OUTPATIENT)
Dept: FAMILY MEDICINE CLINIC | Age: 65
End: 2020-04-14

## 2020-04-14 VITALS
HEART RATE: 114 BPM | HEIGHT: 60 IN | OXYGEN SATURATION: 97 % | WEIGHT: 210 LBS | BODY MASS INDEX: 41.23 KG/M2 | SYSTOLIC BLOOD PRESSURE: 134 MMHG | TEMPERATURE: 98 F | DIASTOLIC BLOOD PRESSURE: 79 MMHG

## 2020-04-14 LAB
INFLUENZA A ANTIBODY: NEGATIVE
INFLUENZA B ANTIBODY: NEGATIVE

## 2020-04-14 PROCEDURE — 4040F PNEUMOC VAC/ADMIN/RCVD: CPT | Performed by: FAMILY MEDICINE

## 2020-04-14 PROCEDURE — 99214 OFFICE O/P EST MOD 30 MIN: CPT | Performed by: FAMILY MEDICINE

## 2020-04-14 PROCEDURE — G8427 DOCREV CUR MEDS BY ELIG CLIN: HCPCS | Performed by: FAMILY MEDICINE

## 2020-04-14 PROCEDURE — 87804 INFLUENZA ASSAY W/OPTIC: CPT | Performed by: FAMILY MEDICINE

## 2020-04-14 PROCEDURE — G8417 CALC BMI ABV UP PARAM F/U: HCPCS | Performed by: FAMILY MEDICINE

## 2020-04-14 PROCEDURE — 1036F TOBACCO NON-USER: CPT | Performed by: FAMILY MEDICINE

## 2020-04-14 PROCEDURE — 1090F PRES/ABSN URINE INCON ASSESS: CPT | Performed by: FAMILY MEDICINE

## 2020-04-14 PROCEDURE — 1123F ACP DISCUSS/DSCN MKR DOCD: CPT | Performed by: FAMILY MEDICINE

## 2020-04-14 PROCEDURE — 3017F COLORECTAL CA SCREEN DOC REV: CPT | Performed by: FAMILY MEDICINE

## 2020-04-14 PROCEDURE — G8400 PT W/DXA NO RESULTS DOC: HCPCS | Performed by: FAMILY MEDICINE

## 2020-04-14 RX ORDER — UBIDECARENONE 75 MG
50 CAPSULE ORAL DAILY
COMMUNITY
End: 2021-08-23

## 2020-04-14 RX ORDER — ASCORBIC ACID 500 MG
1000 TABLET ORAL DAILY
COMMUNITY
End: 2021-08-23

## 2020-04-14 NOTE — PATIENT INSTRUCTIONS
with at least 60% alcohol, covering all surfaces of your hands and rubbing them together until they feel dry. Soap and water are the best option if hands are visibly dirty. Avoid touching your eyes, nose, and mouth with unwashed hands. Avoid sharing personal household items  You should not share dishes, drinking glasses, cups, eating utensils, towels, or bedding with other people or pets in your home. After using these items, they should be washed thoroughly with soap and water. Clean all high-touch surfaces everyday  High touch surfaces include counters, tabletops, doorknobs, bathroom fixtures, toilets, phones, keyboards, tablets, and bedside tables. Also, clean any surfaces that may have blood, stool, or body fluids on them. Use a household cleaning spray or wipe, according to the label instructions. Labels contain instructions for safe and effective use of the cleaning product including precautions you should take when applying the product, such as wearing gloves and making sure you have good ventilation during use of the product. Monitor your symptoms  Seek prompt medical attention if your illness is worsening (e.g., difficulty breathing). Before seeking care, call your healthcare provider and tell them that you have, or are being evaluated for, COVID-19. Put on a facemask before you enter the facility. These steps will help the healthcare providers office to keep other people in the office or waiting room from getting infected or exposed. Persons who are placed under active monitoring or facilitated self-monitoring should follow instructions provided by their local health department or occupational health professionals, as appropriate. When working with your local health department check their available hours. If you have a medical emergency and need to call 911, notify the dispatch personnel that you have, or are being evaluated for COVID-19.  If possible, put on a facemask before emergency medical services arrive. Discontinuing home isolation  Patients with confirmed COVID-19 should remain under home isolation precautions until the risk of secondary transmission to others is thought to be low. The decision to discontinue home isolation precautions should be made on a case-by-case basis, in consultation with your physician and the health department. Please do NOT make this decision on your own. If your results of the COVID-19 test is NEGATIVE -     The patient may stop isolation, in consultation with your health care provider, typically when: Your healthcare provider has determined that the cause of the illness is NOT COVID-19 and approves your return to work. OR  Seven days have passed since onset of symptoms AND three days (72 hours) have passed with no fever without taking medication (like Tylenol) to reduce fever,  respiratory symptoms have resolved and you have been evaluated by your health care provider. Please follow up with your physician for evaluation about this. The following websites are the best places for up to date information on this fluid situation. https://coronavirus. [place].gov/wps/portal/gov/covid-19/home/[facility]-health-districts-and-providers/guidance-for-covid-19-exposure-management    Preventing the Spread of Coronavirus Disease 2019 in Homes and Residential Communities     For the most recent information go to [employer]  https://coronavirus. [place].gov/wps/portal/gov/covid-19/home/local-health-districts-and-providers/guidance-for-covid-19-exposure-management

## 2020-04-14 NOTE — PROGRESS NOTES
people from getting infected or exposed. Wear a facemask  You should wear a facemask when you are around other people (e.g., sharing a room or vehicle) or pets and before you enter a healthcare providers office. If you are not able to wear a facemask (for example, because it causes trouble breathing), then people who live with you should not stay in the same room with you; they should also wear a facemask if they enter your room. Cover your coughs and sneezes  Cover your mouth and nose with a tissue when you cough or sneeze. Throw used tissues in a lined trash can. Immediately wash your hands with soap and water for at least 20 seconds or, if soap and water are not available, clean your hands with an alcohol-based hand  that contains at least 60% alcohol. Clean your hands often  Wash your hands often with soap and water for at least 20 seconds, especially after blowing your nose, coughing, or sneezing; going to the bathroom; and before eating or preparing food. If soap and water are not readily available, use an alcohol-based hand  with at least 60% alcohol, covering all surfaces of your hands and rubbing them together until they feel dry. Soap and water are the best option if hands are visibly dirty. Avoid touching your eyes, nose, and mouth with unwashed hands. Avoid sharing personal household items  You should not share dishes, drinking glasses, cups, eating utensils, towels, or bedding with other people or pets in your home. After using these items, they should be washed thoroughly with soap and water. Clean all high-touch surfaces everyday  High touch surfaces include counters, tabletops, doorknobs, bathroom fixtures, toilets, phones, keyboards, tablets, and bedside tables. Also, clean any surfaces that may have blood, stool, or body fluids on them. Use a household cleaning spray or wipe, according to the label instructions.  Labels contain instructions for safe and effective use of the cleaning product including precautions you should take when applying the product, such as wearing gloves and making sure you have good ventilation during use of the product. Monitor your symptoms  Seek prompt medical attention if your illness is worsening (e.g., difficulty breathing). Before seeking care, call your healthcare provider and tell them that you have, or are being evaluated for, COVID-19. Put on a facemask before you enter the facility. These steps will help the healthcare providers office to keep other people in the office or waiting room from getting infected or exposed. Persons who are placed under active monitoring or facilitated self-monitoring should follow instructions provided by their local health department or occupational health professionals, as appropriate. When working with your local health department check their available hours. If you have a medical emergency and need to call 911, notify the dispatch personnel that you have, or are being evaluated for COVID-19. If possible, put on a facemask before emergency medical services arrive. Discontinuing home isolation  Patients with confirmed COVID-19 should remain under home isolation precautions until the risk of secondary transmission to others is thought to be low. The decision to discontinue home isolation precautions should be made on a case-by-case basis, in consultation with your physician and the health department. Please do NOT make this decision on your own. If your results of the COVID-19 test is NEGATIVE -     The patient may stop isolation, in consultation with your health care provider, typically when: Your healthcare provider has determined that the cause of the illness is NOT COVID-19 and approves your return to work.   OR  Seven days have passed since onset of symptoms AND three days (72 hours) have passed with no fever without taking medication (like Tylenol) to reduce fever,  respiratory symptoms have resolved

## 2020-04-15 ENCOUNTER — TELEPHONE (OUTPATIENT)
Dept: FAMILY MEDICINE CLINIC | Age: 65
End: 2020-04-15

## 2020-04-16 LAB — SARS-COV-2, NAA: NOT DETECTED

## 2020-04-17 ENCOUNTER — TELEMEDICINE (OUTPATIENT)
Dept: FAMILY MEDICINE CLINIC | Age: 65
End: 2020-04-17
Payer: MEDICARE

## 2020-04-17 ENCOUNTER — TELEPHONE (OUTPATIENT)
Dept: PRIMARY CARE CLINIC | Age: 65
End: 2020-04-17

## 2020-04-17 PROCEDURE — 3017F COLORECTAL CA SCREEN DOC REV: CPT | Performed by: FAMILY MEDICINE

## 2020-04-17 PROCEDURE — 1090F PRES/ABSN URINE INCON ASSESS: CPT | Performed by: FAMILY MEDICINE

## 2020-04-17 PROCEDURE — 1123F ACP DISCUSS/DSCN MKR DOCD: CPT | Performed by: FAMILY MEDICINE

## 2020-04-17 PROCEDURE — 4040F PNEUMOC VAC/ADMIN/RCVD: CPT | Performed by: FAMILY MEDICINE

## 2020-04-17 PROCEDURE — G8400 PT W/DXA NO RESULTS DOC: HCPCS | Performed by: FAMILY MEDICINE

## 2020-04-17 PROCEDURE — G8428 CUR MEDS NOT DOCUMENT: HCPCS | Performed by: FAMILY MEDICINE

## 2020-04-17 PROCEDURE — 99213 OFFICE O/P EST LOW 20 MIN: CPT | Performed by: FAMILY MEDICINE

## 2020-04-17 ASSESSMENT — ENCOUNTER SYMPTOMS
VOMITING: 0
COUGH: 1
NAUSEA: 0
SHORTNESS OF BREATH: 1
DIARRHEA: 0
SINUS PRESSURE: 0
BACK PAIN: 0
SORE THROAT: 0

## 2020-04-17 NOTE — PROGRESS NOTES
answers and she will need to follow up. See us prn sx dictate. --Amarilys Ware MD on 4/17/2020 at 3:03 PM    An electronic signature was used to authenticate this note.

## 2020-04-23 ENCOUNTER — TELEPHONE (OUTPATIENT)
Dept: FAMILY MEDICINE CLINIC | Age: 65
End: 2020-04-23

## 2020-05-04 ENCOUNTER — TELEPHONE (OUTPATIENT)
Dept: FAMILY MEDICINE CLINIC | Age: 65
End: 2020-05-04

## 2020-05-04 DIAGNOSIS — F41.9 ANXIETY: Primary | ICD-10-CM

## 2020-05-04 RX ORDER — LORAZEPAM 0.5 MG/1
0.5 TABLET ORAL EVERY 8 HOURS PRN
Qty: 30 TABLET | Refills: 0 | Status: SHIPPED | OUTPATIENT
Start: 2020-05-04 | End: 2020-05-18

## 2020-05-04 RX ORDER — OMEPRAZOLE 20 MG/1
20 CAPSULE, DELAYED RELEASE ORAL DAILY
Qty: 90 CAPSULE | Refills: 3 | Status: SHIPPED | OUTPATIENT
Start: 2020-05-04 | End: 2020-09-03 | Stop reason: ALTCHOICE

## 2020-05-04 NOTE — TELEPHONE ENCOUNTER
Pt wanted to let you know she was at [facility]. 4/27/2020-5/1/2020 due to her breathing. She sees Dr. Princess Red for pulmonology. She is requesting Ativan due to anxiety issues. Please advise.  TY.

## 2020-05-20 ENCOUNTER — TELEPHONE (OUTPATIENT)
Dept: FAMILY MEDICINE CLINIC | Age: 65
End: 2020-05-20

## 2020-05-20 NOTE — TELEPHONE ENCOUNTER
Pts pulmonologist recommended she comes to see you face to face and \"gets back out in the world again\" . Ok to schedule?  Please advise, thx.

## 2020-05-21 ENCOUNTER — OFFICE VISIT (OUTPATIENT)
Dept: FAMILY MEDICINE CLINIC | Age: 65
End: 2020-05-21
Payer: MEDICARE

## 2020-05-21 VITALS
OXYGEN SATURATION: 98 % | BODY MASS INDEX: 39.78 KG/M2 | SYSTOLIC BLOOD PRESSURE: 128 MMHG | HEART RATE: 112 BPM | TEMPERATURE: 96.5 F | WEIGHT: 202.6 LBS | DIASTOLIC BLOOD PRESSURE: 60 MMHG | HEIGHT: 60 IN

## 2020-05-21 PROCEDURE — G8427 DOCREV CUR MEDS BY ELIG CLIN: HCPCS | Performed by: FAMILY MEDICINE

## 2020-05-21 PROCEDURE — 3017F COLORECTAL CA SCREEN DOC REV: CPT | Performed by: FAMILY MEDICINE

## 2020-05-21 PROCEDURE — 1036F TOBACCO NON-USER: CPT | Performed by: FAMILY MEDICINE

## 2020-05-21 PROCEDURE — G8400 PT W/DXA NO RESULTS DOC: HCPCS | Performed by: FAMILY MEDICINE

## 2020-05-21 PROCEDURE — G8417 CALC BMI ABV UP PARAM F/U: HCPCS | Performed by: FAMILY MEDICINE

## 2020-05-21 PROCEDURE — 1123F ACP DISCUSS/DSCN MKR DOCD: CPT | Performed by: FAMILY MEDICINE

## 2020-05-21 PROCEDURE — 99213 OFFICE O/P EST LOW 20 MIN: CPT | Performed by: FAMILY MEDICINE

## 2020-05-21 PROCEDURE — 4040F PNEUMOC VAC/ADMIN/RCVD: CPT | Performed by: FAMILY MEDICINE

## 2020-05-21 PROCEDURE — 1090F PRES/ABSN URINE INCON ASSESS: CPT | Performed by: FAMILY MEDICINE

## 2020-05-21 RX ORDER — LORAZEPAM 0.5 MG/1
0.5 TABLET ORAL EVERY 8 HOURS PRN
Qty: 60 TABLET | Refills: 1 | Status: SHIPPED | OUTPATIENT
Start: 2020-05-21 | End: 2020-07-20

## 2020-05-21 ASSESSMENT — ENCOUNTER SYMPTOMS
COUGH: 0
DIARRHEA: 0
SORE THROAT: 0
BACK PAIN: 0
SINUS PRESSURE: 0
SHORTNESS OF BREATH: 1
VOMITING: 0
NAUSEA: 0

## 2020-05-21 NOTE — PROGRESS NOTES
Alverto Chow is a 77 y.o. female who presents todayfor her medical conditions/complaints as noted below. Alverto Chow is here today c/oAnxiety      :     HPI    Routine follow up she was encouraged by Pulmonary to make appt with us to discuss plan for her anxiety issues. She feels the ativan helped her to avoid focus on her dyspnea. She notes she had been on anti depressants in the past and she did not do well with them around her change of life. She agrees to pursuing counciling services. Past Medical History:   Diagnosis Date    Asthma     Hypertension     OA (osteoarthritis)     Obesity       Past Surgical History:   Procedure Laterality Date    COLONOSCOPY      2006; 2016; due 2026     No family history on file. Social History     Tobacco Use    Smoking status: Never Smoker    Smokeless tobacco: Never Used   Substance Use Topics    Alcohol use: Yes     Comment: rare       Current Outpatient Medications   Medication Sig Dispense Refill    LORazepam (ATIVAN) 0.5 MG tablet Take 1 tablet by mouth every 8 hours as needed for Anxiety for up to 60 days.  60 tablet 1    omeprazole (PRILOSEC) 20 MG delayed release capsule Take 1 capsule by mouth Daily 90 capsule 3    vitamin C (ASCORBIC ACID) 500 MG tablet Take 1,000 mg by mouth daily      vitamin B-12 (CYANOCOBALAMIN) 100 MCG tablet Take 50 mcg by mouth daily      albuterol (PROVENTIL) (2.5 MG/3ML) 0.083% nebulizer solution Take 3 mLs by nebulization every 6 hours as needed for Wheezing or Shortness of Breath 120 vial 2    potassium chloride (MICRO-K) 10 MEQ extended release capsule TAKE ONE CAPSULE BY MOUTH TWICE DAILY 180 capsule 10    albuterol sulfate HFA (PROVENTIL HFA) 108 (90 Base) MCG/ACT inhaler Inhale 2 puffs into the lungs every 6 hours as needed for Wheezing 3 Inhaler 0    hydrochlorothiazide (HYDRODIURIL) 50 MG tablet Take 1 tablet by mouth daily 90 tablet 3    lisinopril (PRINIVIL;ZESTRIL) 5 MG tablet Take 1 tablet by mouth daily 90 tablet 3     No current facility-administered medications for this visit. Allergies   Allergen Reactions    Daypro [Oxaprozin]     Tetracyclines & Related     Erythromycin Rash         Subjective:   Review of Systems   Constitutional: Positive for fatigue. Negative for chills, diaphoresis and fever. HENT: Negative for congestion, sinus pressure and sore throat. Eyes: Negative for visual disturbance. Respiratory: Positive for shortness of breath. Negative for cough. Cardiovascular: Negative for chest pain and leg swelling. Gastrointestinal: Negative for diarrhea, nausea and vomiting. Genitourinary: Negative for dysuria, menstrual problem, urgency and vaginal discharge. Musculoskeletal: Negative for arthralgias, back pain and myalgias. Skin: Negative for rash. Neurological: Positive for weakness. Negative for numbness and headaches. Psychiatric/Behavioral: Positive for sleep disturbance. Negative for confusion, decreased concentration, dysphoric mood and suicidal ideas. The patient is nervous/anxious.        :   /60   Pulse 112   Temp 96.5 °F (35.8 °C)   Ht 5' (1.524 m)   Wt 202 lb 9.6 oz (91.9 kg)   SpO2 98%   BMI 39.57 kg/m²     Physical Exam  Constitutional:       General: She is not in acute distress. Appearance: She is well-developed. She is not diaphoretic. HENT:      Head: Normocephalic and atraumatic. Mouth/Throat:      Pharynx: No oropharyngeal exudate. Eyes:      General: No scleral icterus. Neck:      Musculoskeletal: Neck supple. Vascular: No carotid bruit. Cardiovascular:      Heart sounds: No murmur. No friction rub. No gallop. Pulmonary:      Effort: No respiratory distress. Breath sounds: No wheezing or rales. Chest:      Chest wall: No tenderness. Lymphadenopathy:      Cervical: No cervical adenopathy. Skin:     Findings: No rash.    Neurological:      Mental Status: She is alert and oriented to person, place, and time. Cranial Nerves: No cranial nerve deficit. Psychiatric:         Mood and Affect: Mood is anxious. Mood is not elated. Affect is not blunt or tearful. Behavior: Behavior normal. Behavior is not agitated. Thought Content: Thought content normal. Thought content does not include suicidal ideation. Thought content does not include homicidal plan. Cognition and Memory: Cognition is not impaired. Judgment: Judgment normal.         Assessment:       Diagnosis Orders   1. Anxiety  LORazepam (ATIVAN) 0.5 MG tablet   2. Essential hypertension     3. Hyperlipidemia, unspecified hyperlipidemia type     4. Mild intermittent asthma with acute exacerbation           Plan:      Return in about 3 months (around 8/21/2020). No orders of the defined types were placed in this encounter. Orders Placed This Encounter   Medications    LORazepam (ATIVAN) 0.5 MG tablet     Sig: Take 1 tablet by mouth every 8 hours as needed for Anxiety for up to 60 days. Dispense:  60 tablet     Refill:  1       Short term ativan use ok'd. She agrees to pursue counciling.   Will follow her results thru care everywhere she is to have echo done thru [facility].

## 2020-05-27 ENCOUNTER — TELEPHONE (OUTPATIENT)
Dept: BEHAVIORAL/MENTAL HEALTH CLINIC | Age: 65
End: 2020-05-27

## 2020-06-02 ENCOUNTER — OFFICE VISIT (OUTPATIENT)
Dept: FAMILY MEDICINE CLINIC | Age: 65
End: 2020-06-02
Payer: MEDICARE

## 2020-06-02 VITALS
DIASTOLIC BLOOD PRESSURE: 80 MMHG | HEIGHT: 60 IN | TEMPERATURE: 97.2 F | RESPIRATION RATE: 17 BRPM | BODY MASS INDEX: 39.46 KG/M2 | WEIGHT: 201 LBS | HEART RATE: 121 BPM | SYSTOLIC BLOOD PRESSURE: 120 MMHG | OXYGEN SATURATION: 98 %

## 2020-06-02 PROCEDURE — 1123F ACP DISCUSS/DSCN MKR DOCD: CPT | Performed by: FAMILY MEDICINE

## 2020-06-02 PROCEDURE — 4040F PNEUMOC VAC/ADMIN/RCVD: CPT | Performed by: FAMILY MEDICINE

## 2020-06-02 PROCEDURE — 3017F COLORECTAL CA SCREEN DOC REV: CPT | Performed by: FAMILY MEDICINE

## 2020-06-02 PROCEDURE — G0438 PPPS, INITIAL VISIT: HCPCS | Performed by: FAMILY MEDICINE

## 2020-06-02 ASSESSMENT — PATIENT HEALTH QUESTIONNAIRE - PHQ9
SUM OF ALL RESPONSES TO PHQ QUESTIONS 1-9: 0
2. FEELING DOWN, DEPRESSED OR HOPELESS: 0
1. LITTLE INTEREST OR PLEASURE IN DOING THINGS: 0
SUM OF ALL RESPONSES TO PHQ QUESTIONS 1-9: 0
SUM OF ALL RESPONSES TO PHQ9 QUESTIONS 1 & 2: 0

## 2020-06-02 ASSESSMENT — LIFESTYLE VARIABLES: HOW OFTEN DO YOU HAVE A DRINK CONTAINING ALCOHOL: 0

## 2020-06-02 NOTE — PROGRESS NOTES
10/08/2018    Pneumococcal Conjugate 7-valent (Prevnar7) 10/25/2006    Pneumococcal Polysaccharide ([id number]) 2016    Tdap (Boostrix, Adacel) 2018        Health Maintenance   Topic Date Due    Shingles Vaccine (1 of 2) 2003    Annual Wellness Visit (AWV)  2019    Breast cancer screen  07/10/2020    Potassium monitoring  2021    Creatinine monitoring  2021    Pneumococcal 65+ years Vaccine (2 of 2 - PPSV23) 2021    Lipid screen  2025    Colon cancer screen colonoscopy  02/10/2026    DTaP/Tdap/Td vaccine (2 - Td) 2028    DEXA (modify frequency per FRAX score)  Completed    Flu vaccine  Completed    Hepatitis C screen  Completed    Hepatitis A vaccine  Aged Out    Hepatitis B vaccine  Aged Out    Hib vaccine  Aged Out    Meningococcal (ACWY) vaccine  Aged Out     Recommendations for PeptiVir Due: see orders and patient instructions/AVS.  . Recommended screening schedule for the next 5-10 years is provided to the patient in written form: see Patient Instructions/AVS.    There are no diagnoses linked to this encounter. Medicare Annual Wellness Visit  Name: Modesta Paul Date: 2020   MRN: [id number] Sex: Female   Age: 77 y.o. Ethnicity: Non-/Non    : 1955 Race: Loyd Scott is here for Medicare AWV    Screenings for behavioral, psychosocial and functional/safety risks, and cognitive dysfunction are all negative except as indicated below. These results, as well as other patient data from the [address] form, are documented in Flowsheets linked to this Encounter. Allergies   Allergen Reactions    Daypro [Oxaprozin]     Tetracyclines & Related     Erythromycin Rash       Prior to Visit Medications    Medication Sig Taking?  Authorizing Provider   BREO ELLIPTA 200-25 MCG/INH AEPB inhaler inhale 1 puff by mouth and INTO THE LUNGS once daily Yes Historical Provider,

## 2020-06-19 ENCOUNTER — OFFICE VISIT (OUTPATIENT)
Dept: BEHAVIORAL/MENTAL HEALTH CLINIC | Age: 65
End: 2020-06-19
Payer: MEDICARE

## 2020-06-19 PROBLEM — F43.22 ADJUSTMENT DISORDER WITH ANXIETY: Status: ACTIVE | Noted: 2020-06-19

## 2020-06-19 PROCEDURE — 1036F TOBACCO NON-USER: CPT | Performed by: PSYCHOLOGIST

## 2020-06-19 PROCEDURE — 90791 PSYCH DIAGNOSTIC EVALUATION: CPT | Performed by: PSYCHOLOGIST

## 2020-06-19 NOTE — PROGRESS NOTES
Kenneth Melendez,  Ph.D.   Licensed Clinical Psychologist ([phone])    Visit Date: 6/19/2020   Time of appointment:  9:14a - 10:30a    Time spent with Patient: 76 minutes. This is patient's first appointment. Reason for Consult:  Anxiety and Stress     Referring Provider/PCP:    No ref. provider found  Hilton Rodriguez MD      Pt provided informed consent for the behavioral health program. Discussed with patient model of service to include the limits of confidentiality (i.e. abuse reporting, suicide intervention, etc.) and short-term intervention focused approach. Pt indicated understanding. PRESENTING PROBLEM AND HISTORY  Perlita Villalobos is a 77 y.o. female who presents for new evaluation and treatment of anxiety. She has the following symptoms: occasional dysthymic mood, decreased appetite, feeling nervous, anxious, or on edge, racing worry thoughts and constantly on guard, watchful, easily startled. Onset of symptoms was approximately 4-5 months ago. Symptoms have been gradually improving since that time. 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 She reported that she was eventually diagnosed with pneumonia on 2/21/20. She stated that she was worried about also having COVID-19, but she was tested twice and both were negative. She reported that none of the treatments for the pneumonia were working and she ended up in the ER on 4/27/20. She stated that she was hospitalized for 5 days and was in isolation for the first couple days. She reported that she was referred to a pulmonologist while in the hospital (Dr. Tawnya Jaimes) and was started on some new medications, including an inhaler.   She reported that the pulmonologist's nurse practitioner (NP) confirmed that while the tests did show there was still \"something in the lungs,\" she was concerned about Nerissa's level of anxiety making her breathing problems worse and she was prescribed Ativan. She reported that the NP suggested she consider counseling and to talk with her PCP about continuing the Ativan. She reported that over the past several weeks, her breathing has improved somewhat, especially with the inhaler, but she continues to struggle with anxiety when she has trouble breathing and also due to fears about the pandemic given her respiratory problems. Oriana Montejo reported that she is normally very active and busy with work, [employer], and volunteer/activist work. She stated that she is also a very independent person, but struggling with her breathing and being sick made her very scared and \"clingy\" with her , which is extremely out of character for her. She stated, \"my health has just really thrown me for a loop. \"  She reported that not being able to take care of herself or engage in her usual activities, especially due to the pandemic, made her feel even more distressed. She reported that she does not have the best relationship with her , as she described him as a very negative person and someone who is not emotionally, and at times not physically, supportive. She stated that her  did not react well to her being sick and having to take care of her and that he displayed no empathy, which made struggling with her illness these past few months even more difficult. She reported that her shayne and strong support system outside her  has always helped her to cope with how he is as a , but his lack of support during these past few months only highlighted how much she is unable to rely on him, which has been very upsetting on top of dealing with her health issues and anxiety. Prior to this current episode of anxiety symptoms, Oriana Montejo reported that she has only struggled with mood one other time in her life, around 2001.   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 She reported that during this time, her PCP tried her on an antidepressant but this made her feel significantly worse, including experiencing suicidal ideation, so she discontinued this medication right away and refused to be tried on anything else. She reported that she did not really do any counseling at that time, but stated that attending Muslim regularly and engaging in prayer was the most helpful. She stated, \"my shayne is what really pulled me out of all that. \"               She denies current suicidal and homicidal ideation. Family history significant for alcoholism and anxiety. Risk factors: negative life event (loss of parents, marital problems). Previous treatment includes Ativan and antidepressant (pt could not recall name). She complains of the following medication side effects: suicidal ideation with the antidepressant (2001). MENTAL STATUS EXAM  Mood was anxious with congruent affect. Suicidal ideation was denied. Homicidal ideation was denied. Hygiene was good . Dress was appropriate. Behavior was Within Normal Limits with Yes observation or self-report of difficulties ambulating. Attitude was Cooperative. Eye-contact was good. Speech: rate - WNL, rhythm -  WNL, volume - WNL  Verbalizations were goal directed and coherent. Thought processes were intact and goal-oriented without evidence of delusions, hallucinations, obsessions, or caleb; with no cognitive distortions. Associations were characterized by intact cognitive processes. Pt was orientated oriented to person, place, time, and general circumstances;  recent:  good. Insight and judgment were estimated to be good, AEB, a good  understanding of cyclical maladaptive patterns, and the ability to use insight to inform behavior change. CURRENT MEDICATIONS    Current Outpatient Medications:     BREO ELLIPTA 200-25 MCG/INH AEPB inhaler, inhale 1 puff by mouth and INTO THE LUNGS once daily, Disp: , Rfl:     LORazepam (ATIVAN) 0.5 MG tablet, Take 1 tablet by mouth every 8 hours as needed for Anxiety for up to 60 days. , Disp: 60 tablet, Rfl: 1    omeprazole (PRILOSEC) 20 MG delayed release capsule, Take 1 capsule by mouth Daily, Disp: 90 capsule, Rfl: 3    vitamin C (ASCORBIC ACID) 500 MG tablet, Take 1,000 mg by mouth daily, Disp: , Rfl:     vitamin B-12 (CYANOCOBALAMIN) 100 MCG tablet, Take 50 mcg by mouth daily, Disp: , Rfl:     albuterol (PROVENTIL) (2.5 MG/3ML) 0.083% nebulizer solution, Take 3 mLs by nebulization every 6 hours as needed for Wheezing or Shortness of Breath, Disp: 120 vial, Rfl: 2    potassium chloride (MICRO-K) 10 MEQ extended release capsule, TAKE ONE CAPSULE BY MOUTH TWICE DAILY, Disp: 180 capsule, Rfl: 10    albuterol sulfate HFA (PROVENTIL HFA) 108 (90 Base) MCG/ACT inhaler, Inhale 2 puffs into the lungs every 6 hours as needed for Wheezing, Disp: 3 Inhaler, Rfl: 0    hydrochlorothiazide (HYDRODIURIL) 50 MG tablet, Take 1 tablet by mouth daily, Disp: 90 tablet, Rfl: 3    lisinopril (PRINIVIL;ZESTRIL) 5 MG tablet, Take 1 tablet by mouth daily, Disp: 90 tablet, Rfl: 3     FAMILY MEDICAL/MH HISTORY   Her family history is not on file. In regards to family mental health history, she reported that her son has a possible history of ADHD. She stated that he also has a history of alcohol abuse. She reported that she was told by her sister that their mother took Ativan to treat anxiety related to dementia. PATIENT MENTAL HEALTH HISTORY  Pt reported that several years ago when her son was 25yo, she, her , and their son did some counseling for a brief time, but they did not find it helpful.   She reported that she has also attended [facility] to learn ways to cope with her son's alcohol problems, but she has not attended a meeting in the past 6-8 months. She has never done any individual therapy or seen a psychiatrist.  She reported that around 2001, her PCP tried her on an antidepressant medication (could not recall name of med). She reported that she did not stay on this medication long as it made her feel worse, including suicidal ideation, which she had never experienced before or since stopping the medication. She stated that this experience has made her never want to be tried on an antidepressant again. Recently, she reported that she was prescribed Ativan to help with anxiety related to her breathing problems, which has been helpful. She reported that this is currently being managed by her PCP and that she has only been taking it as needed. She denied any history of psychiatric hospitalization or suicide attempt. She denied any history of suicidal ideation, except during the time she was tried on antidepressant medication in 2001. PSYCHOSOCIAL HISTORY  Current living situation:  Pt lives with her  of 44 years in a home they have owned since 2001 [facility][1]. They live alone and have no pets. They have 2 children, a 46yo son and 36 yo daughter. Work/Education:   Pt has a Masters in early childhood education/psychology. She worked for 28 years with [employer], 25 years as a  and 10 years in other positions. She is currently semi-retired. 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 Support system:   Pt reported that her daughter is her primary source of support, describing her daughter as her \"rock\" and \"the one that grounds me. \"  She stated that her daughter was the one who helped her the most with the grief she experienced after her own parents passed away.   She reported that she has 2 sisters, in [place] and [place], [place], with whom she speaks every day, are good sources of support. She reported that she also has a best friend in addition to a very close friend who lives in town who has been helpful when she was sick over the past few months. Roman Catholic/Spirituality:  Pt reported that she is [employer] and has a very strong shayne. She stated that not being able to go to [facility] regularly due to the pandemic has been hard for her, as she typically attends services daily. She reported that her shayne is a main source of strength for and provides significant \"comfort and peace\" in her life. DRUG AND ALCOHOL CURRENT USE/HISTORY  TOBACCO:  She reports that she has never smoked. She has never used smokeless tobacco.  ALCOHOL:  She reports current alcohol use. OTHER SUBSTANCES: She reports no history of drug use. ASSESSMENT  Alice Szymanski presented to the appointment today for evaluation and treatment of symptoms of anxiety. She is currently deemed no risk to herself or others and meets criteria for Adjustment Disorder with Anxiety. She is currently being treated with Ativan as needed by her PCP. Nerissa's symptoms are well controlled at this time. She will benefit from brief and solution-focused consultation to address cognitive and behavioral interventions for anxiety symptoms. Lorna Humphrey was in agreement with recommendations. PHQ Scores 6/2/2020 2/21/2020 7/18/2019 10/8/2018 9/6/2017   PHQ2 Score 0 1 0 0 0   PHQ9 Score 0 1 0 0 0     Interpretation of Total Score Depression Severity: 1-4 = Minimal depression, 5-9 = Mild depression, 10-14 = Moderate depression, 15-19 = Moderately severe depression, 20-27 = Severe depression    How often pt has had thoughts of death or hurting self (if PHQ positive for depression):       No flowsheet data found. Interpretation of TOD-7 score: 5-9 = mild anxiety, 10-14 = moderate anxiety, 15+ = severe anxiety. Recommend referral to behavioral health for scores 10 or greater.       DIAGNOSIS  Lorna Humphrey was seen today for anxiety and stress. Diagnoses and all orders for this visit:    Adjustment disorder with anxiety          INTERVENTION  Trained in relaxation strategies, Discussed self-care (sleep, nutrition, rewarding activities, social support, exercise), Established rapport, Supportive techniques, Emphasized self-care as important for managing overall health and Explained relaxed breathing technique in detail and practiced this with pt in visit. Also suggested YouTube video link to guide in practicing deep breathing exercise. PLAN  1)  Pt will engage in self-care activities, including practicing deep breathing and other relaxation activities. 2)  Pt will return for a follow-up office visit with the [facility] in 3 weeks on 7/10/20 at [address] [facility]  Is interactive complexity present?   No  Reason:  N/A  Additional Supporting Information:  N/A       Electronically signed by Nam Longo, PhD on 6/19/20 at 9:06 AM EDT

## 2020-07-10 ENCOUNTER — OFFICE VISIT (OUTPATIENT)
Dept: BEHAVIORAL/MENTAL HEALTH CLINIC | Age: 65
End: 2020-07-10
Payer: MEDICARE

## 2020-07-10 PROCEDURE — 1036F TOBACCO NON-USER: CPT | Performed by: PSYCHOLOGIST

## 2020-07-10 PROCEDURE — 90837 PSYTX W PT 60 MINUTES: CPT | Performed by: PSYCHOLOGIST

## 2020-07-10 NOTE — PROGRESS NOTES
Scott Castro,  Ph.D.   Licensed Clinical Psychologist ([phone])    Visit Date: 7/10/2020   Time of appointment:  10:18a - 11:19a   Time spent with Patient: 61 minutes. This is patient's second appointment. Reason for Consult:  Anxiety and Stress     Referring Provider/PCP:    No ref. provider found  Luther Meyer MD      Pt provided informed consent for the behavioral health program. Discussed with patient model of service to include the limits of confidentiality (i.e. abuse reporting, suicide intervention, etc.) and short-term intervention focused approach. Pt indicated understanding. Ochoa Smallwood is a 77 y.o. female who presents for follow up of anxiety. She reported that her anxiety has lessened over the past few weeks, especially since her breathing has improved. She reported that she recently saw her pulmonologist and recent CT scans show that her lungs are now clear of any pneumonia. She reported that being treated with prednisone for a couple weeks \"really turned the page\" in regards to her recent breathing problems and the inhaler has continued to help her breathing. She reported that she has been practicing the deep breathing exercises she learned at the last appointment and watched the YouTube video suggested to her. She reported that she found other helpful breathing and relaxation videos on YouTube to help her with meditation and calming herself down when she feels anxious. She reported that she also decided to take off her FitBit, as she found that she was excessively monitoring her heart rate, which would make her feel more anxious. She reported that not having her FitBit on her all the time has also been very helpful. She reported that in the past few weeks, it's been her 's negative attitude that has been causing her the most distress and she is wanting to learn ways to cope with him more effectively.          Previous marriage, but additional ways to cope with her  along with psychoeducation on enduring personality traits seemed to be beneficial.  Pt was in agreement with all recommendations made during the session. PHQ Scores 6/2/2020 2/21/2020 7/18/2019 10/8/2018 9/6/2017   PHQ2 Score 0 1 0 0 0   PHQ9 Score 0 1 0 0 0     Interpretation of Total Score Depression Severity: 1-4 = Minimal depression, 5-9 = Mild depression, 10-14 = Moderate depression, 15-19 = Moderately severe depression, 20-27 = Severe depression    How often pt has had thoughts of death or hurting self (if PHQ positive for depression):       No flowsheet data found. Interpretation of TOD-7 score: 5-9 = mild anxiety, 10-14 = moderate anxiety, 15+ = severe anxiety. Recommend referral to behavioral health for scores 10 or greater. DIAGNOSIS  Elva Quick was seen today for anxiety and stress. Diagnoses and all orders for this visit:    Adjustment disorder with anxiety          INTERVENTION  Trained in relaxation strategies, Discussed self-care (sleep, nutrition, rewarding activities, social support, exercise), Supportive techniques, Emphasized self-care as important for managing overall health and Provided Psychoeducation re: relationship issues and personality traits. PLAN  1)  Pt will continue to engage in self-care activities, including practicing relaxation and deep breathing exercises as well as interpersonal skills to better manage the relationship with her . 2)  Pt will return for a follow-up office visit with the [address] [place] [address] in 4 weeks on 8/7/20 at [address]  Is interactive complexity present?   No  Reason:  N/A  Additional Supporting Information:  N/A       Electronically signed by Bhupinder Diggs, PhD on 7/10/20 at 10:20 AM EDT

## 2020-07-14 RX ORDER — HYDROCHLOROTHIAZIDE 50 MG/1
50 TABLET ORAL DAILY
Qty: 90 TABLET | Refills: 3 | Status: SHIPPED | OUTPATIENT
Start: 2020-07-14 | End: 2021-01-28 | Stop reason: SDUPTHER

## 2020-07-14 RX ORDER — LISINOPRIL 5 MG/1
5 TABLET ORAL DAILY
Qty: 90 TABLET | Refills: 3 | Status: SHIPPED | OUTPATIENT
Start: 2020-07-14 | End: 2021-01-28 | Stop reason: SDUPTHER

## 2020-07-20 ENCOUNTER — TELEPHONE (OUTPATIENT)
Dept: FAMILY MEDICINE CLINIC | Age: 65
End: 2020-07-20

## 2020-08-07 ENCOUNTER — OFFICE VISIT (OUTPATIENT)
Dept: BEHAVIORAL/MENTAL HEALTH CLINIC | Age: 65
End: 2020-08-07
Payer: MEDICARE

## 2020-08-07 PROCEDURE — 90837 PSYTX W PT 60 MINUTES: CPT | Performed by: PSYCHOLOGIST

## 2020-08-07 PROCEDURE — 1036F TOBACCO NON-USER: CPT | Performed by: PSYCHOLOGIST

## 2020-08-07 NOTE — PROGRESS NOTES
which was effective at times and other times he responded negatively to her. She reported that she \"laments\" that her  is not capable of a deeper relationship with her that can leave her lonely at times, but that she has also learned that this is not her fault nor her responsibility to fix as she has done her best to reach out to him. Previous Recommendations:  1)  Pt will continue to engage in self-care activities, including practicing relaxation and deep breathing exercises as well as interpersonal skills to better manage the relationship with her . 2)  Pt will return for a follow-up office visit with the [facility] in 4 weeks on 8/7/20 at [facility] 54 was within normal limits with calm affect. Suicidal ideation was denied. Homicidal ideation was denied. Hygiene was good . Dress was appropriate. Behavior was Within Normal Limits with No observation or self-report of difficulties ambulating. Attitude was Cooperative. Eye-contact was good. Speech: rate - WNL, rhythm - WNL, volume - WNL. Verbalizations were goal directed and coherent. Thought processes were intact and goal-oriented without evidence of delusions, hallucinations, obsessions, or caleb; with no cognitive distortions. Associations were characterized by intact cognitive processes. Pt was orientated oriented to person, place, time, and general circumstances;  recent:  good. Insight and judgment were estimated to be good, AEB, a good  understanding of cyclical maladaptive patterns, and the ability to use insight to inform behavior change. ASSESSMENT  Pt presented today for the treatment of anxiety symptoms. She remains at no risk of harm to self or others and continues to meet criteria for Adjustment Disorder with Anxiety. She continues to make good progress in managing her anxiety and her breathing continues to improve.   She has also been managing stress related to her  relatively well.  She was engaged in practicing more adaptive coping talk when feeling anxious with her breathing or when dealing with her . Some techniques to communicate more effectively with her  were also modeled. Pt was in agreement with recommendations made during session. PHQ Scores 6/2/2020 2/21/2020 7/18/2019 10/8/2018 9/6/2017   PHQ2 Score 0 1 0 0 0   PHQ9 Score 0 1 0 0 0     Interpretation of Total Score Depression Severity: 1-4 = Minimal depression, 5-9 = Mild depression, 10-14 = Moderate depression, 15-19 = Moderately severe depression, 20-27 = Severe depression    How often pt has had thoughts of death or hurting self (if PHQ positive for depression):       No flowsheet data found. Interpretation of TOD-7 score: 5-9 = mild anxiety, 10-14 = moderate anxiety, 15+ = severe anxiety. Recommend referral to behavioral health for scores 10 or greater. DIAGNOSIS  Christopher Mercado was seen today for anxiety and stress. Diagnoses and all orders for this visit:    Adjustment disorder with anxiety          INTERVENTION  Trained in strategies for increasing balanced thinking, Trained in relaxation strategies, Trained in improving communication skills, Discussed self-care (sleep, nutrition, rewarding activities, social support, exercise), Supportive techniques and Emphasized self-care as important for managing overall health      PLAN  1)  Pt will continue to engage in self-care activities, including practicing relaxation and deep breathing exercises and safely engaging in enjoyable activities in and outside of the house. 2)  Pt will continue to practice interpersonal and coping skills with her . 3)  Pt will return for a follow-up office visit with the [facility] in 4 weeks on 9/4/20 at [address] [facility]  Is interactive complexity present?   No  Reason:  N/A  Additional Supporting Information:  N/A       Electronically signed by Ej Jackson, PhD on 8/7/20 at 10:08 AM EDT

## 2020-09-03 ENCOUNTER — OFFICE VISIT (OUTPATIENT)
Dept: FAMILY MEDICINE CLINIC | Age: 65
End: 2020-09-03
Payer: MEDICARE

## 2020-09-03 VITALS
WEIGHT: 205.8 LBS | HEART RATE: 91 BPM | OXYGEN SATURATION: 97 % | TEMPERATURE: 97.7 F | SYSTOLIC BLOOD PRESSURE: 116 MMHG | BODY MASS INDEX: 40.4 KG/M2 | DIASTOLIC BLOOD PRESSURE: 80 MMHG | HEIGHT: 60 IN

## 2020-09-03 PROBLEM — Z91.09 OTHER ALLERGY STATUS, OTHER THAN TO DRUGS AND BIOLOGICAL SUBSTANCES: Status: ACTIVE | Noted: 2020-09-03

## 2020-09-03 LAB
ALBUMIN SERPL-MCNC: 4 G/DL
ALP BLD-CCNC: 102 U/L
ALT SERPL-CCNC: 24 U/L
ANION GAP SERPL CALCULATED.3IONS-SCNC: 10 MMOL/L
AST SERPL-CCNC: 25 U/L
BASOPHILS ABSOLUTE: 0 /ΜL
BASOPHILS RELATIVE PERCENT: 0.6 %
BILIRUB SERPL-MCNC: 1 MG/DL (ref 0.1–1.4)
BUN BLDV-MCNC: 12 MG/DL
CALCIUM SERPL-MCNC: 9.3 MG/DL
CHLORIDE BLD-SCNC: 103 MMOL/L
CHOLESTEROL, TOTAL: 193 MG/DL
CHOLESTEROL/HDL RATIO: 3.4
CO2: 27 MMOL/L
CREAT SERPL-MCNC: 0.85 MG/DL
EOSINOPHILS ABSOLUTE: 0.2 /ΜL
EOSINOPHILS RELATIVE PERCENT: 2.4 %
GFR CALCULATED: 60
GLUCOSE BLD-MCNC: 83 MG/DL
HCT VFR BLD CALC: 37.8 % (ref 36–46)
HDLC SERPL-MCNC: 56 MG/DL (ref 35–70)
HEMOGLOBIN: 12.9 G/DL (ref 12–16)
LDL CHOLESTEROL CALCULATED: 115 MG/DL (ref 0–160)
LYMPHOCYTES ABSOLUTE: 1.9 /ΜL
LYMPHOCYTES RELATIVE PERCENT: 30.7 %
MCH RBC QN AUTO: 28.8 PG
MCHC RBC AUTO-ENTMCNC: 34.1 G/DL
MCV RBC AUTO: 84 FL
MONOCYTES ABSOLUTE: 0.5 /ΜL
MONOCYTES RELATIVE PERCENT: 8.4 %
NEUTROPHILS ABSOLUTE: 3.6 /ΜL
NEUTROPHILS RELATIVE PERCENT: 57.9 %
NONHDLC SERPL-MCNC: NORMAL MG/DL
PDW BLD-RTO: 14 %
PLATELET # BLD: 250 K/ΜL
PMV BLD AUTO: 9.3 FL
POTASSIUM SERPL-SCNC: 4 MMOL/L
RBC # BLD: 4.47 10^6/ΜL
SODIUM BLD-SCNC: 140 MMOL/L
TOTAL PROTEIN: 7.1
TRIGL SERPL-MCNC: 111 MG/DL
VLDLC SERPL CALC-MCNC: 22 MG/DL
WBC # BLD: 6.2 10^3/ML

## 2020-09-03 PROCEDURE — G0008 ADMIN INFLUENZA VIRUS VAC: HCPCS | Performed by: FAMILY MEDICINE

## 2020-09-03 PROCEDURE — 3017F COLORECTAL CA SCREEN DOC REV: CPT | Performed by: FAMILY MEDICINE

## 2020-09-03 PROCEDURE — 1123F ACP DISCUSS/DSCN MKR DOCD: CPT | Performed by: FAMILY MEDICINE

## 2020-09-03 PROCEDURE — 90694 VACC AIIV4 NO PRSRV 0.5ML IM: CPT | Performed by: FAMILY MEDICINE

## 2020-09-03 PROCEDURE — 4040F PNEUMOC VAC/ADMIN/RCVD: CPT | Performed by: FAMILY MEDICINE

## 2020-09-03 PROCEDURE — G0439 PPPS, SUBSEQ VISIT: HCPCS | Performed by: FAMILY MEDICINE

## 2020-09-03 RX ORDER — PANTOPRAZOLE SODIUM 40 MG/10ML
40 INJECTION, POWDER, LYOPHILIZED, FOR SOLUTION INTRAVENOUS DAILY
COMMUNITY
End: 2020-09-03 | Stop reason: ALTCHOICE

## 2020-09-03 RX ORDER — PANTOPRAZOLE SODIUM 40 MG/1
40 TABLET, DELAYED RELEASE ORAL
Qty: 30 TABLET | Refills: 5
Start: 2020-09-03 | End: 2021-01-28 | Stop reason: SDUPTHER

## 2020-09-03 ASSESSMENT — PATIENT HEALTH QUESTIONNAIRE - PHQ9
SUM OF ALL RESPONSES TO PHQ QUESTIONS 1-9: 0
SUM OF ALL RESPONSES TO PHQ QUESTIONS 1-9: 0

## 2020-09-03 ASSESSMENT — LIFESTYLE VARIABLES: HOW OFTEN DO YOU HAVE A DRINK CONTAINING ALCOHOL: 0

## 2020-09-03 NOTE — PATIENT INSTRUCTIONS
Personalized Preventive Plan for Blease Goldberg - 9/3/2020  Medicare offers a range of preventive health benefits. Some of the tests and screenings are paid in full while other may be subject to a deductible, co-insurance, and/or copay. Some of these benefits include a comprehensive review of your medical history including lifestyle, illnesses that may run in your family, and various assessments and screenings as appropriate. After reviewing your medical record and screening and assessments performed today your provider may have ordered immunizations, labs, imaging, and/or referrals for you. A list of these orders (if applicable) as well as your Preventive Care list are included within your After Visit Summary for your review. Other Preventive Recommendations:    · A preventive eye exam performed by an eye specialist is recommended every 1-2 years to screen for glaucoma; cataracts, macular degeneration, and other eye disorders. · A preventive dental visit is recommended every 6 months. · Try to get at least 150 minutes of exercise per week or 10,000 steps per day on a pedometer . · Order or download the FREE \"Exercise & Physical Activity: Your Everyday Guide\" from [employer]. Call 2-247.366.2099 or search [employer] online. · You need 6492-8771 mg of calcium and 3745-1820 IU of vitamin D per day. It is possible to meet your calcium requirement with diet alone, but a vitamin D supplement is usually necessary to meet this goal.  · When exposed to the sun, use a sunscreen that protects against both UVA and UVB radiation with an SPF of 30 or greater. Reapply every 2 to 3 hours or after sweating, drying off with a towel, or swimming. · Always wear a seat belt when traveling in a car. Always wear a helmet when riding a bicycle or motorcycle.

## 2020-09-03 NOTE — PROGRESS NOTES
providers/suppliers regularly involved in providing care):   Patient Care Team:  Rasheeda Phillips MD as PCP - General (Family Medicine)  Rasheeda Phillips MD as PCP - [facility]  [place] (Ophthalmology)  Lucie Cobos MD as Consulting Physician (Rheumatology)  Heather Dawkins MD as Consulting Physician (Gastroenterology)    Wt Readings from Last 3 Encounters:   09/03/20 205 lb 12.8 oz (93.4 kg)   06/02/20 201 lb (91.2 kg)   05/21/20 202 lb 9.6 oz (91.9 kg)     Vitals:    09/03/20 1511   BP: 116/80   Pulse: 91   Temp: 97.7 °F (36.5 °C)   SpO2: 97%   Weight: 205 lb 12.8 oz (93.4 kg)   Height: 5' (1.524 m)     Body mass index is 40.19 kg/m². Based upon direct observation of the patient, evaluation of cognition reveals recent and remote memory intact. Head: normocephalic and atraumatic  ENT: tympanic membrane, external ear and ear canal normal bilaterally, oropharynx clear and moist with normal mucous membranes  Neck: neck supple and non tender without mass and no cervical adenopathy   Pulmonary/Chest: clear to auscultation bilaterally- no wheezes, rales or rhonchi, normal air movement, no respiratory distress and no chest wall tenderness  Cardiovascular: normal rate, normal S1 and S2, no gallops, intact distal pulses and no carotid bruits  Abdomen: soft, non-tender and non-distended  Extremities: no cyanosis, clubbing or edema    Patient's complete Health Risk Assessment and screening values have been reviewed and are found in Flowsheets. The following problems were reviewed today and where indicated follow up appointments were made and/or referrals ordered. Positive Risk Factor Screenings with Interventions:     General Health:  General  In general, how would you say your health is?: Fair  In the past 7 days, have you experienced any of the following?  New or Increased Pain, New or Increased Fatigue, Loneliness, Social Isolation, Stress or Anger?: None of These  Do you get the social and emotional support that you need?: Yes  Do you have a Living Will?: (!) No  General Health Risk Interventions:  · improved breathing and improved exercise tolerance    Health Habits/Nutrition:  Health Habits/Nutrition  Do you exercise for at least 20 minutes 2-3 times per week?: Yes  Have you lost any weight without trying in the past 3 months?: (!) Yes  Do you eat fewer than 2 meals per day?: No  Have you seen a dentist within the past year?: Yes  Body mass index is 40.19 kg/m².   Health Habits/Nutrition Interventions:  · no issues raised    Hearing/Vision:  No exam data present  Hearing/Vision  Do you or your family notice any trouble with your hearing?: No  Do you have difficulty driving, watching TV, or doing any of your daily activities because of your eyesight?: No  Have you had an eye exam within the past year?: (!) No  Hearing/Vision Interventions:  · no concerns    Safety:  Safety  Do you have working smoke detectors?: Yes  Have all throw rugs been removed or fastened?: (!) No  Do you have non-slip mats or surfaces in all bathtubs/showers?: Yes  Do all of your stairways have a railing or banister?: Yes  Are your doorways, halls and stairs free of clutter?: Yes  Do you always fasten your seatbelt when you are in a car?: Yes  Safety Interventions:  · Home safety tips provided    Personalized Preventive Plan   Current Health Maintenance Status  Immunization History   Administered Date(s) Administered    Hepatitis A Adult (Vaqta) 07/11/2018    Hepatitis B 10/01/2018    Hepatitis B Adult (Engerix-B) 10/01/2018    Influenza 10/12/2012    Influenza A (A2D3-60) Vaccine PF IM 11/02/2009    Influenza Nasal 10/14/2010    Influenza Virus Vaccine 09/26/2014, 10/16/2015, 10/18/2016, 09/27/2017, 10/01/2018    Influenza, [place], IM, (6 mo and older Fluzone, Flulaval, Fluarix and 3 yrs and older Afluria) 10/18/2016, 09/27/2017, 10/01/2018    Influenza, Quadv, adjuvanted, 65 yrs +, IM, PF (Fluad) 09/03/2020    2021 as she had her first in 2016 (pre 70 th birthday).

## 2020-09-04 ENCOUNTER — OFFICE VISIT (OUTPATIENT)
Dept: BEHAVIORAL/MENTAL HEALTH CLINIC | Age: 65
End: 2020-09-04
Payer: MEDICARE

## 2020-09-04 PROCEDURE — 90837 PSYTX W PT 60 MINUTES: CPT | Performed by: PSYCHOLOGIST

## 2020-09-04 PROCEDURE — 1036F TOBACCO NON-USER: CPT | Performed by: PSYCHOLOGIST

## 2020-09-04 NOTE — PROGRESS NOTES
changed, she feels much better able to tolerate him and not as distressed by the way that he is over the past month. Previous Recommendations:   1)  Pt will continue to engage in self-care activities, including practicing relaxation and deep breathing exercises and safely engaging in enjoyable activities in and outside of the house. 2)  Pt will continue to practice interpersonal and coping skills with her . 3)  Pt will return for a follow-up office visit with the [facility] in 4 weeks on 9/4/20 at [facility] was within normal limits with calm affect. Suicidal ideation was denied. Homicidal ideation was denied. Hygiene was good . Dress was appropriate. Behavior was Within Normal Limits with No observation or self-report of difficulties ambulating. Attitude was Cooperative. Eye-contact was good. Speech: rate - WNL, rhythm - WNL, volume - WNL. Verbalizations were goal directed and coherent. Thought processes were intact and goal-oriented without evidence of delusions, hallucinations, obsessions, or caleb; with no cognitive distortions. Associations were characterized by intact cognitive processes. Pt was orientated oriented to person, place, time, and general circumstances;  recent:  good. Insight and judgment were estimated to be good, AEB, a good  understanding of cyclical maladaptive patterns, and the ability to use insight to inform behavior change. ASSESSMENT  Lon Vicente presented to the appointment today for evaluation and treatment of symptoms of anxiety. She is currently deemed no risk to herself or others and meets criteria for Adjustment Disorder with Anxiety. She continues to make good progress and her anxiety has been much more manageable, especially as her breathing continues to improve. She has also been managing stress related to her  more effectively.   She was encouraged to continue practicing good self-care, coping talk, and safely engaging in physical and social activities. Anit was in agreement with recommendations. PHQ Scores 9/3/2020 6/2/2020 2/21/2020 7/18/2019 10/8/2018 9/6/2017   PHQ2 Score 0 0 1 0 0 0   PHQ9 Score 0 0 1 0 0 0     Interpretation of Total Score Depression Severity: 1-4 = Minimal depression, 5-9 = Mild depression, 10-14 = Moderate depression, 15-19 = Moderately severe depression, 20-27 = Severe depression    How often pt has had thoughts of death or hurting self (if PHQ positive for depression):       No flowsheet data found. Interpretation of TOD-7 score: 5-9 = mild anxiety, 10-14 = moderate anxiety, 15+ = severe anxiety. Recommend referral to behavioral health for scores 10 or greater. DIAGNOSIS  Kaesy Mccarty was seen today for anxiety. Diagnoses and all orders for this visit:    Adjustment disorder with anxiety          INTERVENTION  Trained in strategies for increasing balanced thinking, Discussed self-care (sleep, nutrition, rewarding activities, social support, exercise), Supportive techniques, Emphasized self-care as important for managing overall health and Problem-solving re: planning a safe vacation given the pandemic. PLAN  1)  Pt will continue to engage in self-care activities, including practicing relaxation and deep breathing exercises and safely engaging in enjoyable activities in and outside of the house. 2)  Pt will continue to practice interpersonal and coping skills with her . 3)  Pt will return for a follow-up office visit with the [facility] in 4 weeks on 10/2/20 at [address]  Is interactive complexity present?   No  Reason:  N/A  Additional Supporting Information:  N/A       Electronically signed by Giuseppe Fuentes, PhD on 9/4/20 at 10:18 AM EDT

## 2020-10-02 ENCOUNTER — OFFICE VISIT (OUTPATIENT)
Dept: BEHAVIORAL/MENTAL HEALTH CLINIC | Age: 65
End: 2020-10-02
Payer: MEDICARE

## 2020-10-02 PROCEDURE — 90837 PSYTX W PT 60 MINUTES: CPT | Performed by: PSYCHOLOGIST

## 2020-10-02 PROCEDURE — 1036F TOBACCO NON-USER: CPT | Performed by: PSYCHOLOGIST

## 2020-10-02 NOTE — PROGRESS NOTES
and outside of the house. 2)  Pt will continue to practice interpersonal and coping skills with her . 3)  Pt will return for a follow-up office visit with the [facility] in 4 weeks on 10/2/20 at [address] was anxious and sad with calm affect, but tearful at times when discussing her . Suicidal ideation was denied. Homicidal ideation was denied. Hygiene was good . Dress was appropriate. Behavior was Within Normal Limits with No observation or self-report of difficulties ambulating. Attitude was Cooperative. Eye-contact was good. Speech: rate - WNL, rhythm - WNL, volume - WNL. Verbalizations were goal directed and coherent. Thought processes were intact and goal-oriented without evidence of delusions, hallucinations, obsessions, or caleb; with no cognitive distortions. Associations were characterized by intact cognitive processes. Pt was orientated oriented to person, place, time, and general circumstances;  recent:  good. Insight and judgment were estimated to be good, AEB, a good  understanding of cyclical maladaptive patterns, and the ability to use insight to inform behavior change. ASSESSMENT  Wily First presented to the appointment today for evaluation and treatment of symptoms of anxiety. She is currently deemed no risk to herself or others and meets criteria for Adjustment Disorder with anxiety. Anxiety related to breathing continues to decrease and has been minimal.  She was encouraged to improve her physical activity level, especially as winter approaches, and ways to do this were discussed, including using her home treadmill. Feelings related to her  were validated and she was encouraged to continue practicing good self-care, coping talk, and safely engaging in physical and social activities. Jimbo Mayers was in agreement with recommendations.     PHQ Scores 9/3/2020 6/2/2020 2/21/2020 7/18/2019 10/8/2018 9/6/2017   PHQ2 Score 0 0 1 0 0 0 PHQ9 Score 0 0 1 0 0 0     Interpretation of Total Score Depression Severity: 1-4 = Minimal depression, 5-9 = Mild depression, 10-14 = Moderate depression, 15-19 = Moderately severe depression, 20-27 = Severe depression    How often pt has had thoughts of death or hurting self (if PHQ positive for depression):       No flowsheet data found. Interpretation of TOD-7 score: 5-9 = mild anxiety, 10-14 = moderate anxiety, 15+ = severe anxiety. Recommend referral to behavioral health for scores 10 or greater. DIAGNOSIS  Kary Ferguson was seen today for anxiety and stress. Diagnoses and all orders for this visit:    Adjustment disorder with anxiety          INTERVENTION  Trained in strategies for increasing balanced thinking, Discussed self-care (sleep, nutrition, rewarding activities, social support, exercise), Supportive techniques, Emphasized self-care as important for managing overall health and Provided Psychoeducation re: practicing mindful acceptance      PLAN  1)  Pt will continue to engage in self-care activities, including practicing relaxation and deep breathing exercises and safely engaging in enjoyable activities in and outside of the house. 2)  Pt will continue to practice interpersonal and coping skills with her . 3)  Pt will return for a follow-up office visit with the [facility] in 5 weeks on 11/6/20 at [address] [facility]  Is interactive complexity present?   No  Reason:  N/A  Additional Supporting Information:  N/A       Electronically signed by Mejia Portillo, PhD on 10/2/20 at 11:15 AM EDT

## 2020-10-12 ENCOUNTER — TELEMEDICINE (OUTPATIENT)
Dept: FAMILY MEDICINE CLINIC | Age: 65
End: 2020-10-12
Payer: MEDICARE

## 2020-10-12 PROCEDURE — 1123F ACP DISCUSS/DSCN MKR DOCD: CPT | Performed by: FAMILY MEDICINE

## 2020-10-12 PROCEDURE — 4040F PNEUMOC VAC/ADMIN/RCVD: CPT | Performed by: FAMILY MEDICINE

## 2020-10-12 PROCEDURE — G8427 DOCREV CUR MEDS BY ELIG CLIN: HCPCS | Performed by: FAMILY MEDICINE

## 2020-10-12 PROCEDURE — 1090F PRES/ABSN URINE INCON ASSESS: CPT | Performed by: FAMILY MEDICINE

## 2020-10-12 PROCEDURE — 99213 OFFICE O/P EST LOW 20 MIN: CPT | Performed by: FAMILY MEDICINE

## 2020-10-12 PROCEDURE — 3017F COLORECTAL CA SCREEN DOC REV: CPT | Performed by: FAMILY MEDICINE

## 2020-10-12 PROCEDURE — G8400 PT W/DXA NO RESULTS DOC: HCPCS | Performed by: FAMILY MEDICINE

## 2020-10-12 ASSESSMENT — PATIENT HEALTH QUESTIONNAIRE - PHQ9
1. LITTLE INTEREST OR PLEASURE IN DOING THINGS: 0
SUM OF ALL RESPONSES TO PHQ9 QUESTIONS 1 & 2: 0
SUM OF ALL RESPONSES TO PHQ QUESTIONS 1-9: 0
SUM OF ALL RESPONSES TO PHQ QUESTIONS 1-9: 0
2. FEELING DOWN, DEPRESSED OR HOPELESS: 0

## 2020-10-12 ASSESSMENT — ENCOUNTER SYMPTOMS
COUGH: 1
VOMITING: 0
SHORTNESS OF BREATH: 0
SORE THROAT: 1
DIARRHEA: 0
VOICE CHANGE: 0
RHINORRHEA: 1
SINUS PRESSURE: 0
BACK PAIN: 0
NAUSEA: 0

## 2020-10-12 NOTE — PROGRESS NOTES
potassium chloride (MICRO-K) 10 MEQ extended release capsule TAKE ONE CAPSULE BY MOUTH TWICE DAILY  Macario Duque MD   albuterol sulfate HFA (PROVENTIL HFA) 108 (90 Base) MCG/ACT inhaler Inhale 2 puffs into the lungs every 6 hours as needed for Wheezing  Fox Barton MD       Social History     Tobacco Use    Smoking status: Never Smoker    Smokeless tobacco: Never Used   Substance Use Topics    Alcohol use: Yes     Comment: rare     Drug use: No        Past Medical History:   Diagnosis Date    Asthma     Hypertension     OA (osteoarthritis)     Obesity    ,   Past Surgical History:   Procedure Laterality Date    COLONOSCOPY      2006; 2016; due 2026   ,   Social History     Tobacco Use    Smoking status: Never Smoker    Smokeless tobacco: Never Used   Substance Use Topics    Alcohol use: Yes     Comment: rare     Drug use: No   , No family history on file. PHYSICAL EXAMINATION:  [ INSTRUCTIONS:  \"[x]\" Indicates a positive item  \"[]\" Indicates a negative item  -- DELETE ALL ITEMS NOT EXAMINED]  Vital Signs: (As obtained by patient/caregiver or practitioner observation)    Blood pressure-  Heart rate-    Respiratory rate-    Temperature-  Pulse oximetry-     Constitutional: [x] Appears well-developed and well-nourished [x] No apparent distress      [] Abnormal-   Mental status  [x] Alert and awake  [] Oriented to person/place/time []Able to follow commands      Eyes:  EOM    [x]  Normal  [] Abnormal-  Sclera  [x]  Normal  [] Abnormal -         Discharge []  None visible  [] Abnormal -    HENT:   [x] Normocephalic, atraumatic.   [] Abnormal   [x] Mouth/Throat: Mucous membranes are moist.     External Ears [x] Normal  [] Abnormal-     Neck: [x] No visualized mass     Pulmonary/Chest: [x] Respiratory effort normal.  [] No visualized signs of difficulty breathing or respiratory distress        [] Abnormal-      Musculoskeletal:   [] Normal gait with no signs of ataxia         [] Normal range of 10/12/2020 at 11:10 AM    An electronic signature was used to authenticate this note.

## 2020-10-14 ENCOUNTER — OFFICE VISIT (OUTPATIENT)
Dept: PRIMARY CARE CLINIC | Age: 65
End: 2020-10-14
Payer: MEDICARE

## 2020-10-14 ENCOUNTER — HOSPITAL ENCOUNTER (OUTPATIENT)
Age: 65
Setting detail: SPECIMEN
Discharge: HOME | End: 2020-10-14
Payer: MEDICARE

## 2020-10-14 VITALS
HEART RATE: 90 BPM | SYSTOLIC BLOOD PRESSURE: 137 MMHG | WEIGHT: 207 LBS | DIASTOLIC BLOOD PRESSURE: 69 MMHG | OXYGEN SATURATION: 97 % | HEIGHT: 60 IN | TEMPERATURE: 98.9 F | BODY MASS INDEX: 40.64 KG/M2

## 2020-10-14 PROCEDURE — 99202 OFFICE O/P NEW SF 15 MIN: CPT | Performed by: INTERNAL MEDICINE

## 2020-10-14 PROCEDURE — G8427 DOCREV CUR MEDS BY ELIG CLIN: HCPCS | Performed by: INTERNAL MEDICINE

## 2020-10-14 PROCEDURE — G8400 PT W/DXA NO RESULTS DOC: HCPCS | Performed by: INTERNAL MEDICINE

## 2020-10-14 PROCEDURE — 3017F COLORECTAL CA SCREEN DOC REV: CPT | Performed by: INTERNAL MEDICINE

## 2020-10-14 PROCEDURE — 1090F PRES/ABSN URINE INCON ASSESS: CPT | Performed by: INTERNAL MEDICINE

## 2020-10-14 PROCEDURE — 1123F ACP DISCUSS/DSCN MKR DOCD: CPT | Performed by: INTERNAL MEDICINE

## 2020-10-14 PROCEDURE — 1036F TOBACCO NON-USER: CPT | Performed by: INTERNAL MEDICINE

## 2020-10-14 PROCEDURE — G8484 FLU IMMUNIZE NO ADMIN: HCPCS | Performed by: INTERNAL MEDICINE

## 2020-10-14 PROCEDURE — 4040F PNEUMOC VAC/ADMIN/RCVD: CPT | Performed by: INTERNAL MEDICINE

## 2020-10-14 PROCEDURE — G8417 CALC BMI ABV UP PARAM F/U: HCPCS | Performed by: INTERNAL MEDICINE

## 2020-10-14 ASSESSMENT — ENCOUNTER SYMPTOMS
COUGH: 1
FLU SYMPTOMS: 1

## 2020-10-14 NOTE — PROGRESS NOTES
Visit Information    Have you changed or started any medications since your last visit including any over-the-counter medicines, vitamins, or herbal medicines? no   Have you stopped taking any of your medications? Is so, why? -  no  Are you having any side effects from any of your medications? - no    Have you seen any other physician or provider since your last visit?  no   Have you had any other diagnostic tests since your last visit?  no   Have you been seen in the emergency room and/or had an admission in a hospital since we last saw you?  no   Have you had your routine dental cleaning in the past 6 months?  no     Do you have an active MyChart account? If no, what is the barrier?   No: declined    Patient Care Team:  Edil Yeung MD as PCP - General (Family Medicine)  Edil Yeung MD as PCP - [facility]  Ronni Sanchez (Ophthalmology)  Alex Euceda MD as Consulting Physician (Rheumatology)  Salvador Gallegos MD as Consulting Physician (Gastroenterology)    Medical History Review  Past Medical, Family, and Social History reviewed and does not contribute to the patient presenting condition    Health Maintenance   Topic Date Due    Shingles Vaccine (1 of 2) 03/25/2003    Breast cancer screen  07/10/2020    Pneumococcal 65+ years Vaccine (2 of 2 - PPSV23) 07/19/2021    Potassium monitoring  09/03/2021    Creatinine monitoring  09/03/2021    Annual Wellness Visit (AWV)  09/04/2021    Lipid screen  09/03/2025    Colon cancer screen colonoscopy  02/10/2026    DTaP/Tdap/Td vaccine (2 - Td) 07/11/2028    DEXA (modify frequency per FRAX score)  Completed    Flu vaccine  Completed    Hepatitis C screen  Completed    Hepatitis A vaccine  Aged Out    Hepatitis B vaccine  Aged Out    Hib vaccine  Aged Out    Meningococcal (ACWY) vaccine  Aged Out

## 2020-10-14 NOTE — PROGRESS NOTES
[facility] [place] [id number]  Dept: [phone]  Dept Fax: [phone]    Harvie Goodpasture is a 77 y.o. female who presents to the urgent care today for her medicalconditions/complaints as noted below. Harvie Goodpasture is c/o of Shortness of Breath (about a week)      HPI:     Influenza   This is a new problem. The current episode started in the past 7 days. The problem occurs constantly. The problem has been unchanged. Associated symptoms include coughing and myalgias. Associated symptoms comments: SOB. Nothing aggravates the symptoms. She has tried nothing for the symptoms. The treatment provided no relief. Past Medical History:   Diagnosis Date    Asthma     Hypertension     OA (osteoarthritis)     Obesity         Current Outpatient Medications   Medication Sig Dispense Refill    pantoprazole (PROTONIX) 40 MG tablet Take 1 tablet by mouth every morning (before breakfast) 30 tablet 5    lisinopril (PRINIVIL;ZESTRIL) 5 MG tablet Take 1 tablet by mouth daily 90 tablet 3    hydroCHLOROthiazide (HYDRODIURIL) 50 MG tablet Take 1 tablet by mouth daily 90 tablet 3    vitamin C (ASCORBIC ACID) 500 MG tablet Take 1,000 mg by mouth daily      vitamin B-12 (CYANOCOBALAMIN) 100 MCG tablet Take 50 mcg by mouth daily      albuterol (PROVENTIL) (2.5 MG/3ML) 0.083% nebulizer solution Take 3 mLs by nebulization every 6 hours as needed for Wheezing or Shortness of Breath 120 vial 2    potassium chloride (MICRO-K) 10 MEQ extended release capsule TAKE ONE CAPSULE BY MOUTH TWICE DAILY 180 capsule 10    albuterol sulfate HFA (PROVENTIL HFA) 108 (90 Base) MCG/ACT inhaler Inhale 2 puffs into the lungs every 6 hours as needed for Wheezing 3 Inhaler 0     No current facility-administered medications for this visit.       Allergies   Allergen Reactions    Daypro [Oxaprozin]     Tetracyclines & Related     Erythromycin Rash       Health Maintenance   Topic Date Due    Shingles Vaccine (1 of 2) 03/25/2003    Breast cancer screen  07/10/2020    Pneumococcal 65+ years Vaccine (2 of 2 - PPSV23) 07/19/2021    Potassium monitoring  09/03/2021    Creatinine monitoring  09/03/2021    Annual Wellness Visit (AWV)  09/04/2021    Lipid screen  09/03/2025    Colon cancer screen colonoscopy  02/10/2026    DTaP/Tdap/Td vaccine (2 - Td) 07/11/2028    DEXA (modify frequency per FRAX score)  Completed    Flu vaccine  Completed    Hepatitis C screen  Completed    Hepatitis A vaccine  Aged Out    Hepatitis B vaccine  Aged Out    Hib vaccine  Aged Out    Meningococcal (ACWY) vaccine  Aged Out       Subjective:      Review of Systems   Respiratory: Positive for cough. Musculoskeletal: Positive for myalgias. All other systems reviewed and are negative. Objective:     Physical Exam  Vitals signs reviewed. Constitutional:       Appearance: Normal appearance. HENT:      Head: Normocephalic and atraumatic. Skin:     General: Skin is warm and dry. Neurological:      General: No focal deficit present. Mental Status: She is alert and oriented to person, place, and time. Psychiatric:         Mood and Affect: Mood normal.         Behavior: Behavior normal.       /69 (Site: Left Upper Arm, Position: Sitting, Cuff Size: Large Adult)   Pulse 90   Temp 98.9 °F (37.2 °C) (Oral)   Ht 5' (1.524 m)   Wt 207 lb (93.9 kg)   SpO2 97%   BMI 40.43 kg/m²       Assessment:       Diagnosis Orders   1. Flu-like symptoms  COVID-19 Ambulatory       Plan:      No follow-ups on file. No orders of the defined types were placed in this encounter.     Orders Placed This Encounter   Procedures    COVID-19 Ambulatory     Standing Status:   Future     Standing Expiration Date:   10/14/2021     Scheduling Instructions:      Saline media preferred given current shortage of viral transport media but both acceptable     Order Specific Question:   Is this test for diagnosis or screening? Answer:   Diagnosis of ill patient     Order Specific Question:   Symptomatic for COVID-19 as defined by CDC? Answer:   Yes     Order Specific Question:   Date of Symptom Onset     Answer:   10/12/2020     Order Specific Question:   Hospitalized for COVID-19? Answer:   No     Order Specific Question:   Admitted to ICU for COVID-19? Answer:   No     Order Specific Question:   Employed in healthcare setting? Answer:   No     Order Specific Question:   Resident in a congregate (group) care setting? Answer:   No     Order Specific Question:   Pregnant? Answer:   No     Order Specific Question:   Previously tested for COVID-19? Answer: Yes            Patient given educational materials - see patient instructions. Discussed use, benefit, and side effects of prescribed medications. All patientquestions answered. Pt voiced understanding.     Electronically signed by Dayanara Streeter MD on 10/14/2020at 11:50 AM

## 2020-10-18 LAB — SARS-COV-2, NAA: DETECTED

## 2020-10-19 ENCOUNTER — TELEPHONE (OUTPATIENT)
Dept: ADMINISTRATIVE | Age: 65
End: 2020-10-19

## 2020-11-06 ENCOUNTER — TELEMEDICINE (OUTPATIENT)
Dept: BEHAVIORAL/MENTAL HEALTH CLINIC | Age: 65
End: 2020-11-06
Payer: MEDICARE

## 2020-11-06 PROCEDURE — 90834 PSYTX W PT 45 MINUTES: CPT | Performed by: PSYCHOLOGIST

## 2020-11-06 PROCEDURE — 1036F TOBACCO NON-USER: CPT | Performed by: PSYCHOLOGIST

## 2020-11-06 NOTE — PROGRESS NOTES
Brice Miller,  Ph.D.   Licensed Clinical Psychologist ([phone])    Visit Date: 11/6/2020   Time of appointment:  10:15a - 11:05a  Time spent with Patient: 50 minutes. This is patient's sixth appointment. Reason for Consult:  Stress and Anxiety     Referring Provider/PCP:    No ref. provider found  Fallon Osuna MD      Pt provided informed consent for the behavioral health program. Discussed with patient model of service to include the limits of confidentiality (i.e. abuse reporting, suicide intervention, etc.) and short-term intervention focused approach. Pt indicated understanding. Pt provided verbal consent to engage in telehealth psychotherapy. This visit was completed virtually using [employer] due to contact restrictions related to the COVID-19 pandemic, as pt did test positive for COVID on 10/14/20. Session was held in a private space (pts car in [place], [place]) and she reported that she was alone. [facility]/Clinician Location:  [address]; Kena Jiménez is a 77 y.o. female who presents for follow up of anxiety. She reported that her anxiety has been somewhat elevated since she developed cold symptoms several weeks ago. She stated that she was actually not having much breathing difficulty, but did feel some tightness in her chest so she consulted with her pulmonologist who suggested she be tested for COVID-19. She stated that she was \"shocked\" that she was positive as her symptoms have been relatively mild compared to earlier this year in February and April when she was having severe respiratory issues yet twice tested negative for COVID. She reported that she is also surprised because she had been so careful since the start of the pandemic. However, she admitted she had \"let my guard down\" with one of her friends several weeks ago, who she believes is the source of her illness.   She reported that she is very grateful that her symptoms have been mild, though she has had 4 episodes in October during which she needed to use her rescue inhaler. She stated that she is continuing to follow-up with her pulmonologist, but she feels she is recovering relatively well. She reported that although she is well past the 14 day quarantine period, she is still being careful about contact with others, which is why she requested to be seen by video today with the [facility]. In regards to her marital relationship, she reported that she continues to work on coping with her . She stated that she still feels her biggest challenge is sometimes verbally reacting to him in ways she does not believe is helpful and only causes her more stress. She stated, \"It's my mouth, I have a strong personality and he pushes my buttons, so it just comes out. \"  She reported that she forgot to mention in the last session that she had actually asked her  to leave the house after discovering him eating inside a restaurant for almost an hour. She reported that they had both agreed to not do this as it was too risky, but towards the end of September, she had seen him eating at restaurant and waited to observe how long he stayed. She stated that she confronted him as he left the restaurant and asked him to leave the house. She reported that when she got home, she was very surprised that he actually \"complied with something I asked him to do. \"  She reported that she was very angry and hurt that he had violated her trust and put her health at risk, but by the end of the day she felt bad for \"kicking him out. \"  She reported that she asked him to return the next day, but expressed that she believes they need some marital counseling. She reported that they have not started counseling yet and suspects he won't be willing, but she is aware of a [facility] marital counselor they can see. She stated that since this incident, he has actually been a little better. Score 0 0 0 1 0 0 0     Interpretation of Total Score Depression Severity: 1-4 = Minimal depression, 5-9 = Mild depression, 10-14 = Moderate depression, 15-19 = Moderately severe depression, 20-27 = Severe depression    How often pt has had thoughts of death or hurting self (if PHQ positive for depression):       No flowsheet data found. Interpretation of TOD-7 score: 5-9 = mild anxiety, 10-14 = moderate anxiety, 15+ = severe anxiety. Recommend referral to behavioral health for scores 10 or greater. DIAGNOSIS  Ethyl Haw was seen today for stress and anxiety. Diagnoses and all orders for this visit:    Adjustment disorder with anxiety          INTERVENTION  Trained in strategies for increasing balanced thinking, Trained in improving communication skills, Discussed self-care (sleep, nutrition, rewarding activities, social support, exercise), Supportive techniques and Emphasized self-care as important for managing overall health      PLAN  1)  Pt will continue to engage in self-care activities, including practicing relaxation and deep breathing exercises and safely engaging in enjoyable activities in and outside of the house. 2)  Pt will continue to practice interpersonal and coping skills with her  and will consider couples counseling with a [facility]-based counselor. 3)  Pt will return for a follow-up office visit with the [facility] in 4 weeks on 12/4/20 at 11AM.       Ronal Doan 44  Is interactive complexity present?   No  Reason:  N/A  Additional Supporting Information:  N/A       Electronically signed by Goyo Elkins, PhD on 11/6/20 at 10:21 AM EST

## 2020-12-04 ENCOUNTER — TELEMEDICINE (OUTPATIENT)
Dept: BEHAVIORAL/MENTAL HEALTH CLINIC | Age: 65
End: 2020-12-04
Payer: MEDICARE

## 2020-12-04 PROCEDURE — 1036F TOBACCO NON-USER: CPT | Performed by: PSYCHOLOGIST

## 2020-12-04 PROCEDURE — 90837 PSYTX W PT 60 MINUTES: CPT | Performed by: PSYCHOLOGIST

## 2020-12-04 NOTE — PROGRESS NOTES
Martin Beckford,  Ph.D.   Licensed Clinical Psychologist ([phone])    Visit Date: 12/4/2020   Time of appointment:  11:01a -12:05p   Time spent with Patient: 64 minutes. This is patient's seventh appointment. Reason for Consult:  Stress (Related to )     Referring Provider/PCP:    No ref. provider found  Dwight Pruitt MD      Pt provided informed consent for the behavioral health program. Discussed with patient model of service to include the limits of confidentiality (i.e. abuse reporting, suicide intervention, etc.) and short-term intervention focused approach. Pt indicated understanding. Pt provided verbal consent to engage in telehealth psychotherapy. This visit was completed virtually using Mem[employer] and doxy. me due to contact restrictions related to the COVID-19 pandemic. The first 10 minutes were through [address], but due to connection issues, the video call was done via doxy. me for the rest of the visit. Session was held in a private space (pts home in [place], [place]) and she reported that she was alone.       [facility]/Clinician Location:  Home Office;  [facility] Jordana Morel is a 77 y.o. female who presents for follow up of anxiety. She reported that she feels she has been doing physically well since recovering from COVID-19. She reported that she has had to use her rescue inhaler a couple times at night over the past month. She reported that her mood has also been relatively \"ok\" aside from the ongoing stress related to her . She reported that she has been concerned about the rising COVID rates, but she has been practicing caution, especially when going to [facility]. She described some of the conflict she has experienced with her  over the past month that she found upsetting. She reported that she has felt \"a little more at peace\" with the situation with her , but can still get so irritated with his attitude and behavior. She stated that she knows a part of her still wishes or expects him to change, but she knows this is unlikely. Previous Recommendations:   1)  Pt will continue to engage in self-care activities, including practicing relaxation and deep breathing exercises and safely engaging in enjoyable activities in and outside of the house. 2)  Pt will continue to practice interpersonal and coping skills with her  and will consider couples counseling with a [facility]-based counselor. 3)  Pt will return for a follow-up office visit with the [facility] in 4 weeks on 12/4/20 at 11AM.         [address] [place] [address] Avenue was within normal limits with calm affect. Suicidal ideation was denied. Homicidal ideation was denied. Hygiene was good . Dress was neat. Behavior was Within Normal Limits with No self-report of difficulties ambulating. Attitude was Cooperative. Eye-contact was good. Speech: rate - WNL, rhythm - WNL, volume - WNL. Verbalizations were goal directed and coherent. Thought processes were intact and goal-oriented without evidence of delusions, hallucinations, obsessions, or caleb; with no cognitive distortions. Associations were characterized by intact cognitive processes. Pt was orientated oriented to person, place, time, and general circumstances;  recent:  good. Insight and judgment were estimated to be good, AEB, a good  understanding of cyclical maladaptive patterns, and the ability to use insight to inform behavior change. ASSESSMENT  Elie Wahl presented to the appointment today for evaluation and treatment of symptoms of anxiety. She is currently deemed no risk to herself or others and meets criteria for Adjustment Disorder with anxiety. She has continued to make good progress in managing her anxiety. She responded well to interventions, including modeling more effective ways to respond to and manage conflict with her  and engaging her in cognitive reframing. Brandin Sung was in agreement with recommendations. PHQ Scores 10/12/2020 9/3/2020 6/2/2020 2/21/2020 7/18/2019 10/8/2018 9/6/2017   PHQ2 Score 0 0 0 1 0 0 0   PHQ9 Score 0 0 0 1 0 0 0     Interpretation of Total Score Depression Severity: 1-4 = Minimal depression, 5-9 = Mild depression, 10-14 = Moderate depression, 15-19 = Moderately severe depression, 20-27 = Severe depression    How often pt has had thoughts of death or hurting self (if PHQ positive for depression):       No flowsheet data found. Interpretation of TOD-7 score: 5-9 = mild anxiety, 10-14 = moderate anxiety, 15+ = severe anxiety. Recommend referral to behavioral health for scores 10 or greater. DIAGNOSIS  Brandin Sung was seen today for stress.     Diagnoses and all orders for this visit:    Adjustment disorder with anxiety          INTERVENTION

## 2021-01-08 ENCOUNTER — VIRTUAL VISIT (OUTPATIENT)
Dept: BEHAVIORAL/MENTAL HEALTH CLINIC | Age: 66
End: 2021-01-08
Payer: MEDICARE

## 2021-01-08 DIAGNOSIS — F43.22 ADJUSTMENT DISORDER WITH ANXIETY: Primary | ICD-10-CM

## 2021-01-08 PROCEDURE — 90837 PSYTX W PT 60 MINUTES: CPT | Performed by: PSYCHOLOGIST

## 2021-01-08 PROCEDURE — 1036F TOBACCO NON-USER: CPT | Performed by: PSYCHOLOGIST

## 2021-01-08 NOTE — PROGRESS NOTES
Shameka Cunningham,  Ph.D.   Licensed Clinical Psychologist ([phone])    Visit Date: 1/8/2021   Time of appointment:  11:08a - 12:08p   Time spent with Patient: 60 minutes. This is patient's eighth appointment. Reason for Consult:  Stress     Referring Provider/PCP:    No ref. provider found  Kirt Esteves MD      Pt provided informed consent for the behavioral health program. Discussed with patient model of service to include the limits of confidentiality (i.e. abuse reporting, suicide intervention, etc.) and short-term intervention focused approach. Pt indicated understanding. Pt provided verbal consent to engage in telehealth psychotherapy.  This visit was completed virtually using doxy. me due to contact restrictions related to the COVID-19 pandemic. Emely Cade was held in a private space ([address] [place] [address]) and she reported that she was alone.       [facility]/Clinician Location:  Home Office;  [facility] Stephanie Albert is a 77 y.o. female who presents for follow up of anxiety. She reported that she feels she has been doing relatively well over the past several weeks in regards to her overall mood. She stated that it was [place] adjustment\" not being able to celebrate the holidays with family and friends because of the pandemic, but she was still able to find some enjoyment in the holiday season. She reported that she has noticed that she's been moving slower, breathing harder, and has felt more easily winded over the past several weeks, especially when she's busier and has more on her schedule. She reported that this is a noticeable difference compared to last year, but even in just the past few months. She stated that she's trying to break up tasks and take breaks when she needs them, which is helpful but is what often causes her to do things more slowly than usual.  She reported that she has been doing her breathing exercises and she does have a follow-up scheduled with her pulmonologist to check her lung functioning. She reported that she has re-started taking vitamin B12, zinc, vitamin C, and calcium over the past couple weeks and she plans to add vitamin D. She reported that she wants to work on improving her nutrition to help lose weight and improve her energy level. Previous Recommendations:   1)  Pt will continue to engage in self-care activities, including practicing relaxation and deep breathing exercises and safely engaging in enjoyable activities in and outside of the house. 2)  Pt will continue to practice interpersonal and coping skills with her . 3)  Pt will be seen for a virtual follow-up with the [facility] in 5 weeks, after the holidays, on 1/8/21 at 11AM.         [address] [place] [address] was euthymic with congruent affect. Suicidal ideation was denied. Homicidal ideation was denied. Hygiene was good . Dress was appropriate. Behavior was Within Normal Limits with No self-report of difficulties ambulating. Attitude was Cooperative. Eye-contact was good. Speech: rate - WNL, rhythm - WNL, volume - WNL. Verbalizations were goal directed and coherent. Thought processes were intact and goal-oriented without evidence of delusions, hallucinations, obsessions, or caleb; with no cognitive distortions. Associations were characterized by intact cognitive processes. Pt was orientated oriented to person, place, time, and general circumstances;  recent:  good. Insight and judgment were estimated to be good, AEB, a good  understanding of cyclical maladaptive patterns, and the ability to use insight to inform behavior change. ASSESSMENT  Randall Diego presented to the appointment today for evaluation and treatment of symptoms of anxiety. She is currently deemed no risk to herself or others and meets criteria for Adjustment Disorder with anxiety. She responded well to interventions and has been managing anxiety relatively well. Ways to improve her eating and nutrition were discussed. The books \"The UltraMind Solution,\" \"Intuitive Eating,\" and \"The End of Alzheimer's\" were recommended to learn more about improving eating as well as about the connection between nutrition and mental health and cognitive functioning. She was also encouraged to research and check with her PCP about trying fish oil and magnesium supplements, which can help support mood and cognitive functioning. Vandana Norris was in agreement with recommendations.     PHQ Scores 10/12/2020 9/3/2020 6/2/2020 2/21/2020 7/18/2019 10/8/2018 9/6/2017   PHQ2 Score 0 0 0 1 0 0 0   PHQ9 Score 0 0 0 1 0 0 0     Interpretation of Total Score Depression Severity: 1-4 = Minimal depression, 5-9 = Mild depression, 10-14 = Moderate depression, 15-19 = Moderately severe depression, 20-27 = Severe depression

## 2021-01-28 ENCOUNTER — TELEPHONE (OUTPATIENT)
Dept: FAMILY MEDICINE CLINIC | Age: 66
End: 2021-01-28

## 2021-01-28 DIAGNOSIS — Z76.0 MEDICATION REFILL: Primary | ICD-10-CM

## 2021-01-28 RX ORDER — POTASSIUM CHLORIDE 750 MG/1
10 TABLET, EXTENDED RELEASE ORAL DAILY
Qty: 90 TABLET | Refills: 0 | Status: SHIPPED | OUTPATIENT
Start: 2021-01-28 | End: 2021-08-23

## 2021-01-28 RX ORDER — LISINOPRIL 5 MG/1
5 TABLET ORAL DAILY
Qty: 90 TABLET | Refills: 0 | Status: SHIPPED | OUTPATIENT
Start: 2021-01-28 | End: 2021-04-30

## 2021-01-28 RX ORDER — HYDROCHLOROTHIAZIDE 50 MG/1
50 TABLET ORAL DAILY
Qty: 90 TABLET | Refills: 0 | Status: SHIPPED | OUTPATIENT
Start: 2021-01-28 | End: 2021-04-30

## 2021-01-28 RX ORDER — ALBUTEROL SULFATE 90 UG/1
2 AEROSOL, METERED RESPIRATORY (INHALATION) EVERY 6 HOURS PRN
Qty: 3 INHALER | Refills: 3 | Status: SHIPPED | OUTPATIENT
Start: 2021-01-28 | End: 2021-02-18

## 2021-01-28 RX ORDER — POTASSIUM CHLORIDE 750 MG/1
CAPSULE, EXTENDED RELEASE ORAL
Qty: 180 CAPSULE | Refills: 10 | Status: CANCELLED | OUTPATIENT
Start: 2021-01-28

## 2021-01-28 RX ORDER — PANTOPRAZOLE SODIUM 40 MG/1
40 TABLET, DELAYED RELEASE ORAL
Qty: 90 TABLET | Refills: 0 | Status: SHIPPED | OUTPATIENT
Start: 2021-01-28 | End: 2021-08-12

## 2021-01-28 NOTE — TELEPHONE ENCOUNTER
Would recommend she come in to discuss. Refills sent. Regular KCL should be fine. I [facility] k[facility] Should not make a difference.

## 2021-01-28 NOTE — TELEPHONE ENCOUNTER
Patient is calling switching pharmacies and stated that [facility] could not find Potassium Chloride ER, but they could find Potassium Chloride SA, what is SA? Is it equivalent to ER ? Can she start taking that? If not she can just get it at a local pharmacy instead. Other meds are [facility]. Also asking for advice stating that she broke out with red bumps all over her body and they have been itching and now has broken her skin from scratching them, she doesn't know from what exactly, shes asking if she needs to be seen or should she find a allergists? Hers retired.

## 2021-02-05 ENCOUNTER — VIRTUAL VISIT (OUTPATIENT)
Dept: BEHAVIORAL/MENTAL HEALTH CLINIC | Age: 66
End: 2021-02-05
Payer: MEDICARE

## 2021-02-05 DIAGNOSIS — F43.22 ADJUSTMENT DISORDER WITH ANXIETY: Primary | ICD-10-CM

## 2021-02-05 PROCEDURE — 1036F TOBACCO NON-USER: CPT | Performed by: PSYCHOLOGIST

## 2021-02-05 PROCEDURE — 90837 PSYTX W PT 60 MINUTES: CPT | Performed by: PSYCHOLOGIST

## 2021-02-05 NOTE — PROGRESS NOTES
Bruce Morfin,  Ph.D.   Licensed Clinical Psychologist ([phone])    Visit Date: 2/5/2021   Time of appointment:  11:13a - 12:13p   Time spent with Patient: 60 minutes. This is patient's ninth appointment. Reason for Consult:  Stress and Anxiety     Referring Provider/PCP:    No ref. provider found  Bernard Stein DO      Pt provided informed consent for the behavioral health program. Discussed with patient model of service to include the limits of confidentiality (i.e. abuse reporting, suicide intervention, etc.) and short-term intervention focused approach. Pt indicated understanding. Pt provided verbal consent to engage in telehealth psychotherapy.  This visit was completed virtually using doxy. me due to contact restrictions related to the COVID-19 pandemic.  Session was held in a private space ([address] [place] [address]) and she reported that she was alone.       [facility]/Clinician Location:  Home Office; Zain Berg is a 77 y.o. female who presents for follow up of anxiety and stress (related to relationship issues). She reported that over the past month she has been doing very well, both physically and mentally. In regards to her pulmonary issues, she stated, \"I feel like I've turned another page breathing wise. \"  She stated that her breather has become much clearer and even though she still gets a little winded at times, she is noticing it interfere with her daily activities as much. As a result, she stated, \"my breath is no longer by number 1 thought. 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 She reported that she still takes breaks throughout her day to give her self time to rest and recuperate. She reported that she still has days when she feels extremely frustrated and irritated with her .   However, over the past week, she has noticed that he's been Enedelia Raider bit kinder\" and has been doing with recommendations. PHQ Scores 10/12/2020 9/3/2020 6/2/2020 2/21/2020 7/18/2019 10/8/2018 9/6/2017   PHQ2 Score 0 0 0 1 0 0 0   PHQ9 Score 0 0 0 1 0 0 0     Interpretation of Total Score Depression Severity: 1-4 = Minimal depression, 5-9 = Mild depression, 10-14 = Moderate depression, 15-19 = Moderately severe depression, 20-27 = Severe depression    How often pt has had thoughts of death or hurting self (if PHQ positive for depression):       No flowsheet data found. Interpretation of TOD-7 score: 5-9 = mild anxiety, 10-14 = moderate anxiety, 15+ = severe anxiety. Recommend referral to behavioral health for scores 10 or greater. DIAGNOSIS  Pamella Jacobs was seen today for stress and anxiety. Diagnoses and all orders for this visit:    Adjustment disorder with anxiety          INTERVENTION  Trained in strategies for increasing balanced thinking, Trained in relaxation strategies, Trained in improving communication skills, Discussed self-care (sleep, nutrition, rewarding activities, social support, exercise), Supportive techniques and Emphasized self-care as important for managing overall health      PLAN  1)  Pt will continue to engage in self-care activities, including practicing relaxation and deep breathing exercises, safely engaging in enjoyable activities in and outside of the house, and improving nutrition and eating habits. 2)  Pt will continue to practice interpersonal and coping skills with her . 3)  Pt will be seen for a virtual follow-up by the [facility] in 4 weeks on 3/5/21 at 11AM.      INTERACTIVE COMPLEXITY  Is interactive complexity present?   No  Reason:  N/A  Additional Supporting Information:  N/A       Electronically signed by Nancy Leonard, PhD on 2/5/21 at 11:17 AM EST

## 2021-02-11 ENCOUNTER — TELEPHONE (OUTPATIENT)
Dept: FAMILY MEDICINE CLINIC | Age: 66
End: 2021-02-11

## 2021-02-11 RX ORDER — EPINEPHRINE 0.3 MG/.3ML
INJECTION INTRAMUSCULAR
Qty: 1 EACH | Refills: 2 | Status: SHIPPED | OUTPATIENT
Start: 2021-02-11 | End: 2022-01-18 | Stop reason: SDUPTHER

## 2021-02-11 NOTE — TELEPHONE ENCOUNTER
Pt wants epi-pen to have on hand when she gets the covid vaccination tomorrow        Mehran Baron is calling to request a refill on the following medication(s):    Medication Request:  Requested Prescriptions     Pending Prescriptions Disp Refills    EPIPEN 2-AMRITA 0.3 MG/0.3ML SOAJ injection 1 each 2     Sig: prn       Last Visit Date (If Applicable):  3/60/3960    Next Visit Date:    2/18/2021

## 2021-02-12 ENCOUNTER — IMMUNIZATION (OUTPATIENT)
Dept: FAMILY MEDICINE CLINIC | Age: 66
End: 2021-02-12
Payer: MEDICARE

## 2021-02-12 PROCEDURE — 91301 COVID-19, MODERNA VACCINE 100MCG/0.5ML DOSE: CPT | Performed by: INTERNAL MEDICINE

## 2021-02-12 PROCEDURE — 0011A PR IMM ADMN SARSCOV2 100 MCG/0.5 ML 1ST DOSE: CPT | Performed by: INTERNAL MEDICINE

## 2021-02-18 ENCOUNTER — OFFICE VISIT (OUTPATIENT)
Dept: FAMILY MEDICINE CLINIC | Age: 66
End: 2021-02-18
Payer: MEDICARE

## 2021-02-18 VITALS
TEMPERATURE: 97 F | OXYGEN SATURATION: 97 % | HEART RATE: 87 BPM | DIASTOLIC BLOOD PRESSURE: 80 MMHG | BODY MASS INDEX: 42.49 KG/M2 | SYSTOLIC BLOOD PRESSURE: 128 MMHG | WEIGHT: 216.4 LBS | HEIGHT: 60 IN

## 2021-02-18 DIAGNOSIS — Z71.3 DIETARY COUNSELING: ICD-10-CM

## 2021-02-18 DIAGNOSIS — H00.15 CHALAZION LEFT LOWER EYELID: ICD-10-CM

## 2021-02-18 DIAGNOSIS — E78.5 DYSLIPIDEMIA: ICD-10-CM

## 2021-02-18 DIAGNOSIS — R73.9 HYPERGLYCEMIA: ICD-10-CM

## 2021-02-18 DIAGNOSIS — I10 HTN, GOAL BELOW 130/80: ICD-10-CM

## 2021-02-18 DIAGNOSIS — Z76.89 ESTABLISHING CARE WITH NEW DOCTOR, ENCOUNTER FOR: Primary | ICD-10-CM

## 2021-02-18 PROCEDURE — 1123F ACP DISCUSS/DSCN MKR DOCD: CPT | Performed by: FAMILY MEDICINE

## 2021-02-18 PROCEDURE — G8427 DOCREV CUR MEDS BY ELIG CLIN: HCPCS | Performed by: FAMILY MEDICINE

## 2021-02-18 PROCEDURE — 1036F TOBACCO NON-USER: CPT | Performed by: FAMILY MEDICINE

## 2021-02-18 PROCEDURE — G8484 FLU IMMUNIZE NO ADMIN: HCPCS | Performed by: FAMILY MEDICINE

## 2021-02-18 PROCEDURE — G8417 CALC BMI ABV UP PARAM F/U: HCPCS | Performed by: FAMILY MEDICINE

## 2021-02-18 PROCEDURE — G8400 PT W/DXA NO RESULTS DOC: HCPCS | Performed by: FAMILY MEDICINE

## 2021-02-18 PROCEDURE — 99214 OFFICE O/P EST MOD 30 MIN: CPT | Performed by: FAMILY MEDICINE

## 2021-02-18 PROCEDURE — 1090F PRES/ABSN URINE INCON ASSESS: CPT | Performed by: FAMILY MEDICINE

## 2021-02-18 PROCEDURE — 4040F PNEUMOC VAC/ADMIN/RCVD: CPT | Performed by: FAMILY MEDICINE

## 2021-02-18 PROCEDURE — 3017F COLORECTAL CA SCREEN DOC REV: CPT | Performed by: FAMILY MEDICINE

## 2021-02-18 RX ORDER — VIT C/B6/B5/MAGNESIUM/HERB 173 50-5-6-5MG
CAPSULE ORAL
COMMUNITY
End: 2021-08-23

## 2021-02-18 RX ORDER — VIT C/HESPERIDIN/BIOFLAVONOIDS 500-100 MG
TABLET ORAL
COMMUNITY
End: 2021-08-23

## 2021-02-18 ASSESSMENT — PATIENT HEALTH QUESTIONNAIRE - PHQ9
SUM OF ALL RESPONSES TO PHQ QUESTIONS 1-9: 0
SUM OF ALL RESPONSES TO PHQ9 QUESTIONS 1 & 2: 0
SUM OF ALL RESPONSES TO PHQ QUESTIONS 1-9: 0
SUM OF ALL RESPONSES TO PHQ QUESTIONS 1-9: 0
1. LITTLE INTEREST OR PLEASURE IN DOING THINGS: 0
SUM OF ALL RESPONSES TO PHQ QUESTIONS 1-9: 0
1. LITTLE INTEREST OR PLEASURE IN DOING THINGS: 0

## 2021-02-18 NOTE — PATIENT INSTRUCTIONS
Learning About Obesity  What is obesity? Obesity means having a body mass index (BMI) of 30 or above. BMI is a number that is calculated from your weight and your height. How do you know if your weight is in the obesity range? To know if your weight is in the obesity range, your doctor looks at your body mass index (BMI) and waist size. BMI is a number that is calculated from your weight and your height. To figure out your BMI for yourself, you can use an online tool, such as http://www.rasheed.com/ on the [employer]. If your BMI is 30.0 or higher, it falls within the obesity range. Keep in mind that BMI and waist size are only guides. They are not tools to determine your ideal body weight. What causes obesity? When you take in more calories than you burn off, you gain weight. How you eat, how active you are, and other things affect how your body uses calories and whether you gain weight. If you have family members who have too much body fat, you may have inherited a tendency to gain weight. And your family also helps form your eating and lifestyle habits, which can lead to obesity. Also, our busy lives make it harder to plan and cook healthy meals. For many of us, it's easier to reach for prepared foods, go out to eat, or go to the drive-through. But these foods are often high in saturated fat and calories. Portions are often too large. What can you do to reach a healthy weight? Focus on health, not diets. Diets are hard to stay on and don't work in the long run. It is very hard to stay with a diet that includes lots of big changes in your eating habits. Instead of a diet, focus on lifestyle changes that will improve your health and achieve the right balance of energy and calories. To lose weight, you need to burn more calories than you take in. You can do it by eating healthy foods in reasonable amounts and becoming more active, even a little bit every day. Making small changes over time can add up to a lot. Make a plan for change. Many people have found that naming their reasons for change and staying focused on their plan can make a big difference. Work with your doctor to create a plan that is right for you. · Ask yourself: Asad Feeler are my personal, most powerful reasons for wanting this change? What will my life look like when I've made the change? \"  · Set your long-term goal. Make it specific, such as \"I will lose x pounds. \"  · Break your long-term goal into smaller, short-term goals. Make these small steps specific and within your reach, things you know you can do. These steps are what keep you going from day to day. Talk with your doctor about other weight-loss options. If you have a BMI in a certain range and have not been able to lose weight with diet and exercise, medicine or surgery may be an option for you. Before your doctor will prescribe medicines or surgery, he or she will probably want you to be more active and follow your healthy eating plan for a period of time. These habits are key lifelong changes for managing your weight, with or without other medical treatment. And these changes can help you avoid weight-related health problems. How can you stay on your plan for change? Be ready. Choose to start during a time when there are few events that might trigger slip-ups, like holidays, social events, and high-stress periods. Decide on your first few steps. Most people have more success when they make small changes, one step at a time. For example, you might switch a daily candy bar to a piece of fruit, walk 10 minutes more, or add more vegetables to a meal.  Line up your support people. Make sure you're not going to be alone as you make this change. Connect with people who understand how important it is to you. Ask family members and friends for help in keeping with your plan. And think about who could make it harder for you, and how to handle them. Try tracking. People who keep track of what they eat, feel, and do are better at losing weight. Try writing down things like:  · What and how much you eat. · How you feel before and after each meal.  · Details about each meal (like eating out or at home, eating alone, or with friends or family). · What you do to be active. Look and plan. As you track, look for patterns that you may want to change. Take note of:  · When you eat and whether you skip meals. · How often you eat out. · How many fruits and vegetables you eat. · When you eat beyond feeling full. · When and why you eat for reasons other than being hungry. When you stray from your plan, don't get upset. Figure out what made you slip up and how you can fix it. Can you take medicines or have surgery to lose weight? If you have a BMI in a certain range and have not been able to lose weight with diet and exercise, medicine or surgery may be an option for you. If you have a BMI of at least 30.0 (or a BMI of at least 27.0 and another health problem related to your weight), ask your doctor about weight-loss medicines. They work by making you feel less hungry, making you feel full more quickly, or changing how you digest fat. Medicines are used along with diet changes and more physical activity to help you make lasting changes. If you have a BMI of 40.0 or more (or a BMI of 35.0 or more and another health problem related to your weight), your doctor may talk with you about surgery. Weight-loss surgery has risks, and you will need to work with your doctor to compare the risk of having obesity with the risks of surgery. With any option you choose, you will still need to eat a healthy diet and get regular exercise. Follow-up care is a key part of your treatment and safety. Be sure to make and go to all appointments, and call your doctor if you are having problems. It's also a good idea to know your test results and keep a list of the medicines you take. Where can you learn more? Go to https://TopVisiblepeMeitu.ViaView. org and sign in to your [employer] [facility] account. Enter N111 in the weeSpring box to learn more about \"Learning About Obesity. \"     If you do not have an account, please click on the \"Sign Up Now\" link. Current as of: December 11, 2019               Content Version: 12.6  © 0556-9294 [employer]. Care instructions adapted under license by [facility] ([facility]). If you have questions about a medical condition or this instruction, always ask your healthcare professional. [address] any warranty or liability for your use of this information.

## 2021-02-18 NOTE — PROGRESS NOTES
Progress Note    Lou Vuong is a 77 y.o.  female who presents today alone for evaluation of   Chief Complaint   Patient presents with    Discuss Medications    Establish Care           HPI:   Patient is here to est care today. Patient PMH obesity, allergies, RAD, HTN, dyslipidemia, and hyperglycemia. Patient [address] [place] left ear surgery, c-sec x2, and gb removal. Patient fhx mom asthma & dad liver cancer/HTN. Patient is a never smoker. Patient denies regular EtOH consumption. Patient denies illicits. Patient reports decreased mood and occasional anxiety. Patient states this does stem from her asthma and the COVID-19 pandemic. Patient denies specific diet. Patient denies regular aerobic activity. Patient follows with pulmonology for her asthma. Patient had negative mammogram 10/2020. Patient had colonoscopy 2016 and was recommended to have repeat in 10 yrs. Patient last PAP was 2018 and was negative. Patient follows with Dr. Stephen Sood for CBT and psychology. Patient follows with GYN. PHQ-9 Total Score: 0 (2/18/2021 10:23 AM)    Patient requests referral to dietitian today. Patient denies cp/sob/le edema/dizziness/lightheadedness/blurry va/ha. Patient denies PND/orthopnea. Patient reports lower eyelid mass for the past 4-5 days. Patient states it is itchy. Patient states she has been placing hydrogen peroxide on it with no relief. Patient denies trauma to the eye. Patient denies bite. Health Maintenance Due   Topic Date Due    Shingles Vaccine (1 of 2) 03/25/2003        Current Medications:     Current Outpatient Medications   Medication Sig Dispense Refill    Turmeric (QC TUMERIC COMPLEX) 500 MG CAPS Take by mouth      Zinc 30 MG TABS Take by mouth      Probiotic Product (PROBIOTIC MATURE ADULT PO) Take by mouth      tobramycin-dexamethasone (TOBRADEX) 0.3-0.1 % ophthalmic ointment 3 times daily.  3.5 g 0    EPIPEN 2-AMRITA 0.3 MG/0.3ML SOAJ injection prn 1 each 2    lisinopril (PRINIVIL;ZESTRIL) 5 MG tablet Take 1 tablet by mouth daily 90 tablet 0    hydroCHLOROthiazide (HYDRODIURIL) 50 MG tablet Take 1 tablet by mouth daily 90 tablet 0    pantoprazole (PROTONIX) 40 MG tablet Take 1 tablet by mouth every morning (before breakfast) 90 tablet 0    potassium chloride (KLOR-CON M) 10 MEQ extended release tablet Take 1 tablet by mouth daily 90 tablet 0    vitamin C (ASCORBIC ACID) 500 MG tablet Take 1,000 mg by mouth daily      vitamin B-12 (CYANOCOBALAMIN) 100 MCG tablet Take 50 mcg by mouth daily      albuterol (PROVENTIL) (2.5 MG/3ML) 0.083% nebulizer solution Take 3 mLs by nebulization every 6 hours as needed for Wheezing or Shortness of Breath 120 vial 2    albuterol sulfate HFA (PROVENTIL HFA) 108 (90 Base) MCG/ACT inhaler Inhale 2 puffs into the lungs every 6 hours as needed for Wheezing 3 Inhaler 0     No current facility-administered medications for this visit. Allergies:      Allergies   Allergen Reactions    Daypro [Oxaprozin]     Tetracyclines & Related     Erythromycin Rash        Medical History:     Past Medical History:   Diagnosis Date    Asthma     Hypertension     OA (osteoarthritis)     Obesity        Past Surgical History:   Procedure Laterality Date     SECTION      x2    CHOLECYSTECTOMY      COLONOSCOPY      ; ; due     EAR SURGERY Left        Family History   Problem Relation Age of Onset    Asthma Mother     Liver Cancer Father     Hypertension Father         Social History:     Social History     Socioeconomic History    Marital status:      Spouse name: Not on file    Number of children: Not on file    Years of education: Not on file    Highest education level: Not on file   Occupational History    Not on file   Social Needs    Financial resource strain: Not on file    Food insecurity     Worry: Not on file     Inability: Not on file    Transportation needs     Medical: Not on file     Non-medical: Not on file   Tobacco Use  Smoking status: Never Smoker    Smokeless tobacco: Never Used   Substance and Sexual Activity    Alcohol use: Yes     Comment: rare     Drug use: No    Sexual activity: Not Currently     Partners: Male   Lifestyle    Physical activity     Days per week: Not on file     Minutes per session: Not on file    Stress: Not on file   Relationships    Social connections     Talks on phone: Not on file     Gets together: Not on file     Attends Anabaptism service: Not on file     Active member of club or organization: Not on file     Attends meetings of clubs or organizations: Not on file     Relationship status: Not on file    Intimate partner violence     Fear of current or ex partner: Not on file     Emotionally abused: Not on file     Physically abused: Not on file     Forced sexual activity: Not on file   Other Topics Concern    Not on file   Social History Narrative    Not on file        ROS:     Constitutional: No fevers, chills, fatigue. ENT: No nasal congestion or sore throat  Respiratory: No difficulty in breathing or cough. Cardiovascular: No chest pain, palpitations or shortness of breath  Gastrointestinal: No abdominal pain or change in bowel movements. Genitourinary: No change in urinary frequency or dysuria. Skin: +skin lesion over LLL; no rash; +dry skin  Neurological: No weakness. No headaches. Last Filed Vitals:  /80   Pulse 87   Temp 97 °F (36.1 °C) (Temporal)   Ht 5' (1.524 m)   Wt 216 lb 6.4 oz (98.2 kg)   SpO2 97%   BMI 42.26 kg/m²      Physical Examination:     GENERAL APPEARANCE: in no acute distress, well developed, well nourished. HEAD: normocephalic, atraumatic. EYES: extraocular movement intact (EOMI), pupils equal, round, reactive to light and accommodation. EARS: normal, tympanic membrane intact, clear, auditory canal clear. NOSE: nares patent, no erythema, sinuses nontender bilaterally, no rhinorrhea. ORAL CAVITY: mucosa moist, no lesions. THROAT: clear, no mass, no exudate. NECK/THYROID: neck supple, full range of motion, no thyromegaly. HEART: no murmurs, regular rate and rhythm, S1, S2 normal.   LUNGS: clear to auscultation bilaterally, no wheezes, rales, rhonchi. ABDOMEN: normal, bowel sounds present, soft, nontender, nondistended, no rebound guarding or rigidity  SKIN: +LLL chalazion; +dry skin throughout    Recent Labs/ In Office Testing/ Radiograph review:     Hospital Outpatient Visit on 10/14/2020   Component Date Value Ref Range Status    SARS-CoV-2, RUSTY 10/14/2020 Detected* Not Detected Final    Comment: (NOTE)  This nucleic acid amplification test was developed and its  performance characteristics determined by [facility]. Nucleic acid amplification tests include PCR and TMA. This test has  not been FDA cleared or approved. This test has been authorized by  FDA under an Emergency Use Authorization (EUA). This test is only  authorized for the duration of time the declaration that  circumstances exist justifying the authorization of the emergency use  of in vitro diagnostic tests for detection of SARS-CoV-2 virus and/or  diagnosis of COVID-19 infection under section 564(b)(1) of the Act,  21 U. S.C. 456TIK-4(F) (1), unless the authorization is terminated or  revoked sooner. When diagnostic testing is negative, the possibility of a false  negative result should be considered in the context of a patient's  recent exposures and the presence of clinical signs and symptoms  consistent with COVID-19. An individual without symptoms of COVID-  19 and who is not shedding SARS-CoV-2 vi                           brenda would expect to have a  negative (not detected) result in this assay. Performed At: [address] [id number]  Miracle Washburn MD KE:[id number]         No results found for this visit on 02/18/21.            Assessment/Plan:     Beatris Braxton was seen today for discuss medications and establish care. Diagnoses and all orders for this visit:    Establishing care with new doctor, encounter for    BMI 40.0-44.9, adult [facility])  -     [place]    Dyslipidemia  -     ALT; Future  -     AST; Future  -     CBC Auto Differential; Future  -     Lipid Panel; Future  -     TSH with Reflex; [facility]  -     [facility]-  [place]    HTN, goal below 130/80  -     Magnesium; Future  -     Renal Function Panel; Future  -     [facility]-  [place]    Hyperglycemia  -     Hemoglobin A1C; [facility]  -     [facility]-  [place]    Chalazion left lower eyelid  -     tobramycin-dexamethasone (TOBRADEX) 0.3-0.1 % ophthalmic ointment; 3 times daily. Dietary counseling  -      BMI ABOVE NORMAL F/U    Follow up on labs. Encouraged well balanced diet. Encouraged 150 mins of aerobic activity weekly. Tobradex as above for chalazion. Asked her to see her eye specialist if no improvement. Referral for nutritionist provided. Encouraged moisturizer daily. Encouraged hypoallergenic laundry detergent and dove soap for dry skin. All questions answered and addressed to patient satisfaction. Patient understands and agrees to the plan. The patient was evaluated and treated today based on the osteopathic principle that each person is a unit of body, mind, and spirit, the body is capable of self-regulation, self-healing, and health maintenance and that structure and function are reciprocally interrelated. Follow-up:   Return in about 6 months (around 8/18/2021) for htn; 20 min appt. Tan Hess D.O.    BMI was elevated today, and weight loss plan recommended is : conventional weight loss.

## 2021-03-12 ENCOUNTER — IMMUNIZATION (OUTPATIENT)
Dept: FAMILY MEDICINE CLINIC | Age: 66
End: 2021-03-12
Payer: MEDICARE

## 2021-03-12 PROCEDURE — 91301 COVID-19, MODERNA VACCINE 100MCG/0.5ML DOSE: CPT | Performed by: INTERNAL MEDICINE

## 2021-03-12 PROCEDURE — 0012A COVID-19, MODERNA VACCINE 100MCG/0.5ML DOSE: CPT | Performed by: INTERNAL MEDICINE

## 2021-03-26 ENCOUNTER — VIRTUAL VISIT (OUTPATIENT)
Dept: BEHAVIORAL/MENTAL HEALTH CLINIC | Age: 66
End: 2021-03-26
Payer: MEDICARE

## 2021-03-26 DIAGNOSIS — F43.22 ADJUSTMENT DISORDER WITH ANXIETY: Primary | ICD-10-CM

## 2021-03-26 PROCEDURE — 1036F TOBACCO NON-USER: CPT | Performed by: PSYCHOLOGIST

## 2021-03-26 PROCEDURE — 90837 PSYTX W PT 60 MINUTES: CPT | Performed by: PSYCHOLOGIST

## 2021-03-26 NOTE — PROGRESS NOTES
Zee Post,  Ph.D.   Licensed Clinical Psychologist ([phone])    Visit Date: 3/26/2021   Time of appointment:  11:04a - 12:10p   Time spent with Patient: 66 minutes. This is patient's tenth appointment. Reason for Consult:  Anxiety     Referring Provider/PCP:    No ref. provider found  Shahana Swan DO      Pt provided informed consent for the behavioral health program. Discussed with patient model of service to include the limits of confidentiality (i.e. abuse reporting, suicide intervention, etc.) and short-term intervention focused approach. Pt indicated understanding. Pt provided verbal consent to engage in telehealth psychotherapy.  This visit was completed virtually using doxy. me due to contact restrictions related to the COVID-19 pandemic.  Session was held in a private space ([address] [place] [address]) and she reported that she was alone.       [facility]/Clinician Location:  Home Office; Davina Cotton is a 74 y.o. female who presents for follow up of anxiety. She reported that she has been feeling more anxious over the past few weeks, especially since the birth of her first grandchild on 2/11/21. She stated that she has noticed that she's been more conscious of her breath again, but she has not had any major increase in struggling with her actual breathing. She reported that she feels that her anxiety has been mostly manageable and has been able to continue living her life with little impairment from the increased anxiety. 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 However, she identified the biggest source of her stress to be the new grandchild and the tension and conflict this has created in her relationship with her daughter.   She reported that she feels a \"wall\" has formed between them because of their different views on childcare, which has been upsetting as she wants them to be able to \"be at peace\" in their relationship. She described the kinds of conflicts they have been having and the worries she has experienced with the new grandchild. Previous Recommendations:   1)  Pt will continue to engage in self-care activities, including practicing relaxation and deep breathing exercises, safely engaging in enjoyable activities in and outside of the house, and improving nutrition and eating habits. 2)  Pt will continue to practice interpersonal and coping skills with her . 3)  Pt will be seen for a virtual follow-up by the [facility] in 4 weeks on 3/5/21 at 11AM.        MENTAL STATUS EXAM  Mood was anxious with congruent affect. Suicidal ideation was denied. Homicidal ideation was denied. Hygiene was good . Dress was appropriate. Behavior was Within Normal Limits with No self-report of difficulties ambulating. Attitude was Cooperative. Eye-contact was good. Speech: rate - WNL, rhythm - WNL, volume - WNL. Verbalizations were goal directed and coherent. Thought processes were intact and goal-oriented without evidence of delusions, hallucinations, obsessions, or caleb; with no cognitive distortions. Associations were characterized by intact cognitive processes. Pt was orientated oriented to person, place, time, and general circumstances;  recent:  good. Insight and judgment were estimated to be good, AEB, a good  understanding of cyclical maladaptive patterns, and the ability to use insight to inform behavior change. ASSESSMENT  Ben Abraham presented to the appointment today for evaluation and treatment of symptoms of anxiety. She is currently deemed no risk to herself or others and meets criteria for adjustment disorder with anxiety.   She responded well to interventions, including psychoeducation on anxiety as a new grandmother and modeling ways to communicate and manage conflict with her daughter more gina, which could not be addressed in today's session. INTERACTIVE COMPLEXITY  Is interactive complexity present?   No  Reason:  N/A  Additional Supporting Information:  N/A       Electronically signed by Jonathan Yoon, PhD on 3/26/21 at 11:03 AM [email or web address]

## 2021-04-16 ENCOUNTER — VIRTUAL VISIT (OUTPATIENT)
Dept: BEHAVIORAL/MENTAL HEALTH CLINIC | Age: 66
End: 2021-04-16
Payer: MEDICARE

## 2021-04-16 DIAGNOSIS — F43.22 ADJUSTMENT DISORDER WITH ANXIETY: Primary | ICD-10-CM

## 2021-04-16 PROCEDURE — 1036F TOBACCO NON-USER: CPT | Performed by: PSYCHOLOGIST

## 2021-04-16 PROCEDURE — 90837 PSYTX W PT 60 MINUTES: CPT | Performed by: PSYCHOLOGIST

## 2021-04-16 NOTE — PROGRESS NOTES
Maria E Rivera,  Ph.D.   Licensed Clinical Psychologist ([phone])    Visit Date: 4/16/2021   Time of appointment:  11:09a - 12:09p   Time spent with Patient: 60 minutes. This is patient's 11th appointment. Reason for Consult:  Anxiety and Stress     Referring Provider/PCP:    No ref. provider found  Sridharharpreet DO Jaja      Pt provided informed consent for the behavioral health program. Discussed with patient model of service to include the limits of confidentiality (i.e. abuse reporting, suicide intervention, etc.) and short-term intervention focused approach. Pt indicated understanding. Pt provided verbal consent to engage in telehealth psychotherapy.  This visit was completed virtually using doxy. me due to contact restrictions related to the COVID-19 pandemic.  Session was held in a private space ([address] [place] [address]) and she reported that she was alone.       [facility]/Clinician [address] [place] [address] Richie Mcarthur is a 74 y.o. female who presents for follow up of anxiety and stress (related to her marriage). She reported that she has been feeling better about the situation with her daughter and her first grandchild and her anxiety has been more manageable. 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 She stated that she had a talk with her daughter (and her daughter's ), as discussed with the [facility] in the previous session, about the conflicts they have been having related to the baby. She reported that the talk went relatively well and her daughter was receptive to what she said. She described some anxiety and minor conflicts she has had with her daughter over this first week of babysitting the grandchild and how she managed.   Benedict Torres also reported on an incident with her  that occurred about 2 months ago which has been causing her more stress. She stated that she found a storage unit receipt under 's name that was about a year old that he never told her about. She stated that finding out he hid another major expense made her feel extremely angry with him. She reported that she confronted him about this and he took her to the storage unit to find out what he had been keeping. She stated it was another \"junk piece of boat,\" similar to previous broken boats he has purchased and kept secret that he has never fixed. She reported that she warned him that this was the last time she would tolerate him keeping a major secret, especially one related to their finances, and that if she discovered another secret, their marriage would be unlikely to survive. She also warned him that there would be financial consequences to the choice he made purchasing or hiding this boat. She reported that she has been stressed about what next steps to take with her , especially to help protect her financial future given that her trust in her  in this has continued to deteriorate. Previous Recommendations:   1)  Pt will continue to engage in self-care activities, including practicing relaxation and deep breathing exercises, safely engaging in enjoyable activities in and outside of the house, and improving nutrition and eating habits. 2)  Pt will continue to practice interpersonal and coping skills with her . 3)  Pt will work on improving communication and observing boundaries with her daughter, including writing a letter to her daughter. 4)  Pt will be seen for a virtual follow-up by the [facility] in 3 weeks on 4/16/21 at 11AM.  Will further discuss her daughter as well as recent issues with her , which could not be addressed in today's session. MENTAL STATUS EXAM  Mood was anxious with congruent affect. Suicidal ideation was denied. Homicidal ideation was denied. Hygiene was good . Dress was appropriate.    Behavior

## 2021-04-30 DIAGNOSIS — Z76.0 MEDICATION REFILL: ICD-10-CM

## 2021-04-30 RX ORDER — HYDROCHLOROTHIAZIDE 50 MG/1
TABLET ORAL
Qty: 90 TABLET | Refills: 1 | Status: SHIPPED | OUTPATIENT
Start: 2021-04-30 | End: 2021-08-23 | Stop reason: SDUPTHER

## 2021-04-30 RX ORDER — LISINOPRIL 5 MG/1
TABLET ORAL
Qty: 90 TABLET | Refills: 1 | Status: SHIPPED | OUTPATIENT
Start: 2021-04-30 | End: 2021-08-20 | Stop reason: SDUPTHER

## 2021-04-30 RX ORDER — POTASSIUM CHLORIDE 750 MG/1
CAPSULE, EXTENDED RELEASE ORAL
Qty: 180 CAPSULE | Refills: 1 | Status: SHIPPED | OUTPATIENT
Start: 2021-04-30 | End: 2021-08-23 | Stop reason: SDUPTHER

## 2021-04-30 NOTE — TELEPHONE ENCOUNTER
Pt says she cannot take the tablets, it has to be the capsule. . it doesn't come up in a search so I reoredered it from med .           [facility] is calling to request a refill on the following medication(s):    Medication Request:  Requested Prescriptions     Pending Prescriptions Disp Refills    potassium chloride (MICRO-K) 10 MEQ extended release capsule 180 capsule 1     Sig: TAKE ONE CAPSULE BY MOUTH TWICE DAILY       Last Visit Date (If Applicable):  5/39/4680    Next Visit Date:    8/19/2021

## 2021-05-14 ENCOUNTER — VIRTUAL VISIT (OUTPATIENT)
Dept: BEHAVIORAL/MENTAL HEALTH CLINIC | Age: 66
End: 2021-05-14
Payer: MEDICARE

## 2021-05-14 DIAGNOSIS — F43.22 ADJUSTMENT DISORDER WITH ANXIETY: Primary | ICD-10-CM

## 2021-05-14 PROCEDURE — 90834 PSYTX W PT 45 MINUTES: CPT | Performed by: PSYCHOLOGIST

## 2021-05-14 PROCEDURE — 1036F TOBACCO NON-USER: CPT | Performed by: PSYCHOLOGIST

## 2021-05-14 NOTE — PROGRESS NOTES
Michael Alvarez,  Ph.D.   Licensed Clinical Psychologist ([phone])    Visit Date: 5/14/2021   Time of appointment:  11:15a - 12:03p   Time spent with Patient: 48 minutes. This is patient's 12th appointment. Reason for Consult:  Anxiety and Stress (relationship issues)     Referring Provider/PCP:    No ref. provider found  Danielle Doug, DO      Pt provided informed consent for the behavioral health program. Discussed with patient model of service to include the limits of confidentiality (i.e. abuse reporting, suicide intervention, etc.) and short-term intervention focused approach. Pt indicated understanding. Pt provided verbal consent to engage in telehealth psychotherapy.  This visit was completed virtually using doxy. me due to contact restrictions related to the COVID-19 pandemic.  Session was held in a private space ([address] [place] [address]) and she reported that she was alone.       [facility]/Clinician [address] [place] [address] Richie Olivares is a 74 y.o. female who presents for follow up of anxiety and stress (mostly related to relationship issues). She reported that she feels her overall mood over the past month has been generally \"good. \"  She reported that although she was concerned about her physical ability to be able to babysit her baby grandson 5 days a week for her daughter, she has actually done relatively well and has found it very emotionally rewarding. She stated, Valorie Gordon has really brought me some peace and jose and I'm so glad to have that. \"  She reported that although she provides most of the care, her  has been very actively involved in babysitting and she has enjoyed watching her  bond with their first grandchild. She reported that she has decided to return to working full-time in the fall, so she will be decreasing how much she babysits her grandson then.   In regards to managing her anxiety about her grandson and the conflict she experiences with her daughter over his care, she feels she has been managing this relatively well. She stated that she believes she and her daughter are figuring it out, but there are still many times she feels frustrated and hurt by her daughter's unwillingness to take her advice or guidance regarding the baby. She stated that when this happens, instead of arguing with her daughter, she \"cries it out\" after her daughter picks up the baby and she journals about her thoughts and feelings. She reported that she has found this to be very helpful in working through her emotions and preventing conflict with her daughter. She reported that in regards to the conflict with her  over their finances and him keeping secrets about his spending, she stated that she did have a conversation with him and set a firm date that they would be having a more thorough and open discussion about their finances and he agreed to participate. However, she stated that when she tried to get him to prepare his financial documents to share for their agreed meeting, he remained resistant to showing her anything and they ultimately failed to have the meeting, which was very frustrating and upsetting to her. She reported that he seemed open to the idea of meeting with a /adviser, so she will try this instead in the hopes that he'll be more forthcoming about his spending and finances if a third party is involved. Previous Recommendations:   1)  Pt will continue to engage in self-care activities, including practicing relaxation and deep breathing exercises, safely engaging in enjoyable activities in and outside of the house, and improving nutrition and eating habits. 2)  Pt will continue to practice interpersonal and coping skills with her . 3)  Pt will work on improving communication and observing boundaries with her daughter in regards to her grandbaby.   4)  Pt will be seen for a virtual depression, 15-19 = Moderately severe depression, 20-27 = Severe depression    How often pt has had thoughts of death or hurting self (if PHQ positive for depression):       No flowsheet data found. Interpretation of TOD-7 score: 5-9 = mild anxiety, 10-14 = moderate anxiety, 15+ = severe anxiety. Recommend referral to behavioral health for scores 10 or greater. DIAGNOSIS  Benedict Torres was seen today for anxiety and stress. Diagnoses and all orders for this visit:    Adjustment disorder with anxiety          INTERVENTION  Trained in strategies for increasing balanced thinking, Trained in improving communication skills, Discussed self-care (sleep, nutrition, rewarding activities, social support, exercise), Supportive techniques, Emphasized self-care as important for managing overall health, Identified maladaptive thoughts and Problem-solving re: financial issues with       PLAN  1)  Pt will continue to engage in self-care activities, including journaling, practicing relaxation and deep breathing exercises, safely engaging in enjoyable activities in and outside of the house, and improving nutrition and eating habits. 2)  Pt will continue to practice interpersonal and coping skills with her . 3)  Pt will continue to work on improving communication and observing boundaries with her daughter in regards to her grandbaby. 4)  Pt will be seen for a virtual follow-up by the [facility] in 4 weeks on 6/11/21 at 11AM.      INTERACTIVE COMPLEXITY  Is interactive complexity present?   No  Reason:  N/A  Additional Supporting Information:  N/A       Electronically signed by Earl Walker, PhD on 5/14/21 at 11:17 AM [email or web address]

## 2021-06-25 ENCOUNTER — VIRTUAL VISIT (OUTPATIENT)
Dept: BEHAVIORAL/MENTAL HEALTH CLINIC | Age: 66
End: 2021-06-25
Payer: MEDICARE

## 2021-06-25 DIAGNOSIS — F43.22 ADJUSTMENT DISORDER WITH ANXIETY: Primary | ICD-10-CM

## 2021-06-25 PROCEDURE — 90837 PSYTX W PT 60 MINUTES: CPT | Performed by: PSYCHOLOGIST

## 2021-06-25 PROCEDURE — 1036F TOBACCO NON-USER: CPT | Performed by: PSYCHOLOGIST

## 2021-06-25 NOTE — PROGRESS NOTES
maladaptive patterns, and the ability to use insight to inform behavior change. ASSESSMENT  Hoa Deleon presented to the appointment today for evaluation and treatment of symptoms of anxiety. She is currently deemed no risk to herself or others and meets criteria for Adjustment Disorder with anxiety. She continues to cope relatively well overall, but problems with her  over the past several weeks have been creating more distress. 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 She was encouraged to try celebrating 36 years of overcoming and surviving a challenging marriage, even without her , rather than not celebrating at all and focusing on the negative. Nguyen Pretty was in agreement with recommendations, and particularly liked the idea of still having a celebration and even just celebrating with her children instead. PHQ Scores 2/18/2021 2/18/2021 10/12/2020 9/3/2020 6/2/2020 2/21/2020 7/18/2019   PHQ2 Score 0 0 0 0 0 1 0   PHQ9 Score 0 0 0 0 0 1 0     Interpretation of Total Score Depression Severity: 1-4 = Minimal depression, 5-9 = Mild depression, 10-14 = Moderate depression, 15-19 = Moderately severe depression, 20-27 = Severe depression    How often pt has had thoughts of death or hurting self (if PHQ positive for depression):       No flowsheet data found. Interpretation of TOD-7 score: 5-9 = mild anxiety, 10-14 = moderate anxiety, 15+ = severe anxiety. Recommend referral to behavioral health for scores 10 or greater. DIAGNOSIS  Nguyen Pretty was seen today for anxiety and stress.     Diagnoses and all orders for this visit:    Adjustment disorder with anxiety          INTERVENTION  Trained in strategies for increasing balanced thinking, Trained in improving communication skills, Discussed self-care (sleep, nutrition, rewarding activities, social support, exercise), Supportive techniques, Emphasized self-care as important for managing overall health and CBT to target interpersonal conflicts      PLAN  1)  Pt will continue to engage in self-care activities, including journaling, practicing relaxation and deep breathing exercises, safely engaging in enjoyable activities in and outside of the house, and improving nutrition and eating habits. 2)  Pt will continue to practice interpersonal and coping skills with her  and find a way to still celebrate her 40th anniversary (even without her ). 3)  Pt will continue to work on improving communication and observing boundaries with her daughter in regards to her grandbaby as she adjusts to her new role as grandmother. 4)  Pt will be seen for a virtual follow-up by the [address] [place] [address] in 4 weeks on 7/23/21 at 11AM.      INTERACTIVE COMPLEXITY  Is interactive complexity present?   No  Reason:  N/A  Additional Supporting Information:  N/A       Electronically signed by Paty Crowder, PhD on 6/25/21 at 4:06 PM [email or web address]

## 2021-07-23 ENCOUNTER — VIRTUAL VISIT (OUTPATIENT)
Dept: BEHAVIORAL/MENTAL HEALTH CLINIC | Age: 66
End: 2021-07-23
Payer: MEDICARE

## 2021-07-23 DIAGNOSIS — F43.22 ADJUSTMENT DISORDER WITH ANXIETY: Primary | ICD-10-CM

## 2021-07-23 PROCEDURE — 1036F TOBACCO NON-USER: CPT | Performed by: PSYCHOLOGIST

## 2021-07-23 PROCEDURE — 90837 PSYTX W PT 60 MINUTES: CPT | Performed by: PSYCHOLOGIST

## 2021-07-23 NOTE — PROGRESS NOTES
Bihn Jo,  Ph.D.   Licensed Clinical Psychologist ([phone])    Visit Date: 7/23/2021   Time of appointment:  11:30a - 12:27p   Time spent with Patient: 57 minutes. This is patient's 14th appointment. Reason for Consult:  Anxiety and Stress (Relationship issues)     Referring Provider/PCP:    No ref. provider found  Sergio Cortes, DO      Pt provided informed consent for the behavioral health program. Discussed with patient model of service to include the limits of confidentiality (i.e. abuse reporting, suicide intervention, etc.) and short-term intervention focused approach. Pt indicated understanding. Pt provided verbal consent to engage in telehealth psychotherapy.  This visit was completed virtually mostly using doxy. me due to contact restrictions related to the COVID-19 pandemic.  Session was held in a private space (pts parked [address] , [facility]) and she reported that she was alone. Session did start several minutes late due to audio connection issues. The last 20 minutes of the session were completed by telephone due to losing the video connection completely.       [facility]/[address] Richie Crump is a 74 y.o. female who presents for follow up of anxiety and stress, mostly related to relationship issues with her . Pt reported that she did end up celebrating her wedding anniversary with her , as he asked her to go to dinner with him and she did not want to reject him. She stated that it went relatively well. However, after this event, they had a major fight on 7/4/21 that made her extremely angry and feel deeply betrayed and undermined by him. She had been so upset that [facility] that day and spoke about what happened, which was reviewed in today's session.   She reported that she feels much calmer now that some time has passed and that their relationship has been \"cordial.\" However, she admitted that she only speaks to him in a \"matter of fact\" way and has no interest in engaging with her  beyond basic interactions. She stated that her  has been somewhat nicer and has helped with a few things, but otherwise seems to be acting as if nothing happened on . Cathy Mahoney reported that she did attend the  appointment she scheduled for her and her , but she went alone as she did not want to risk her  only causing more difficulties. She reported that the advisor gave her some good advice. She stated that her  seems interested in attending the next appointment with the advisor, which she is open to letting him attend. Cathy Mahoney reported that she has mostly given up on her  ever being willing or able to make improvements in their marriage. She stated that she almost feels like she has a  who  because she has had to figure out everything on her own without any support or help from a partner. She reported that she is still is not sure if she wants to continue the marriage and just co-exist in their home or if she would prefer to separate. However, she stated that she is working on stabilizing her own financial future with or without her . She reported that she is also preparing to have another conversation with him about their home and their finances and will practice asserting the boundaries and limits she has discussed with the [facility]. Outside of the stress in her marriage, Cathy Mahoney reported that the relationship with her daughter is going well. She stated that she has also continued \"living my best life\" in spite of problems with her  by continuing to work, spend time with friends, going to Buddhist, and engaging in other enjoyable and meaningful activities.        Previous Recommendations:   1)  Pt will continue to engage in self-care activities, including journaling, practicing relaxation and deep breathing exercises, safely engaging in enjoyable activities in and outside of the house, and improving nutrition and eating habits. 2)  Pt will continue to practice interpersonal and coping skills with her  and find a way to still celebrate her 40th anniversary (even without her ). 3)  Pt will continue to work on improving communication and observing boundaries with her daughter in regards to her grandbaby as she adjusts to her new role as grandmother. 4)  Pt will be seen for a virtual follow-up by the [facility] in 4 weeks on 7/23/21 at 11AM.        MENTAL STATUS EXAM  Mood was anxious with calm affect and some tearfulness. Suicidal ideation was denied. Homicidal ideation was denied. Hygiene was good . Dress was appropriate. Behavior was Within Normal Limits with No observation or self-report of difficulties ambulating. Attitude was Cooperative. Eye-contact was good. Speech: rate - WNL, rhythm - WNL, volume - WNL. Verbalizations were goal directed and coherent. Thought processes were intact and goal-oriented without evidence of delusions, hallucinations, obsessions, or caleb; with no cognitive distortions. Associations were characterized by intact cognitive processes. Pt was orientated oriented to person, place, time, and general circumstances;  recent:  good. Insight and judgment were estimated to be good, AEB, a good  understanding of cyclical maladaptive patterns, and the ability to use insight to inform behavior change. ASSESSMENT  Ramona Naranjo presented to the appointment today for evaluation and treatment of symptoms of anxiety and stress, mostly related to marital problems. She is currently deemed no risk to herself or others and meets criteria for Adjustment Disorder with anxiety. She responded very well to interventions, including providing emotional support and validation and engaging in problem solving regarding conflict with her .  Darvin Zarate was in agreement with recommendations. PHQ Scores 2/18/2021 2/18/2021 10/12/2020 9/3/2020 6/2/2020 2/21/2020 7/18/2019   PHQ2 Score 0 0 0 0 0 1 0   PHQ9 Score 0 0 0 0 0 1 0     Interpretation of Total Score Depression Severity: 1-4 = Minimal depression, 5-9 = Mild depression, 10-14 = Moderate depression, 15-19 = Moderately severe depression, 20-27 = Severe depression    How often pt has had thoughts of death or hurting self (if PHQ positive for depression):       No flowsheet data found. Interpretation of TOD-7 score: 5-9 = mild anxiety, 10-14 = moderate anxiety, 15+ = severe anxiety. Recommend referral to behavioral health for scores 10 or greater. DIAGNOSIS  Lukas Alexander was seen today for anxiety and stress. Diagnoses and all orders for this visit:    Adjustment disorder with anxiety          INTERVENTION  Practiced assertive communication, Trained in strategies for increasing balanced thinking, Discussed self-care (sleep, nutrition, rewarding activities, social support, exercise), Supportive techniques, Emphasized self-care as important for managing overall health and Problem-solving re: managing conflict with her . PLAN  1)  Pt will continue to engage in self-care activities, including journaling, practicing relaxation and deep breathing exercises, safely engaging in enjoyable activities in and outside of the house, and improving nutrition and eating habits. 2)  Pt will continue to practice interpersonal and coping skills with her , including setting firm boundaries and limits. 3)  Pt will continue to work on improving communication and observing boundaries with her daughter in regards to her grandbaby as she adjusts to her new role as grandmother. 4)  Pt will be seen for a virtual follow-up by the [address] [place] [address] in 4 weeks on 8/20/21 at 11AM.      INTERACTIVE COMPLEXITY  Is interactive complexity present?   No  Reason:  N/A  Additional Supporting Information:  N/A       Electronically signed by Joe Muñoz

## 2021-08-16 ENCOUNTER — TELEPHONE (OUTPATIENT)
Dept: FAMILY MEDICINE CLINIC | Age: 66
End: 2021-08-16

## 2021-08-16 NOTE — TELEPHONE ENCOUNTER
----- Message from Barry Wong sent at 8/16/2021  1:33 PM EDT -----  Subject: Message to Provider    QUESTIONS  Information for Provider? Pt would like for their lab orders sent to   [facility] to be completed prior to their appt scheduled with their   pcp 8/19/2021. please advise  ---------------------------------------------------------------------------  --------------  CALL BACK INFO  What is the best way for the office to contact you? OK to leave message on   voicemail  Preferred Call Back Phone Number? [phone]  ---------------------------------------------------------------------------  --------------  SCRIPT ANSWERS  Relationship to Patient?  Self

## 2021-08-17 LAB
ALBUMIN SERPL-MCNC: 4.1 G/DL
ALT SERPL-CCNC: 34 U/L
AST SERPL-CCNC: 31 U/L
AVERAGE GLUCOSE: 117
BASOPHILS ABSOLUTE: NORMAL
BASOPHILS RELATIVE PERCENT: NORMAL
BUN / CREAT RATIO: NORMAL
BUN BLDV-MCNC: 13 MG/DL
CALCIUM SERPL-MCNC: 9.5 MG/DL
CHLORIDE BLD-SCNC: 101 MMOL/L
CHOLESTEROL, TOTAL: 188 MG/DL
CHOLESTEROL/HDL RATIO: 3.7
CO2: 28 MMOL/L
CREAT SERPL-MCNC: 0.74 MG/DL
EOSINOPHILS ABSOLUTE: NORMAL
EOSINOPHILS RELATIVE PERCENT: NORMAL
GLUCOSE: 92
HBA1C MFR BLD: 5.7 %
HCT VFR BLD CALC: 39.4 % (ref 36–46)
HDLC SERPL-MCNC: 51 MG/DL (ref 35–70)
HEMOGLOBIN: 13.4 G/DL (ref 12–16)
LDL CHOLESTEROL CALCULATED: 110 MG/DL (ref 0–160)
LYMPHOCYTES ABSOLUTE: NORMAL
LYMPHOCYTES RELATIVE PERCENT: NORMAL
MAGNESIUM: 1.9 MG/DL
MCH RBC QN AUTO: NORMAL PG
MCHC RBC AUTO-ENTMCNC: NORMAL G/DL
MCV RBC AUTO: NORMAL FL
MONOCYTES ABSOLUTE: NORMAL
MONOCYTES RELATIVE PERCENT: NORMAL
NEUTROPHILS ABSOLUTE: NORMAL
NEUTROPHILS RELATIVE PERCENT: NORMAL
NONHDLC SERPL-MCNC: NORMAL MG/DL
PDW BLD-RTO: NORMAL %
PHOSPHORUS: 2.8 MG/DL
PLATELET # BLD: NORMAL 10*3/UL
PMV BLD AUTO: NORMAL FL
POTASSIUM SERPL-SCNC: 3.6 MMOL/L
RBC # BLD: NORMAL 10*6/UL
SODIUM BLD-SCNC: 139 MMOL/L
TRIGL SERPL-MCNC: 133 MG/DL
TSH SERPL DL<=0.05 MIU/L-ACNC: 2.07 UIU/ML
VLDLC SERPL CALC-MCNC: 27 MG/DL
WBC # BLD: NORMAL 10*3/UL

## 2021-08-20 ENCOUNTER — VIRTUAL VISIT (OUTPATIENT)
Dept: BEHAVIORAL/MENTAL HEALTH CLINIC | Age: 66
End: 2021-08-20
Payer: MEDICARE

## 2021-08-20 DIAGNOSIS — F43.22 ADJUSTMENT DISORDER WITH ANXIETY: Primary | ICD-10-CM

## 2021-08-20 DIAGNOSIS — Z76.0 MEDICATION REFILL: ICD-10-CM

## 2021-08-20 PROCEDURE — 1036F TOBACCO NON-USER: CPT | Performed by: PSYCHOLOGIST

## 2021-08-20 PROCEDURE — 90837 PSYTX W PT 60 MINUTES: CPT | Performed by: PSYCHOLOGIST

## 2021-08-20 RX ORDER — LISINOPRIL 5 MG/1
TABLET ORAL
Qty: 90 TABLET | Refills: 0 | Status: SHIPPED | OUTPATIENT
Start: 2021-08-20 | End: 2021-11-03

## 2021-08-20 NOTE — PROGRESS NOTES
Allyson Baird,  Ph.D.   Licensed Clinical Psychologist ([phone])    Visit Date: 8/20/2021   Time of appointment:  11:23a - 12:20p   Time spent with Patient: 57 minutes. This is patient's 15th appointment. Reason for Consult:  Stress and Anxiety     Referring Provider/PCP:    No ref. provider found  Gail Bullard DO      Pt provided informed consent for the behavioral health program. Discussed with patient model of service to include the limits of confidentiality (i.e. abuse reporting, suicide intervention, etc.) and short-term intervention focused approach. Pt indicated understanding. Pt provided verbal consent to engage in telehealth psychotherapy.  This visit was completed virtually using doxy. me due to contact restrictions related to the COVID-19 pandemic.  Session was held in a private space ([address]) and she reported that she was alone. Session did start several minutes late due to connection issues.       [facility]/[address] DrMelani Prieto is a 74 y.o. female who presents for follow up of anxiety and stress, mostly related to relationship issues with her . She reported that she has continued to struggle with the distress and conflict her  has been causing. She described recent conflicts with him that have been extremely upsetting. She stated, \"I'm really surmising that it's never going to change with him, that he's never going to change. \"  She stated that there were a few positive \"twinkle\" moments with her , but not enough to outweigh how much distress he has caused. She reported that she did confront her  about whether or not he wants to stay in their marriage and despite him talking along time to respond, he eventually told her that he did want to stay .   She stated that she told him that if he wants to stay together, then they both need to make changes, be more willing to make compromises, work on building a friendship, and be more respectful. She reported that her  responded to this by telling her that he feels like they can stay  as long as she does not challenge him and acts less like the man in the relationship and more like a woman. Papi Coy stated that she tried to tell him that not challenging each other is impossible and is even a healthy part of a relationship, but she does not believe he is able to understand this. She reported that is truly questioning if she can remain in the marriage, which has left her feeling sad and lonely. Previous Recommendations:   1)  Pt will continue to engage in self-care activities, including journaling, practicing relaxation and deep breathing exercises, safely engaging in enjoyable activities in and outside of the house, and improving nutrition and eating habits. 2)  Pt will continue to practice interpersonal and coping skills with her , including setting firm boundaries and limits. 3)  Pt will continue to work on improving communication and observing boundaries with her daughter in regards to her grandbaby as she adjusts to her new role as grandmother. 4)  Pt will be seen for a virtual follow-up by the [facility] in 4 weeks on 8/20/21 at 11AM.        MENTAL STATUS EXAM  Mood was anxious with tearful affect. Suicidal ideation was denied. Homicidal ideation was denied. Hygiene was good . Dress was appropriate. Behavior was Within Normal Limits with No self-report of difficulties ambulating. Attitude was Cooperative. Eye-contact was good. Speech: rate - WNL, rhythm - WNL, volume - WNL. Verbalizations were goal directed and coherent. Thought processes were intact and goal-oriented without evidence of delusions, hallucinations, obsessions, or caleb; with no cognitive distortions. Associations were characterized by intact cognitive processes.   Pt was orientated oriented to person, place, time, and general circumstances;  recent:  good. Insight and judgment were estimated to be good, AEB, a good  understanding of cyclical maladaptive patterns, and the ability to use insight to inform behavior change. ASSESSMENT  Emiliano Limon presented to the appointment today for evaluation and treatment of symptoms of anxiety and stress, mostly related to marital problems. She is currently deemed no risk to herself or others and meets criteria for Adjustment Disorder. She responded well to interventions, including providing her empathic support and validation and discussing ways to better manage the conflict with and distressed caused by her . The pros and cons of staying  verus ending the relationship were also gently examined and discussed. Akiko Delgado was in agreement with recommendations. PHQ Scores 8/23/2021 2/18/2021 2/18/2021 10/12/2020 9/3/2020 6/2/2020 2/21/2020   PHQ2 Score - 0 0 0 0 0 1   PHQ9 Score 0 0 0 0 0 0 1     Interpretation of Total Score Depression Severity: 1-4 = Minimal depression, 5-9 = Mild depression, 10-14 = Moderate depression, 15-19 = Moderately severe depression, 20-27 = Severe depression    How often pt has had thoughts of death or hurting self (if PHQ positive for depression):       No flowsheet data found. Interpretation of TOD-7 score: 5-9 = mild anxiety, 10-14 = moderate anxiety, 15+ = severe anxiety. Recommend referral to behavioral health for scores 10 or greater. DIAGNOSIS  Akiko Delgado was seen today for stress and anxiety.     Diagnoses and all orders for this visit:    Adjustment disorder with anxiety          INTERVENTION  Practiced assertive communication, Trained in strategies for increasing balanced thinking, Discussed self-care (sleep, nutrition, rewarding activities, social support, exercise), Supportive techniques, Emphasized self-care as important for managing overall health and Problem-solving re: managing conflict with       PLAN  1)  Pt will continue to engage in self-care activities, including journaling, practicing relaxation and deep breathing exercises, safely engaging in enjoyable activities in and outside of the house, and improving nutrition and eating habits. 2)  Pt will continue to practice interpersonal and coping skills with her , including setting firm boundaries and limits. 3)  Pt will continue to work on improving communication and observing boundaries with her daughter in regards to her grandbaby as she adjusts to her new role as grandmother. 4)  Pt will be seen for a virtual follow-up by the [facility] in 4 weeks on 9/21/21 at 2PM.      INTERACTIVE COMPLEXITY  Is interactive complexity present?   No  Reason:  N/A  Additional Supporting Information:  N/A       Electronically signed by Maricel Turcios, PhD on 8/20/21 at 11:49 AM [email or web address]

## 2021-08-23 ENCOUNTER — OFFICE VISIT (OUTPATIENT)
Dept: FAMILY MEDICINE CLINIC | Age: 66
End: 2021-08-23
Payer: MEDICARE

## 2021-08-23 VITALS
SYSTOLIC BLOOD PRESSURE: 138 MMHG | TEMPERATURE: 97.5 F | BODY MASS INDEX: 43.98 KG/M2 | HEART RATE: 83 BPM | OXYGEN SATURATION: 98 % | DIASTOLIC BLOOD PRESSURE: 86 MMHG | WEIGHT: 225.2 LBS

## 2021-08-23 DIAGNOSIS — Z76.0 MEDICATION REFILL: ICD-10-CM

## 2021-08-23 DIAGNOSIS — R73.9 HYPERGLYCEMIA: ICD-10-CM

## 2021-08-23 DIAGNOSIS — Z71.3 DIETARY COUNSELING: ICD-10-CM

## 2021-08-23 DIAGNOSIS — J41.0 SIMPLE CHRONIC BRONCHITIS (HCC): ICD-10-CM

## 2021-08-23 DIAGNOSIS — I10 HTN, GOAL BELOW 130/80: Primary | ICD-10-CM

## 2021-08-23 DIAGNOSIS — E78.5 DYSLIPIDEMIA: ICD-10-CM

## 2021-08-23 PROCEDURE — G8417 CALC BMI ABV UP PARAM F/U: HCPCS | Performed by: FAMILY MEDICINE

## 2021-08-23 PROCEDURE — G8926 SPIRO NO PERF OR DOC: HCPCS | Performed by: FAMILY MEDICINE

## 2021-08-23 PROCEDURE — G8427 DOCREV CUR MEDS BY ELIG CLIN: HCPCS | Performed by: FAMILY MEDICINE

## 2021-08-23 PROCEDURE — 3017F COLORECTAL CA SCREEN DOC REV: CPT | Performed by: FAMILY MEDICINE

## 2021-08-23 PROCEDURE — 1123F ACP DISCUSS/DSCN MKR DOCD: CPT | Performed by: FAMILY MEDICINE

## 2021-08-23 PROCEDURE — 1036F TOBACCO NON-USER: CPT | Performed by: FAMILY MEDICINE

## 2021-08-23 PROCEDURE — 99214 OFFICE O/P EST MOD 30 MIN: CPT | Performed by: FAMILY MEDICINE

## 2021-08-23 PROCEDURE — 4040F PNEUMOC VAC/ADMIN/RCVD: CPT | Performed by: FAMILY MEDICINE

## 2021-08-23 PROCEDURE — 1090F PRES/ABSN URINE INCON ASSESS: CPT | Performed by: FAMILY MEDICINE

## 2021-08-23 PROCEDURE — G8400 PT W/DXA NO RESULTS DOC: HCPCS | Performed by: FAMILY MEDICINE

## 2021-08-23 PROCEDURE — 3023F SPIROM DOC REV: CPT | Performed by: FAMILY MEDICINE

## 2021-08-23 RX ORDER — HYDROCHLOROTHIAZIDE 50 MG/1
TABLET ORAL
Qty: 5 TABLET | Refills: 0 | Status: SHIPPED | OUTPATIENT
Start: 2021-08-23 | End: 2021-11-03

## 2021-08-23 RX ORDER — LISINOPRIL 5 MG/1
5 TABLET ORAL DAILY
Qty: 5 TABLET | Refills: 0 | Status: SHIPPED | OUTPATIENT
Start: 2021-08-23 | End: 2021-09-21

## 2021-08-23 RX ORDER — LATANOPROST 50 UG/ML
SOLUTION/ DROPS OPHTHALMIC NIGHTLY
COMMUNITY
Start: 2021-07-01

## 2021-08-23 RX ORDER — POTASSIUM CHLORIDE 750 MG/1
CAPSULE, EXTENDED RELEASE ORAL
Qty: 10 CAPSULE | Refills: 0 | Status: SHIPPED | OUTPATIENT
Start: 2021-08-23 | End: 2021-11-03

## 2021-08-23 SDOH — ECONOMIC STABILITY: FOOD INSECURITY: WITHIN THE PAST 12 MONTHS, THE FOOD YOU BOUGHT JUST DIDN'T LAST AND YOU DIDN'T HAVE MONEY TO GET MORE.: NEVER TRUE

## 2021-08-23 SDOH — ECONOMIC STABILITY: FOOD INSECURITY: WITHIN THE PAST 12 MONTHS, YOU WORRIED THAT YOUR FOOD WOULD RUN OUT BEFORE YOU GOT MONEY TO BUY MORE.: NEVER TRUE

## 2021-08-23 ASSESSMENT — SOCIAL DETERMINANTS OF HEALTH (SDOH): HOW HARD IS IT FOR YOU TO PAY FOR THE VERY BASICS LIKE FOOD, HOUSING, MEDICAL CARE, AND HEATING?: NOT HARD AT ALL

## 2021-08-23 ASSESSMENT — PATIENT HEALTH QUESTIONNAIRE - PHQ9
SUM OF ALL RESPONSES TO PHQ QUESTIONS 1-9: 0
1. LITTLE INTEREST OR PLEASURE IN DOING THINGS: 0

## 2021-08-23 NOTE — PATIENT INSTRUCTIONS

## 2021-08-23 NOTE — PROGRESS NOTES
latanoprost (XALATAN) 0.005 % ophthalmic solution nightly       No current facility-administered medications for this visit. Allergies: Allergies   Allergen Reactions    Daypro [Oxaprozin]     Tetracyclines & Related     Erythromycin Rash        Medical History:     Past Medical History:   Diagnosis Date    Asthma     Hypertension     OA (osteoarthritis)     Obesity        Past Surgical History:   Procedure Laterality Date     SECTION      x2    CHOLECYSTECTOMY      COLONOSCOPY      ; ; due     EAR SURGERY Left        Family History   Problem Relation Age of Onset    Asthma Mother     Liver Cancer Father     Hypertension Father         Social History:     Social History     Socioeconomic History    Marital status:      Spouse name: Not on file    Number of children: Not on file    Years of education: Not on file    Highest education level: Not on file   Occupational History    Not on file   Tobacco Use    Smoking status: Never Smoker    Smokeless tobacco: Never Used   Vaping Use    Vaping Use: Never used   Substance and Sexual Activity    Alcohol use: Yes     Comment: rare     Drug use: No    Sexual activity: Not Currently     Partners: Male   Other Topics Concern    Not on file   Social History Narrative    Not on file     Social Determinants of Health     Financial Resource Strain: Low Risk     Difficulty of Paying Living Expenses: Not hard at all   Food Insecurity: No Food Insecurity    Worried About Running Out of Food in the Last Year: Never true    [address] in the Last Year: Never true   Transportation Needs:     Lack of Transportation (Medical):      Lack of Transportation (Non-Medical):    Physical Activity:     Days of Exercise per Week:     Minutes of Exercise per Session:    Stress:     Feeling of Stress :    Social Connections:     Frequency of Communication with Friends and Family:     Frequency of Social Gatherings with Friends and Family:     Attends Jehovah's witness Services:     Active Member of Clubs or Organizations:     Attends Club or Organization Meetings:     Marital Status:    Intimate Partner Violence:     Fear of Current or Ex-Partner:     Emotionally Abused:     Physically Abused:     Sexually Abused:         ROS:     Constitutional: No fevers, chills, fatigue. ENT: No nasal congestion or sore throat  Respiratory: No difficulty in breathing or cough. Cardiovascular: No chest pain, palpitations or shortness of breath  Gastrointestinal: No abdominal pain or change in bowel movements. Genitourinary: No change in urinary frequency or dysuria. Skin: No rashes or skin lesions. Neurological: No weakness. No headaches. Last Filed Vitals:  /86   Pulse 83   Temp 97.5 °F (36.4 °C) (Temporal)   Wt 225 lb 3.2 oz (102.2 kg)   SpO2 98%   BMI 43.98 kg/m²      Physical Examination:     GENERAL APPEARANCE: in no acute distress, well developed, well nourished. HEAD: normocephalic, atraumatic. EYES: extraocular movement intact (EOMI), pupils equal, round, reactive to light and accommodation. EARS: normal, tympanic membrane intact, clear, auditory canal clear. NOSE: nares patent, no erythema, sinuses nontender bilaterally, no rhinorrhea. ORAL CAVITY: mucosa moist, no lesions. THROAT: clear, no mass, no exudate. NECK/THYROID: neck supple, full range of motion, no thyromegaly. HEART: no murmurs, regular rate and rhythm, S1, S2 normal.   LUNGS: clear to auscultation bilaterally, no wheezes, rales, rhonchi.    ABDOMEN: normal, bowel sounds present, soft, nontender, nondistended, no rebound guarding or rigidity    Recent Labs/ In Office Testing/ Radiograph review:     Hospital Outpatient Visit on 10/14/2020   Component Date Value Ref Range Status    SARS-CoV-2, RUSTY 10/14/2020 Detected* Not Detected Final    Comment: (NOTE)  This nucleic acid amplification test was developed and its  performance characteristics determined by [facility]. Nucleic acid amplification tests include PCR and TMA. This test has  not been FDA cleared or approved. This test has been authorized by  FDA under an Emergency Use Authorization (EUA). This test is only  authorized for the duration of time the declaration that  circumstances exist justifying the authorization of the emergency use  of in vitro diagnostic tests for detection of SARS-CoV-2 virus and/or  diagnosis of COVID-19 infection under section 564(b)(1) of the Act,  21 U. S.C. 927FLO-0(O) (1), unless the authorization is terminated or  revoked sooner. When diagnostic testing is negative, the possibility of a false  negative result should be considered in the context of a patient's  recent exposures and the presence of clinical signs and symptoms  consistent with COVID-19. An individual without symptoms of COVID-  19 and who is not shedding SARS-CoV-2 vi                           brenda would expect to have a  negative (not detected) result in this assay. Performed At: [address] [id number]  Alisha Wood MD TA:[id number]         No results found for this visit on 08/23/21. Assessment/Plan:     Silver Reich was seen today for hypertension, hyperlipidemia and hyperglycemia. Diagnoses and all orders for this visit:    HTN, goal below 130/80  -     Magnesium; Future  -     Renal Function Panel; Future  -     [facility]    Dyslipidemia  -     ALT; Future  -     AST; Future  -     CBC Auto Differential; Future  -     Lipid Panel;  Future  -     [facility]    Hyperglycemia  -     Hemoglobin A1C; Future  -     [place]    BMI 40.0-44.9, adult [facility])  -     [facility]    Simple chronic bronchitis [facility])    Dietary counseling  -      BMI ABOVE NORMAL F/U    Medication

## 2021-08-24 DIAGNOSIS — R73.9 HYPERGLYCEMIA: ICD-10-CM

## 2021-08-24 DIAGNOSIS — I10 HTN, GOAL BELOW 130/80: ICD-10-CM

## 2021-08-24 DIAGNOSIS — E78.5 DYSLIPIDEMIA: ICD-10-CM

## 2021-09-20 ENCOUNTER — TELEPHONE (OUTPATIENT)
Dept: FAMILY MEDICINE CLINIC | Age: 66
End: 2021-09-20

## 2021-09-20 NOTE — TELEPHONE ENCOUNTER
----- Message from Manish Napier sent at 9/20/2021  8:49 AM EDT -----  Subject: Message to Provider    QUESTIONS  Information for Provider? Patient stated she had a fall this weekend , she   is walking fine but is having some pain in her leg and back soreness. Patient stated she wanted to know if PCP recommends she come in for an   appointment or not.  ---------------------------------------------------------------------------  --------------  [address]  What is the best way for the office to contact you? OK to leave message on   voicemail  Preferred Call Back Phone Number? [phone]  ---------------------------------------------------------------------------  --------------  SCRIPT ANSWERS  Relationship to Patient?  Self

## 2021-09-21 ENCOUNTER — OFFICE VISIT (OUTPATIENT)
Dept: FAMILY MEDICINE CLINIC | Age: 66
End: 2021-09-21
Payer: MEDICARE

## 2021-09-21 ENCOUNTER — VIRTUAL VISIT (OUTPATIENT)
Dept: BEHAVIORAL/MENTAL HEALTH CLINIC | Age: 66
End: 2021-09-21
Payer: MEDICARE

## 2021-09-21 VITALS
DIASTOLIC BLOOD PRESSURE: 72 MMHG | SYSTOLIC BLOOD PRESSURE: 124 MMHG | WEIGHT: 223 LBS | TEMPERATURE: 97.1 F | BODY MASS INDEX: 43.55 KG/M2 | HEART RATE: 87 BPM | OXYGEN SATURATION: 97 %

## 2021-09-21 DIAGNOSIS — M79.661 BILATERAL CALF PAIN: ICD-10-CM

## 2021-09-21 DIAGNOSIS — F43.22 ADJUSTMENT DISORDER WITH ANXIETY: Primary | ICD-10-CM
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
## 2021-09-21 DIAGNOSIS — M79.662 BILATERAL CALF PAIN: ICD-10-CM

## 2021-09-21 DIAGNOSIS — M54.50 ACUTE MIDLINE LOW BACK PAIN WITHOUT SCIATICA: ICD-10-CM

## 2021-09-21 PROCEDURE — 90834 PSYTX W PT 45 MINUTES: CPT | Performed by: PSYCHOLOGIST

## 2021-09-21 PROCEDURE — G8400 PT W/DXA NO RESULTS DOC: HCPCS | Performed by: FAMILY MEDICINE

## 2021-09-21 PROCEDURE — 1036F TOBACCO NON-USER: CPT | Performed by: FAMILY MEDICINE

## 2021-09-21 PROCEDURE — G8417 CALC BMI ABV UP PARAM F/U: HCPCS | Performed by: FAMILY MEDICINE

## 2021-09-21 PROCEDURE — 1123F ACP DISCUSS/DSCN MKR DOCD: CPT | Performed by: FAMILY MEDICINE

## 2021-09-21 PROCEDURE — 3017F COLORECTAL CA SCREEN DOC REV: CPT | Performed by: FAMILY MEDICINE

## 2021-09-21 PROCEDURE — G8427 DOCREV CUR MEDS BY ELIG CLIN: HCPCS | Performed by: FAMILY MEDICINE

## 2021-09-21 PROCEDURE — 1036F TOBACCO NON-USER: CPT | Performed by: PSYCHOLOGIST

## 2021-09-21 PROCEDURE — 4040F PNEUMOC VAC/ADMIN/RCVD: CPT | Performed by: FAMILY MEDICINE

## 2021-09-21 PROCEDURE — 99213 OFFICE O/P EST LOW 20 MIN: CPT | Performed by: FAMILY MEDICINE

## 2021-09-21 PROCEDURE — 1090F PRES/ABSN URINE INCON ASSESS: CPT | Performed by: FAMILY MEDICINE

## 2021-09-21 RX ORDER — METHYLPREDNISOLONE 4 MG/1
TABLET ORAL
Qty: 1 KIT | Refills: 0 | Status: SHIPPED | OUTPATIENT
Start: 2021-09-21 | End: 2021-09-27

## 2021-09-21 RX ORDER — CYCLOBENZAPRINE HCL 10 MG
10 TABLET ORAL NIGHTLY PRN
Qty: 10 TABLET | Refills: 0 | Status: SHIPPED | OUTPATIENT
Start: 2021-09-21 | End: 2021-10-01

## 2021-09-21 ASSESSMENT — PATIENT HEALTH QUESTIONNAIRE - PHQ9
SUM OF ALL RESPONSES TO PHQ QUESTIONS 1-9: 0
1. LITTLE INTEREST OR PLEASURE IN DOING THINGS: 0
SUM OF ALL RESPONSES TO PHQ QUESTIONS 1-9: 0
2. FEELING DOWN, DEPRESSED OR HOPELESS: 0
SUM OF ALL RESPONSES TO PHQ9 QUESTIONS 1 & 2: 0
SUM OF ALL RESPONSES TO PHQ QUESTIONS 1-9: 0

## 2021-09-21 NOTE — PROGRESS NOTES
Mukesh Peacock,  Ph.D.   Licensed Clinical Psychologist ([phone])    Visit Date: 9/21/2021   Time of appointment:  2:20p - 3:11p   Time spent with Patient: 51 minutes. This is patient's 16th appointment. Reason for Consult:  Stress and Anxiety     Referring Provider/PCP:    No ref. provider found  Power Nails DO      Pt provided informed consent for the behavioral health program. Discussed with patient model of service to include the limits of confidentiality (i.e. abuse reporting, suicide intervention, etc.) and short-term intervention focused approach. Pt indicated understanding. Pt provided verbal consent to engage in telehealth psychotherapy.  This visit was completed virtually using doxy. me due to contact restrictions related to the COVID-19 pandemic.  Session was held in a private space ([address] [place] [address]) and she reported that she was alone.       [facility]/Clinician Location: [facility]; Gordon Hernandez is a 74 y.o. female who presents for follow up of stress and anxiety, mostly related to relationship issues with her . She reported that her  has continued to be a main source of distress. She stated that they had another Anastacia incident\" last week when Brandin Sung wanted to clear out the garage with her , who had agreed to help. She reported that she told him multiple times of her plans to clean the garage, but on the day the  came to help her clean, he blocked them from entering the garage and became verbally \"beligerant. \"  She stated that she was extremely embarrassed and ashamed by the way he spoke with the  and even warned her  she would call the police if he threatened them or tried to hurt them. She reported that she was so upset she asked him to leave the house (the second time she has done this), though he didn't even attempt to leave this time.   She reported that she warned him again that she plans to clean the garage no matter what and that he does not want her to go in there, then he needs to start clearing out some items because she will be ordering a dumpster to come out this week. She stated that over the past few days he has been working on clearing out the garage and has put several items in their driveway, though she is not sure if these are things he is planning to put in the dumpster. She stated that she is hoping to get the dumpster out later this week and that once her  sees it, he will be more cooperative in getting the garage cleared out. Shereen reported that another incident happened over this weekend in which her  was \"careless\" and spilled some water on the floor without cleaning it up. She stated that later that night, with little light on in the room, she ended up slipping on the wet floor and falling relatively hard. She reported that her  came to help her up, but she was so angry at him that she yelled at him to stay away from her. She reported that she began to cry for a little while out of anger and frustration with her  as well as from the pain of the fall. She stated that since this fall, he has actually been much nicer to her and she is hoping that he will be more cooperative with the garage cleaning as a result. However, she noted that she is not expecting any long-term meaningful change from her . She stated that she told him that they need to have another talk about his behavior and how he has been hurtful to her. She reported that she is \"reaching her limit\" and is not sure if their marriage can be saved or not. She reported that she is continuing to figure out her finances so she can make a more informed decision regarding what step to take next in regards to her marriage.        Previous Recommendations:   1)  Pt will continue to engage in self-care activities, including journaling, practicing relaxation and deep throwing things away. Bang Anderson was in agreement with recommendations. PHQ Scores 9/21/2021 8/23/2021 2/18/2021 2/18/2021 10/12/2020 9/3/2020 6/2/2020   PHQ2 Score 0 - 0 0 0 0 0   PHQ9 Score 0 0 0 0 0 0 0     Interpretation of Total Score Depression Severity: 1-4 = Minimal depression, 5-9 = Mild depression, 10-14 = Moderate depression, 15-19 = Moderately severe depression, 20-27 = Severe depression    How often pt has had thoughts of death or hurting self (if PHQ positive for depression):       No flowsheet data found. Interpretation of TOD-7 score: 5-9 = mild anxiety, 10-14 = moderate anxiety, 15+ = severe anxiety. Recommend referral to behavioral health for scores 10 or greater. DIAGNOSIS  Bang Anderson was seen today for stress and anxiety. Diagnoses and all orders for this visit:    Adjustment disorder with anxiety          INTERVENTION  Practiced assertive communication, Trained in strategies for increasing balanced thinking, Trained in improving communication skills, Discussed self-care (sleep, nutrition, rewarding activities, social support, exercise), Supportive techniques, Emphasized self-care as important for managing overall health and Problem-solving re: managing conflict with       PLAN  1)  Pt will continue to engage in self-care activities, including journaling, practicing relaxation and deep breathing exercises, safely engaging in enjoyable activities in and outside of the house, and improving nutrition and eating habits. 2)  Pt will continue to practice interpersonal and coping skills with her , including setting firm boundaries and limits. 3)  Pt will continue to work on improving communication and observing boundaries with her daughter in regards to her grandbaby as she adjusts to her new role as grandmother. 4)  Pt will be seen for a virtual follow-up by the [facility] in 4 weeks on 10/19/21 at 2PM.      INTERACTIVE COMPLEXITY  Is interactive complexity present?   No  Reason: N/A  Additional Supporting Information:  N/A       Electronically signed by Mejia Portillo, PhD on 9/21/21 at 2:37 PM EDT

## 2021-09-21 NOTE — PROGRESS NOTES
Progress Note    Ramona Naranjo is a 74 y.o. female who presents today alone for evaluation of   Chief Complaint   Patient presents with    Back Pain     slipped on water on the floor 3 days ago, low back pain    Leg Pain     b/l calf pain           HPI:   Patient is here for acute visit today after fall last Saturday. Patient reports mechanical fall and slip on water in her kitchen. Patient states she fell on her left side. Patient states the majority of her pain is located on her left elbow, low back, and posterior calf. Patient states she has been taking OTC analgesics with only some relief. Patient denies weakness, numbness, or tingling. Health Maintenance Due   Topic Date Due    Shingles Vaccine (1 of 2) Never done   [facility] Annual Wellness Visit (AWV)  Never done    Pneumococcal 65+ years Vaccine (2 of 2 - PPSV23) 07/19/2021    Flu vaccine (1) 09/01/2021        Current Medications:     Current Outpatient Medications   Medication Sig Dispense Refill    methylPREDNISolone (MEDROL DOSEPACK) 4 MG tablet Take by mouth.  1 kit 0    cyclobenzaprine (FLEXERIL) 10 MG tablet Take 1 tablet by mouth nightly as needed for Muscle spasms 10 tablet 0    latanoprost (XALATAN) 0.005 % ophthalmic solution nightly      Diclofenac Sodium (VOLTAREN PO) Take by mouth 3 times daily as needed      hydroCHLOROthiazide (HYDRODIURIL) 50 MG tablet TAKE 1 TABLET DAILY 5 tablet 0    potassium chloride (MICRO-K) 10 MEQ extended release capsule TAKE ONE CAPSULE BY MOUTH TWICE DAILY 10 capsule 0    lisinopril (PRINIVIL;ZESTRIL) 5 MG tablet TAKE 1 TABLET DAILY 90 tablet 0    pantoprazole (PROTONIX) 40 MG tablet TAKE 1 TABLET EVERY MORNING BEFORE BREAKFAST 90 tablet 1    EPIPEN 2-AMRITA 0.3 MG/0.3ML SOAJ injection prn 1 each 2    albuterol (PROVENTIL) (2.5 MG/3ML) 0.083% nebulizer solution Take 3 mLs by nebulization every 6 hours as needed for Wheezing or Shortness of Breath 120 vial 2    albuterol sulfate HFA (PROVENTIL HFA) 108 (90 Base) MCG/ACT inhaler Inhale 2 puffs into the lungs every 6 hours as needed for Wheezing 3 Inhaler 0     No current facility-administered medications for this visit. Allergies: Allergies   Allergen Reactions    Daypro [Oxaprozin]     Tetracyclines & Related     Erythromycin Rash        Medical History:     Past Medical History:   Diagnosis Date    Asthma     Hypertension     OA (osteoarthritis)     Obesity        Past Surgical History:   Procedure Laterality Date     SECTION      x2    CHOLECYSTECTOMY      COLONOSCOPY      ; ; due     EAR SURGERY Left        Family History   Problem Relation Age of Onset    Asthma Mother     Liver Cancer Father     Hypertension Father         Social History:     Social History     Socioeconomic History    Marital status:      Spouse name: Not on file    Number of children: Not on file    Years of education: Not on file    Highest education level: Not on file   Occupational History    Not on file   Tobacco Use    Smoking status: Never Smoker    Smokeless tobacco: Never Used   Vaping Use    Vaping Use: Never used   Substance and Sexual Activity    Alcohol use: Yes     Comment: rare     Drug use: No    Sexual activity: Not Currently     Partners: Male   Other Topics Concern    Not on file   Social History Narrative    Not on file     Social Determinants of Health     Financial Resource Strain: Low Risk     Difficulty of Paying Living Expenses: Not hard at all   Food Insecurity: No Food Insecurity    Worried About Running Out of Food in the Last Year: Never true    [address] in the Last Year: Never true   Transportation Needs:     Lack of Transportation (Medical):      Lack of Transportation (Non-Medical):    Physical Activity:     Days of Exercise per Week:     Minutes of Exercise per Session:    Stress:     Feeling of Stress :    Social Connections:     Frequency of Communication with Friends and Family:  Frequency of Social Gatherings with Friends and Family:     Attends Baptism Services:     Active Member of Clubs or Organizations:     Attends Club or Organization Meetings:     Marital Status:    Intimate Partner Violence:     Fear of Current or Ex-Partner:     Emotionally Abused:     Physically Abused:     Sexually Abused:         ROS:     Constitutional: No fevers, chills, fatigue. ENT: No nasal congestion or sore throat  Respiratory: No difficulty in breathing or cough. Cardiovascular: No chest pain, palpitations or shortness of breath  Gastrointestinal: No abdominal pain or change in bowel movements. Genitourinary: No change in urinary frequency or dysuria. Skin: No rashes or skin lesions. Neurological: No weakness. No headaches. MSK: +LBP/left elbow pain/bilateral calf pain         Last Filed Vitals:  /72   Pulse 87   Temp 97.1 °F (36.2 °C) (Temporal)   Wt 223 lb (101.2 kg)   SpO2 97%   BMI 43.55 kg/m²      Physical Examination:     GENERAL APPEARANCE: in no acute distress, well developed, well nourished. HEAD: normocephalic, atraumatic. EYES: extraocular movement intact (EOMI), pupils equal, round, reactive to light and accommodation. EARS: normal, tympanic membrane intact, clear, auditory canal clear. NOSE: nares patent, no erythema, sinuses nontender bilaterally, no rhinorrhea. ORAL CAVITY: mucosa moist, no lesions. THROAT: clear, no mass, no exudate. NECK/THYROID: neck supple, full range of motion, no thyromegaly. HEART: no murmurs, regular rate and rhythm, S1, S2 normal.   LUNGS: clear to auscultation bilaterally, no wheezes, rales, rhonchi. ABDOMEN: normal, bowel sounds present, soft, nontender, nondistended, no rebound guarding or rigidity  Musculoskeletal:   Hip/back:  Inspection: no apparent swelling, ecchymosis         Palpation: no bone, groin or bursa tenderness          Range of Motion: Full without pain          Strength: 5/5 equal bilaterally; mouth.  -     cyclobenzaprine (FLEXERIL) 10 MG tablet; Take 1 tablet by mouth nightly as needed for Muscle spasms    Rx as above. RTC precautions. No need for imaging at this time. All questions answered and addressed to patient satisfaction. Patient understands and agrees to the plan. The patient was evaluated and treated today based on the osteopathic principle that each person is a unit of body, mind, and spirit, the body is capable of self-regulation, self-healing, and health maintenance and that structure and function are reciprocally interrelated. Follow-up:   Return if symptoms worsen or fail to improve.       Brandy De Jesus D.O.

## 2021-09-27 ENCOUNTER — HOSPITAL ENCOUNTER (OUTPATIENT)
Dept: NUTRITION | Age: 66
Setting detail: THERAPIES SERIES
Discharge: HOME | End: 2021-09-27
Payer: MEDICARE

## 2021-09-27 PROCEDURE — 97802 MEDICAL NUTRITION INDIV IN: CPT | Performed by: DIETITIAN, REGISTERED

## 2021-09-27 NOTE — PROGRESS NOTES
the morning)- 1 to 2 eggs with yolk with one 8\" corn tortilla with tomatoes or 3-4 small flour tortillas. With either 1 cup of green tea/black tea/ or black coffee. If she has OJ in the house she will have a small amt of that. During the pandemic she has been going out to [address] for biscuits and eggs and bringing it home. Sometimes she will have a cup of fruit- grapes, watermelon, apples, or banana. L: Usually skips- sometimes if out to eat will have a salad with friends. Sometimes pizza (2 slices)  D: Same as lunch- pizza, homemade chicken noodle/ chili soup. Mediterranean food (ordered out) with hummus and eduin bread. Snacks: bed time (popcorn, pretzels, candy, cup of tea and something sweet)- states this is \"mindlessly eating\"  Snacks: beverages- rarely drinks water, tries to drink Pedialyte drinks. Sometimes has protein shakes. She does chores around the house everyday, is making herself go for walks 2x/week, and she reads the nutrition facts labels and does all of the grocery shopping. Discussed lab levels and recommendations from lab levels. Reviewed lowering cholesterol nutrition therapy/recommendations. Encouraged high fiber intake. Encouraged protein shake intake. Discussed SMART goal making. Next appt: focus more on weight loss, carbohydrate intake, protein intake. Will follow-up in 2-3 months. Past Medical History:  Past Medical History:   Diagnosis Date    Asthma     Hypertension     OA (osteoarthritis)     Obesity      Past Surgical History:   Procedure Laterality Date     SECTION      x2    CHOLECYSTECTOMY      COLONOSCOPY      ; ; due     EAR SURGERY Left        Labs:    Chemistry CBC/Coags Misc. No results for input(s): NA, K, CL, CO2, BUN, CREATININE, GLUCOSE in the last 72 hours. Invalid input(s): CA  No results for input(s): PHOS in the last 72 hours.  No results for input(s): WBC, RBC, HGB, HCT, MCV, MCH, MCHC, RDW, PLT, MPV in the last 72 hours. No results for input(s): LABALBU in the last 72 hours. Invalid input(s):  PREALBUMIN  Lab Results   Component Value Date    LABA1C 5.7 08/17/2021          Other labs: Cholesterol: 188; ; HDL 51; ; Cholesterol: HDL ratio 3.7    Anthropometric Measures:  Height: 5' (152.4 cm)  Weight: 223 lb (101.2 kg)  as of 9/21/2021  Ideal Body Weight: 45.5 kg  Ideal Body Weight %: 222%    BMI: 43.55 kg/m2 which is classified as Obese Class III     Less than 18.5 Underweight  18.5-24.9 Normal Weight  25-29.9 Overweight  30-34.9 Obese Class I  35-39.9 Obese Class II  Greater than or equal to 40 Obese Class III.   Wt Readings from Last 20 Encounters:   09/21/21 223 lb (101.2 kg)   08/23/21 225 lb 3.2 oz (102.2 kg)   02/18/21 216 lb 6.4 oz (98.2 kg)   10/14/20 207 lb (93.9 kg)   09/03/20 205 lb 12.8 oz (93.4 kg)   06/02/20 201 lb (91.2 kg)   05/21/20 202 lb 9.6 oz (91.9 kg)   04/14/20 210 lb (95.3 kg)   04/03/20 218 lb (98.9 kg)   02/24/20 217 lb 4.8 oz (98.6 kg)   02/21/20 220 lb 6.4 oz (100 kg)   01/27/20 220 lb (99.8 kg)   08/30/19 210 lb (95.3 kg)   07/18/19 211 lb 9.6 oz (96 kg)   02/22/19 210 lb (95.3 kg)   01/18/19 208 lb (94.3 kg)   12/20/18 205 lb (93 kg)   12/11/18 204 lb (92.5 kg)   10/08/18 206 lb (93.4 kg)   07/11/18 206 lb (93.4 kg)       Assessment and Plan:    Nutritional Requirements:  Estimated Energy Needs:  Weight Used: 101.2kg   Method Used: MSJ 1.3, 1.5 (-275-450 kcal for weight loss)  Estimated kcal Needs: 1956-9615  kcal per day  Protein Needs:  Weight used: 45.5 kg  2.2-2.5 g/kg = 100-114 g per day      Nutrition Diagnosis and Goal  Problem: Obesity   Etiology/related to: Food and nutrition related knowledge deficit  Symptoms/Signs/as evidenced by: BMI 43.55. inability to lose significant amount of excess weight through conventional weight loss intervention       Goal: Start weight long weights loss program  Progress towards goal: Goal Started      Jarred Roland RD, LD  Office Number: [phone]

## 2021-09-28 NOTE — TELEPHONE ENCOUNTER
Pt cannot see the pulmonologist until July and she wants to know if you will be able to order  Her some type of therapy for her lungs?    Something she can do in her home Snag 45 Transitions Initial Follow Up Call    Outreach made within 2 business days of discharge: Yes    Patient: Lisa Mitchell Patient : 1982   MRN: [id number]  Reason for Admission: There are no discharge diagnoses documented for the most recent discharge. Discharge Date: 21       Spoke with: MICHAEL for patient to return call at their earliest convenience. Discharge department/facility: [facility]      Scheduled appointment with PCP within 7-14 days    Follow Up  No future appointments.     Any Valle CMA (AAMA)

## 2021-09-30 ENCOUNTER — TELEPHONE (OUTPATIENT)
Dept: FAMILY MEDICINE CLINIC | Age: 66
End: 2021-09-30

## 2021-10-19 ENCOUNTER — NURSE ONLY (OUTPATIENT)
Dept: FAMILY MEDICINE CLINIC | Age: 66
End: 2021-10-19
Payer: MEDICARE

## 2021-10-19 ENCOUNTER — VIRTUAL VISIT (OUTPATIENT)
Dept: BEHAVIORAL/MENTAL HEALTH CLINIC | Age: 66
End: 2021-10-19
Payer: MEDICARE

## 2021-10-19 ENCOUNTER — TELEPHONE (OUTPATIENT)
Dept: FAMILY MEDICINE CLINIC | Age: 66
End: 2021-10-19

## 2021-10-19 DIAGNOSIS — Z23 NEED FOR IMMUNIZATION AGAINST INFLUENZA: Primary | ICD-10-CM

## 2021-10-19 DIAGNOSIS — E78.5 DYSLIPIDEMIA: ICD-10-CM

## 2021-10-19 DIAGNOSIS — E66.01 CLASS 3 SEVERE OBESITY DUE TO EXCESS CALORIES WITH SERIOUS COMORBIDITY AND BODY MASS INDEX (BMI) OF 40.0 TO 44.9 IN ADULT (HCC): Primary | ICD-10-CM

## 2021-10-19 DIAGNOSIS — F43.22 ADJUSTMENT DISORDER WITH ANXIETY: Primary | ICD-10-CM

## 2021-10-19 PROCEDURE — 90694 VACC AIIV4 NO PRSRV 0.5ML IM: CPT | Performed by: FAMILY MEDICINE

## 2021-10-19 PROCEDURE — G0008 ADMIN INFLUENZA VIRUS VAC: HCPCS | Performed by: FAMILY MEDICINE

## 2021-10-19 PROCEDURE — 1036F TOBACCO NON-USER: CPT | Performed by: PSYCHOLOGIST

## 2021-10-19 PROCEDURE — 90834 PSYTX W PT 45 MINUTES: CPT | Performed by: PSYCHOLOGIST

## 2021-10-19 NOTE — TELEPHONE ENCOUNTER
----- Message from Lyndsey Alford sent at 10/19/2021 10:35 AM EDT -----  Subject: Message to Provider    QUESTIONS  Information for Provider? Her dietician would like Her to have her Vitamin   D levels checked and her thyroid.   ---------------------------------------------------------------------------  --------------  CALL BACK INFO  What is the best way for the office to contact you? OK to leave message on   voicemail  Preferred Call Back Phone Number? [phone]  ---------------------------------------------------------------------------  --------------  SCRIPT ANSWERS  Relationship to Patient?  Self

## 2021-10-19 NOTE — PROGRESS NOTES
Vaccine Information Sheet, \"Influenza - Inactivated\"  given to Benjamin Bennett, or parent/legal guardian of  Benjamin Bennett and verbalized understanding. Patient responses:    Have you ever had a reaction to a flu vaccine? No  Do you have any current illness? No  Have you ever had [place] Syndrome? No  Do you have a serious allergy to any of the following: Neomycin, Polymyxin, Thimerosal, eggs or egg products? No    Flu vaccine given per order. Please see immunization tab. Risks and benefits explained. Current VIS given.
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
## 2021-10-21 LAB — TSH SERPL DL<=0.05 MIU/L-ACNC: 1.94 UIU/ML

## 2021-10-27 DIAGNOSIS — E66.01 CLASS 3 SEVERE OBESITY DUE TO EXCESS CALORIES WITH SERIOUS COMORBIDITY AND BODY MASS INDEX (BMI) OF 40.0 TO 44.9 IN ADULT (HCC): ICD-10-CM

## 2021-10-27 DIAGNOSIS — E78.5 DYSLIPIDEMIA: ICD-10-CM

## 2021-11-17 ENCOUNTER — IMMUNIZATION (OUTPATIENT)
Dept: FAMILY MEDICINE CLINIC | Age: 66
End: 2021-11-17
Payer: MEDICARE

## 2021-11-17 PROCEDURE — 0064A COVID-19, MODERNA BOOSTER VACCINE 0.25ML DOSE: CPT | Performed by: INTERNAL MEDICINE

## 2021-11-17 PROCEDURE — 91306 COVID-19, MODERNA BOOSTER VACCINE 0.25ML DOSE: CPT | Performed by: INTERNAL MEDICINE

## 2021-11-19 ENCOUNTER — VIRTUAL VISIT (OUTPATIENT)
Dept: BEHAVIORAL/MENTAL HEALTH CLINIC | Age: 66
End: 2021-11-19
Payer: MEDICARE

## 2021-11-19 DIAGNOSIS — F43.22 ADJUSTMENT DISORDER WITH ANXIETY: Primary | ICD-10-CM

## 2021-11-19 PROCEDURE — 90837 PSYTX W PT 60 MINUTES: CPT | Performed by: PSYCHOLOGIST

## 2021-11-19 PROCEDURE — 1036F TOBACCO NON-USER: CPT | Performed by: PSYCHOLOGIST

## 2021-11-19 NOTE — PROGRESS NOTES
Andrea Quiroz,  Ph.D.   Licensed Clinical Psychologist ([phone])    Visit Date: 11/19/2021   Time of appointment:  11:11a - 12:04p   Time spent with Patient: 53 minutes. This is patient's 18th appointment. Reason for Consult:  Stress and Anxiety     Referring Provider/PCP:    No ref. provider found  York Phlegm, DO      Pt provided informed consent for the behavioral health program. Discussed with patient model of service to include the limits of confidentiality (i.e. abuse reporting, suicide intervention, etc.) and short-term intervention focused approach. Pt indicated understanding. Pt provided verbal consent to engage in telehealth psychotherapy.  This visit was completed virtually using doxy. me due to contact restrictions related to the COVID-19 pandemic.  Session was held in a private space ([employer]) and she reported that she was alone. The video call started several minutes late due to initial connection issues. Maria Elena Chacon is a 74 y.o. female who presents for follow up of stress and anxiety, mostly related to relationship issues with her . She reported that she has been feeling more anxious and stressed over the past few weeks and in particular, has felt a \"tightness\" in her chest.  She reported that she has been regularly using her peak flow monitor as her pulmonologist has instructed her to do, so she knows she has been getting enough oxygen. She stated that the symptoms tend to occur when her pulse rate is elevated, as shown on her FitBit. As a result, she reported that she recognizes that the symptoms are stress and anxiety related. She admitted that she has not been practicing the relaxation exercises she first learned from the [facility] over the past few months because she had not needed them as much until recently, but she plans to get back to practicing them. eating habits. 2)  Pt will continue to practice interpersonal and coping skills with her , including setting firm boundaries and limits. 3)  Pt will be seen for a virtual follow-up by the [facility] in 4 weeks on 11/19/21 at 11AM.        MENTAL STATUS EXAM  Mood was anxious with calm affect. Suicidal ideation was denied. Homicidal ideation was denied. Hygiene was good . Dress was appropriate. Behavior was Within Normal Limits with No observation or self-report of difficulties ambulating. Attitude was Cooperative. Eye-contact was good. Speech: rate - WNL, rhythm - WNL, volume - WNL. Verbalizations were goal directed and coherent. Thought processes were intact and goal-oriented without evidence of delusions, hallucinations, obsessions, or caleb; with no cognitive distortions. Associations were characterized by intact cognitive processes. Pt was orientated oriented to person, place, time, and general circumstances;  recent:  good. Insight and judgment were estimated to be good, AEB, a good  understanding of cyclical maladaptive patterns, and the ability to use insight to inform behavior change. ASSESSMENT  Author Sumaya presented to the appointment today for evaluation and treatment of symptoms of anxiety and stress, mostly related to relationship issues. She is currently deemed no risk to herself or others and meets criteria for Adjustment Disorder. She responded well to interventions. She was encouraged to return to practicing relaxation exercises, including deep breathing, guided imagery, and meditation. Edwin Coelho was in agreement with recommendations.     PHQ Scores 9/21/2021 8/23/2021 2/18/2021 2/18/2021 10/12/2020 9/3/2020 6/2/2020   PHQ2 Score 0 - 0 0 0 0 0   PHQ9 Score 0 0 0 0 0 0 0     Interpretation of Total Score Depression Severity: 1-4 = Minimal depression, 5-9 = Mild depression, 10-14 = Moderate depression, 15-19 = Moderately severe depression, 20-27 = Severe depression    How often pt has had thoughts of death or hurting self (if PHQ positive for depression):       No flowsheet data found. Interpretation of TOD-7 score: 5-9 = mild anxiety, 10-14 = moderate anxiety, 15+ = severe anxiety. Recommend referral to behavioral health for scores 10 or greater. DIAGNOSIS  Trey Gentile was seen today for stress and anxiety. Diagnoses and all orders for this visit:    Adjustment disorder with anxiety          INTERVENTION  Trained in strategies for increasing balanced thinking, Trained in relaxation strategies, Trained in improving communication skills, Discussed self-care (sleep, nutrition, rewarding activities, social support, exercise), Supportive techniques, Emphasized self-care as important for managing overall health and Problem-solving re: managing conflict with       PLAN  1)  Pt will continue to engage in self-care activities, including journaling, practicing relaxation and deep breathing exercises, safely engaging in enjoyable activities in and outside of the house, and improving nutrition and eating habits. 2)  Pt will continue to practice interpersonal and coping skills with her , including setting firm boundaries and limits. 3)  Pt will [facility] on 12/17/21 at 11AM.      INTERACTIVE COMPLEXITY  Is interactive complexity present?   No  Reason:  N/A  Additional Supporting Information:  N/A       Electronically signed by Pankaj Still, PhD on 11/19/21 at 11:02 AM EST

## 2021-12-17 ENCOUNTER — VIRTUAL VISIT (OUTPATIENT)
Dept: BEHAVIORAL/MENTAL HEALTH CLINIC | Age: 66
End: 2021-12-17
Payer: MEDICARE

## 2021-12-17 DIAGNOSIS — F43.22 ADJUSTMENT DISORDER WITH ANXIETY: Primary | ICD-10-CM

## 2021-12-17 PROCEDURE — 1036F TOBACCO NON-USER: CPT | Performed by: PSYCHOLOGIST

## 2021-12-17 PROCEDURE — 90837 PSYTX W PT 60 MINUTES: CPT | Performed by: PSYCHOLOGIST

## 2021-12-17 NOTE — PROGRESS NOTES
Baylee Mcduffie,  Ph.D.   Licensed Clinical Psychologist ([phone])    Visit Date: 12/17/2021   Time of appointment:  11:18a - 12:15p   Time spent with Patient: 57 minutes. This is patient's 19th appointment. Reason for Consult:  Stress and Anxiety     Referring Provider/PCP:    No ref. provider found  Benita Borrero DO      Pt provided informed consent for the behavioral health program. Discussed with patient model of service to include the limits of confidentiality (i.e. abuse reporting, suicide intervention, etc.) and short-term intervention focused approach. Pt indicated understanding. Pt provided verbal consent to engage in telehealth psychotherapy.  This visit was completed virtually using doxy. me due to contact restrictions related to the COVID-19 pandemic.  Session was held in a private space ([address] [place] [address]) and she reported that she was alone. The video call started several minutes late due to the pt having to arrange for her  to watch their grandchild, who she was babysitting today.       [facility]/Clinician [address] [place] [address] Richie Oneill is a 74 y.o. female who presents for follow up of stress and anxiety, mostly related to relationship issues with her . She reported that she feels like she is still in a \"holding pattern\" in regards to her  and has had \"ups and downs\" with him over the past month. She described several of these \"up and down\" interactions she has had with her . She reported that there were a few times that he was \"shockingly compliant\" rather than being oppositional and extremely negative. She stated that as she was preparing the house for her sisters' Thanksgiving visit, he generally refused to help with any of the cleaning which was initially stressful. However, she described how she practiced \"just letting it go\" to prevent letting her  cause her more agitation. Mark Mar described one issue that happened with her  over the past few weeks that was extremely upsetting. She described him hiding a legal issue with one of the duplex homes that they own, though he is the only name on the property. She stated that he eventually revealed he had a court arraignment hearing just days before the date of the hearing, but he tried to exclude her involvement. She reported that he has been trying to get their son to help him, which she feels is inappropriate, rather than talking with her. She reported that she was \"livid,\" not only because he tried to hide this issue from her and unnecessarily involved their son, but also because he made no attempts to prepare for the hearing, such as contacting a , or address the legal issue. She stated that this issue was a reminder that she is not sure she can trust him to manage finances and other things that could have severe consequences for one or both of them. For example, she stated that her  is responsible for making mortgage payments and if he mismanages this and hides it from her, it could cost them their home. She reported that she was able to get a meeting scheduled with a  on 1/18/22 and she told her  the date and time to join her. She stated that she hopes he attends, but plans to go with or without him. Previous Recommendations:   1)  Pt will continue to engage in self-care activities, including journaling, practicing relaxation and deep breathing exercises, safely engaging in enjoyable activities in and outside of the house, and improving nutrition and eating habits. 2)  Pt will continue to practice interpersonal and coping skills with her , including setting firm boundaries and limits. 3)  Pt will be seen for a virtual follow-up by the [facility] in 4 weeks on 12/17/21 at 11AM.        MENTAL STATUS EXAM  Mood was anxious with calm affect. Suicidal ideation was denied. Homicidal ideation was denied. Hygiene was good . Dress was appropriate. Behavior was Within Normal Limits with No self-report of difficulties ambulating. Attitude was Cooperative. Eye-contact was good. Speech: rate - WNL, rhythm - WNL, volume - WNL. Verbalizations were goal directed and coherent. Thought processes were intact and goal-oriented without evidence of delusions, hallucinations, obsessions, or caleb; with no cognitive distortions. Associations were characterized by intact cognitive processes. Pt was orientated oriented to person, place, time, and general circumstances;  recent:  good. Insight and judgment were estimated to be good, AEB, a good  understanding of cyclical maladaptive patterns, and the ability to use insight to inform behavior change. ASSESSMENT  Kraig Garcia presented to the appointment today for evaluation and treatment of symptoms of anxiety and stress, mostly related to marital issues. She is currently deemed no risk to herself or others and meets criteria for Adjustment Disorder. She responded well to interventions, including providing empathic support and validation, discussing ways to communicate more effectively with her , and problem solving conflict with him. Barb Pina was in agreement with recommendations. PHQ Scores 9/21/2021 8/23/2021 2/18/2021 2/18/2021 10/12/2020 9/3/2020 6/2/2020   PHQ2 Score 0 - 0 0 0 0 0   PHQ9 Score 0 0 0 0 0 0 0     Interpretation of Total Score Depression Severity: 1-4 = Minimal depression, 5-9 = Mild depression, 10-14 = Moderate depression, 15-19 = Moderately severe depression, 20-27 = Severe depression    How often pt has had thoughts of death or hurting self (if PHQ positive for depression):       No flowsheet data found. Interpretation of TOD-7 score: 5-9 = mild anxiety, 10-14 = moderate anxiety, 15+ = severe anxiety. Recommend referral to behavioral health for scores 10 or greater.       DIAGNOSIS  Barb Pina was seen today for stress and anxiety. Diagnoses and all orders for this visit:    Adjustment disorder with anxiety          INTERVENTION  Trained in strategies for increasing balanced thinking, Trained in improving communication skills, Discussed self-care (sleep, nutrition, rewarding activities, social support, exercise), Supportive techniques, Emphasized self-care as important for managing overall health and Problem-solving re: managing conflict with her       PLAN  1)  Pt will continue to engage in self-care activities, including journaling, practicing relaxation and deep breathing exercises, safely engaging in enjoyable activities in and outside of the house, and improving nutrition and eating habits. 2)  Pt will continue to practice interpersonal and coping skills with her , including setting firm boundaries and limits. 3)  Pt will be seen for a virtual follow-up by the [facility] in 5.5 weeks (after her  meeting on 1/18) on 1/25/22 at 11AM.      Shama Tovar  Is interactive complexity present?   No  Reason:  N/A  Additional Supporting Information:  N/A       Electronically signed by Zachariah Tenorio, PhD on 12/17/21 at 11:17 AM EST

## 2022-01-18 DIAGNOSIS — J45.21 MILD INTERMITTENT ASTHMA WITH ACUTE EXACERBATION: ICD-10-CM

## 2022-01-18 DIAGNOSIS — Z76.0 MEDICATION REFILL: ICD-10-CM

## 2022-01-18 RX ORDER — LISINOPRIL 5 MG/1
TABLET ORAL
Qty: 90 TABLET | Refills: 1 | Status: SHIPPED | OUTPATIENT
Start: 2022-01-18 | End: 2022-08-31 | Stop reason: SDUPTHER

## 2022-01-18 RX ORDER — HYDROCHLOROTHIAZIDE 50 MG/1
TABLET ORAL
Qty: 90 TABLET | Refills: 1 | Status: SHIPPED | OUTPATIENT
Start: 2022-01-18 | End: 2022-08-31 | Stop reason: SDUPTHER

## 2022-01-18 RX ORDER — ALBUTEROL SULFATE 90 UG/1
2 AEROSOL, METERED RESPIRATORY (INHALATION) EVERY 6 HOURS PRN
Qty: 3 EACH | Refills: 1 | Status: SHIPPED | OUTPATIENT
Start: 2022-01-18

## 2022-01-18 RX ORDER — PANTOPRAZOLE SODIUM 40 MG/1
TABLET, DELAYED RELEASE ORAL
Qty: 90 TABLET | Refills: 1 | Status: SHIPPED | OUTPATIENT
Start: 2022-01-18 | End: 2022-08-31 | Stop reason: SDUPTHER

## 2022-01-18 RX ORDER — POTASSIUM CHLORIDE 750 MG/1
CAPSULE, EXTENDED RELEASE ORAL
Qty: 180 CAPSULE | Refills: 1 | Status: SHIPPED | OUTPATIENT
Start: 2022-01-18 | End: 2022-05-25

## 2022-01-18 RX ORDER — ALBUTEROL SULFATE 2.5 MG/3ML
2.5 SOLUTION RESPIRATORY (INHALATION) EVERY 6 HOURS PRN
Qty: 360 ML | Refills: 1 | Status: SHIPPED | OUTPATIENT
Start: 2022-01-18 | End: 2022-08-25 | Stop reason: SDUPTHER

## 2022-01-18 RX ORDER — EPINEPHRINE 0.3 MG/.3ML
INJECTION INTRAMUSCULAR
Qty: 1 EACH | Refills: 2 | Status: SHIPPED | OUTPATIENT
Start: 2022-01-18

## 2022-01-18 NOTE — TELEPHONE ENCOUNTER
Maritza Goyal is calling to request a refill on the following medication(s):    Medication Request:  Requested Prescriptions     Pending Prescriptions Disp Refills    hydroCHLOROthiazide (HYDRODIURIL) 50 MG tablet 90 tablet 1     Sig: Take 1 tablet daily    lisinopril (PRINIVIL;ZESTRIL) 5 MG tablet 90 tablet 1     Sig: Take 1 tablet daily    potassium chloride (MICRO-K) 10 MEQ extended release capsule 180 capsule 1     Sig: TAKE 1 CAPSULE TWICE A DAY    pantoprazole (PROTONIX) 40 MG tablet 90 tablet 1     Sig: TAKE 1 TABLET EVERY MORNING BEFORE BREAKFAST    albuterol (PROVENTIL) (2.5 MG/3ML) 0.083% nebulizer solution 360 mL 1     Sig: Take 3 mLs by nebulization every 6 hours as needed for Wheezing or Shortness of Breath    albuterol sulfate HFA (PROVENTIL HFA) 108 (90 Base) MCG/ACT inhaler 3 each 1     Sig: Inhale 2 puffs into the lungs every 6 hours as needed for Wheezing    EPIPEN 2-AMRITA 0.3 MG/0.3ML SOAJ injection 1 each 2     Sig: prn       Last Visit Date (If Applicable):  1/59/6825    Next Visit Date:    2/25/2022

## 2022-01-21 ENCOUNTER — TELEPHONE (OUTPATIENT)
Dept: FAMILY MEDICINE CLINIC | Age: 67
End: 2022-01-21

## 2022-01-21 NOTE — TELEPHONE ENCOUNTER
LMOM for patient to call our office in regards to one of her medications. Received notification from [employer] stating they need more information about patients medicare part d plan so they can determine if albuterol neb. Solution will be covered. Patient needs to contact [email or web address] with prescriprion coverage information.

## 2022-01-25 ENCOUNTER — VIRTUAL VISIT (OUTPATIENT)
Dept: BEHAVIORAL/MENTAL HEALTH CLINIC | Age: 67
End: 2022-01-25
Payer: MEDICARE

## 2022-01-25 DIAGNOSIS — F43.22 ADJUSTMENT DISORDER WITH ANXIETY: Primary | ICD-10-CM

## 2022-01-25 PROCEDURE — 90837 PSYTX W PT 60 MINUTES: CPT | Performed by: PSYCHOLOGIST

## 2022-01-25 PROCEDURE — 1036F TOBACCO NON-USER: CPT | Performed by: PSYCHOLOGIST

## 2022-01-25 NOTE — PROGRESS NOTES
Lor Lazar,  Ph.D.   Licensed Clinical Psychologist ([phone])    Visit Date: 1/25/2022   Time of appointment:  11:09a - 12:05p   Time spent with Patient: 56 minutes. This is patient's 20th appointment. Reason for Consult:  Stress and Anxiety     Referring Provider/PCP:    No ref. provider found  Cassie Choe DO      Pt provided informed consent for the behavioral health program. Discussed with patient model of service to include the limits of confidentiality (i.e. abuse reporting, suicide intervention, etc.) and short-term intervention focused approach. Pt indicated understanding. Pt provided verbal consent to engage in telehealth psychotherapy.  This visit was completed virtually using doxy. me due to contact restrictions related to the COVID-19 pandemic.  Session was held in a private space ([address] [place] [address]) and she reported that she was alone.  The video call started several minutes late due to the pt having to arrange for her  to watch their grandchild, who she was babysitting today.       [facility]/[address] Richie Muñoz is a 74 y.o. female who presents for follow up of stress and anxiety, mostly related to relationship issues with her . She reported that she attended the  meeting on 1/18/22 and her  agreed to go with her. She reported that he was somewhat cooperative in completing the financial worksheet before the appointment. During the meeting, she reported that he was dismissive of her concerns about what could happen to her financially if he were to pass away first despite the advisor agreeing with her. She reported that the advisor gave them a financial exercise to complete before they return, but she is doubtful that her  will do it as he continues to be very secretive about his finances.   She described recent frustrations and conflicts she has had with him over the past several weeks. In particular, she reported that she was very upset to find out that he opened another storage unit. She reported that she told him that he needed to show her what he was keeping in there, but he wouldn't respond to her. She stated that she confronted her  about him not being wiling to commit to making any effort and be more cooperative. Jovanny Aviles stated that outside of the relationship with her , everything else has been going well. Previous Recommendations:   1)  Pt will continue to engage in self-care activities, including journaling, practicing relaxation and deep breathing exercises, safely engaging in enjoyable activities in and outside of the house, and improving nutrition and eating habits. 2)  Pt will continue to practice interpersonal and coping skills with her , including setting firm boundaries and limits. 3)  Pt will be seen for a virtual follow-up by the [facility] in 5.5 weeks (after her  meeting on 1/18) on 1/25/22 at 11AM.        [address]  Mood was anxious with congruent affect. Suicidal ideation was denied. Homicidal ideation was denied. Hygiene was good . Dress was appropriate. Behavior was Within Normal Limits with No observation or self-report of difficulties ambulating. Attitude was Cooperative. Eye-contact was good. Speech: rate - WNL, rhythm - WNL, volume - WNL. Verbalizations were goal directed and coherent. Thought processes were intact and goal-oriented without evidence of delusions, hallucinations, obsessions, or caleb; with no cognitive distortions. Associations were characterized by intact cognitive processes. Pt was orientated oriented to person, place, time, and general circumstances;  recent:  good. Insight and judgment were estimated to be good, AEB, a good  understanding of cyclical maladaptive patterns, and the ability to use insight to inform behavior change. ASSESSMENT  Luiz Meza presented to the appointment today for evaluation and treatment of symptoms of anxiety and stress, mostly due to marital issues. She is currently deemed no risk to herself or others and meets criteria for Adjustment Disorder. She responded well to interventions, including providing emotional support and validation and discussing different ways to communicate and manage conflict with her . Ashley Tay was in agreement with recommendations. PHQ Scores 9/21/2021 8/23/2021 2/18/2021 2/18/2021 10/12/2020 9/3/2020 6/2/2020   PHQ2 Score 0 - 0 0 0 0 0   PHQ9 Score 0 0 0 0 0 0 0     Interpretation of Total Score Depression Severity: 1-4 = Minimal depression, 5-9 = Mild depression, 10-14 = Moderate depression, 15-19 = Moderately severe depression, 20-27 = Severe depression    How often pt has had thoughts of death or hurting self (if PHQ positive for depression):       No flowsheet data found. Interpretation of TOD-7 score: 5-9 = mild anxiety, 10-14 = moderate anxiety, 15+ = severe anxiety. Recommend referral to behavioral health for scores 10 or greater. DIAGNOSIS  Ashley Tay was seen today for stress and anxiety. Diagnoses and all orders for this visit:    Adjustment disorder with anxiety          INTERVENTION  Trained in strategies for increasing balanced thinking, Trained in improving communication skills, Discussed self-care (sleep, nutrition, rewarding activities, social support, exercise), Supportive techniques, Emphasized self-care as important for managing overall health and Problem-solving re: managing conflict with her       PLAN  1)  Pt will continue to engage in self-care activities, including journaling, practicing relaxation and deep breathing exercises, safely engaging in enjoyable activities in and outside of the house, and improving nutrition and eating habits.   2)  Pt will continue to practice interpersonal and coping skills with her , including setting firm boundaries and limits. 3)  Pt will be seen for a virtual follow-up by the [facility] in 4 weeks 2/22/22 at 11AM.      INTERACTIVE COMPLEXITY  Is interactive complexity present?   No  Reason:  N/A  Additional Supporting Information:  N/A       Electronically signed by Iman Schwab, PhD on 1/25/22 at 11:01 AM EST

## 2022-02-22 ENCOUNTER — TELEMEDICINE (OUTPATIENT)
Dept: BEHAVIORAL/MENTAL HEALTH CLINIC | Age: 67
End: 2022-02-22
Payer: MEDICARE

## 2022-02-22 DIAGNOSIS — F43.22 ADJUSTMENT DISORDER WITH ANXIETY: Primary | ICD-10-CM

## 2022-02-22 PROCEDURE — 90837 PSYTX W PT 60 MINUTES: CPT | Performed by: PSYCHOLOGIST

## 2022-02-22 PROCEDURE — 1036F TOBACCO NON-USER: CPT | Performed by: PSYCHOLOGIST

## 2022-02-22 NOTE — PROGRESS NOTES
Unique Duarte,  Ph.D.   Licensed Clinical Psychologist ([id number])    Visit Date: 2/22/2022   Time of appointment:  11:26a - 12:21a   Time spent with Patient: 55 minutes. This is patient's 21st appointment. Reason for Consult:  Stress and Anxiety     Referring Provider/PCP:    No ref. provider found  Fariba Herman DO      Pt provided informed consent for the behavioral health program. Discussed with patient model of service to include the limits of confidentiality (i.e. abuse reporting, suicide intervention, etc.) and short-term intervention focused approach. Pt indicated understanding. Pt provided verbal consent to engage in telehealth psychotherapy.  This visit was completed virtually using doxy. me due to contact restrictions related to the COVID-19 pandemic.  Session was held in a private space ([address] [place] [address]) and she reported that she was alone. Session started late due to initial connection Taryn Odom 157; Renetta Diallo is a 74 y.o. female who presents for follow up of stress and anxiety, mostly related to relationship issues with her . She reported that things with her  over the past month have been relatively stable with no major changes, negative or positive. She reported that she and her  did make a short trip together to [facility] to visit their son. She described a few conflicts with her , that although relatively minor, only reminded her of how frustrated she has become with him. She stated that she tried to have a \"heart to heart\" talk with him during their trip, and while he eventually appeared to listen, he said nothing more than that he would \"ponder it. \"  She reported that she is hoping he will do the financial exercise their advisor assigned them, but she's not sure that he will and she knows that he will get irritated by it.   She stated that she would like to do marital therapy with her , preferably someone who is Ep[employer], but she admitted she's not sure that her  would go. Nate Lau reported that despite the frustrations she has with her , she feels that she is \"at peace\" with herself that she has been able to maintain firm boundaries with him and has not let her  cause more anxiety and distress. Previous Recommendations:   1)  Pt will continue to engage in self-care activities, including journaling, practicing relaxation and deep breathing exercises, safely engaging in enjoyable activities in and outside of the house, and improving nutrition and eating habits. 2)  Pt will continue to practice interpersonal and coping skills with her , including setting firm boundaries and limits. 3)  Pt will be seen for a virtual follow-up by the [facility] in 4 weeks 2/22/22 at 11AM.        MENTAL STATUS EXAM  Mood was anxious with calm affect. Suicidal ideation was denied. Homicidal ideation was denied. Hygiene was good . Dress was appropriate. Behavior was Within Normal Limits with No observation or self-report of difficulties ambulating. Attitude was Cooperative. Eye-contact was good. Speech: rate - WNL, rhythm - WNL, volume - WNL. Verbalizations were goal directed and coherent. Thought processes were intact and goal-oriented without evidence of delusions, hallucinations, obsessions, or caleb; with no cognitive distortions. Associations were characterized by intact cognitive processes. Pt was orientated oriented to person, place, time, and general circumstances;  recent:  good. Insight and judgment were estimated to be good, AEB, a good  understanding of cyclical maladaptive patterns, and the ability to use insight to inform behavior change. ASSESSMENT  Edu Miranda presented to the appointment today for evaluation and treatment of symptoms of stress and anxiety, mostly related to marital issues.   She is currently deemed no risk to herself or others and meets criteria for Adjustment Disorder. She responded well to interventions. She was given information for the [facility], which has [facility] 5 therapists. Sam Joshi was in agreement with recommendations. PHQ Scores 9/21/2021 8/23/2021 2/18/2021 2/18/2021 10/12/2020 9/3/2020 6/2/2020   PHQ2 Score 0 - 0 0 0 0 0   PHQ9 Score 0 0 0 0 0 0 0     Interpretation of Total Score Depression Severity: 1-4 = Minimal depression, 5-9 = Mild depression, 10-14 = Moderate depression, 15-19 = Moderately severe depression, 20-27 = Severe depression    How often pt has had thoughts of death or hurting self (if PHQ positive for depression):       No flowsheet data found. Interpretation of TOD-7 score: 5-9 = mild anxiety, 10-14 = moderate anxiety, 15+ = severe anxiety. Recommend referral to behavioral health for scores 10 or greater. DIAGNOSIS  Sam Joshi was seen today for stress and anxiety. Diagnoses and all orders for this visit:    Adjustment disorder with anxiety          INTERVENTION  Practiced assertive communication, Trained in strategies for increasing balanced thinking, Trained in improving communication skills, Discussed self-care (sleep, nutrition, rewarding activities, social support, exercise), Supportive techniques, Emphasized self-care as important for managing overall health and Problem-solving re: managing conflict with her       PLAN  1)  Pt will continue to engage in self-care activities, including journaling, practicing relaxation and deep breathing exercises, safely engaging in enjoyable activities in and outside of the house, and improving nutrition and eating habits. 2)  Pt will continue to practice interpersonal and coping skills with her , including setting firm boundaries and limits. 3)  Pt will be seen for a virtual follow-up by the [facility] in 4 weeks 3/22/22 at 11AM.        INTERACTIVE COMPLEXITY  Is interactive complexity present?   No  Reason: N/A  Additional Supporting Information:  N/A       Electronically signed by Obed Harrison, PhD on 2/22/22 at 11:21 AM EST

## 2022-02-25 ENCOUNTER — OFFICE VISIT (OUTPATIENT)
Dept: FAMILY MEDICINE CLINIC | Age: 67
End: 2022-02-25
Payer: MEDICARE

## 2022-02-25 VITALS
HEIGHT: 60 IN | WEIGHT: 208 LBS | DIASTOLIC BLOOD PRESSURE: 84 MMHG | HEART RATE: 94 BPM | OXYGEN SATURATION: 98 % | SYSTOLIC BLOOD PRESSURE: 136 MMHG | BODY MASS INDEX: 40.84 KG/M2

## 2022-02-25 DIAGNOSIS — I10 HTN, GOAL BELOW 130/80: ICD-10-CM

## 2022-02-25 DIAGNOSIS — Z71.82 EXERCISE COUNSELING: ICD-10-CM

## 2022-02-25 DIAGNOSIS — E78.5 DYSLIPIDEMIA: ICD-10-CM

## 2022-02-25 DIAGNOSIS — Z71.3 DIETARY COUNSELING: ICD-10-CM

## 2022-02-25 DIAGNOSIS — Z13.31 DEPRESSION SCREEN: ICD-10-CM

## 2022-02-25 DIAGNOSIS — Z12.31 BREAST CANCER SCREENING BY MAMMOGRAM: ICD-10-CM

## 2022-02-25 DIAGNOSIS — J41.0 SIMPLE CHRONIC BRONCHITIS (HCC): ICD-10-CM

## 2022-02-25 DIAGNOSIS — Z78.0 POSTMENOPAUSAL: ICD-10-CM

## 2022-02-25 DIAGNOSIS — E55.9 VITAMIN D DEFICIENCY: ICD-10-CM

## 2022-02-25 DIAGNOSIS — E66.01 OBESITY, CLASS III, BMI 40-49.9 (MORBID OBESITY) (HCC): ICD-10-CM

## 2022-02-25 DIAGNOSIS — Z00.00 MEDICARE ANNUAL WELLNESS VISIT, SUBSEQUENT: Primary | ICD-10-CM

## 2022-02-25 DIAGNOSIS — R73.9 HYPERGLYCEMIA: ICD-10-CM

## 2022-02-25 PROCEDURE — 4040F PNEUMOC VAC/ADMIN/RCVD: CPT | Performed by: FAMILY MEDICINE

## 2022-02-25 PROCEDURE — G0439 PPPS, SUBSEQ VISIT: HCPCS | Performed by: FAMILY MEDICINE

## 2022-02-25 PROCEDURE — 3017F COLORECTAL CA SCREEN DOC REV: CPT | Performed by: FAMILY MEDICINE

## 2022-02-25 PROCEDURE — G8417 CALC BMI ABV UP PARAM F/U: HCPCS | Performed by: FAMILY MEDICINE

## 2022-02-25 PROCEDURE — G8484 FLU IMMUNIZE NO ADMIN: HCPCS | Performed by: FAMILY MEDICINE

## 2022-02-25 PROCEDURE — 1123F ACP DISCUSS/DSCN MKR DOCD: CPT | Performed by: FAMILY MEDICINE

## 2022-02-25 ASSESSMENT — PATIENT HEALTH QUESTIONNAIRE - PHQ9
SUM OF ALL RESPONSES TO PHQ QUESTIONS 1-9: 0
1. LITTLE INTEREST OR PLEASURE IN DOING THINGS: 0
SUM OF ALL RESPONSES TO PHQ QUESTIONS 1-9: 0
2. FEELING DOWN, DEPRESSED OR HOPELESS: 0
SUM OF ALL RESPONSES TO PHQ QUESTIONS 1-9: 0
SUM OF ALL RESPONSES TO PHQ QUESTIONS 1-9: 0
SUM OF ALL RESPONSES TO PHQ9 QUESTIONS 1 & 2: 0

## 2022-02-25 ASSESSMENT — LIFESTYLE VARIABLES: HOW OFTEN DO YOU HAVE A DRINK CONTAINING ALCOHOL: NEVER

## 2022-02-25 NOTE — PROGRESS NOTES
Medicare Annual Wellness Visit    Cheryl Foster is here for Medicare [address] [facility] Pierre was seen today for medicare awv. Diagnoses and all orders for this visit:    Medicare annual wellness visit, subsequent    Obesity, Class III, BMI 40-49.9 (morbid obesity) (HCC)    Simple chronic bronchitis ([address]    Exercise counseling    Dietary counseling  -      BMI ABOVE NORMAL F/U    HTN, goal below 130/80  -     Magnesium; Future  -     Renal Function Panel; Future    Dyslipidemia  -     ALT; Future  -     AST; Future  -     CBC with Auto Differential; Future  -     Lipid Panel; Future  -     TSH with Reflex; Future    Hyperglycemia  -     Hemoglobin A1C; Future    Depression screen    Breast cancer screening by mammogram  -     [facility] DIGITAL SCREEN W OR WO CAD BILATERAL; Future    Postmenopausal  -     DEXA BONE DENSITY AXIAL SKELETON; Future    Vitamin D deficiency  -     Vitamin D 25 Hydroxy; Future         Recommendations for Preventive Services Due: see orders and patient instructions/AVS.  Recommended screening schedule for the next 5-10 years is provided to the patient in written form: see Patient Instructions/AVS.        Follow up on labs. Encouraged well balanced diet. Encouraged 150 mins of aerobic activity weekly. Continue current medical regimen. 8-15 mins of the visit were dedicated to screening and assessing for mood disorders - anxiety & depression. Return in about 6 months (around 8/25/2022) for htn; 20 min appt. Subjective   Patient is here for AMV today. Patient PMH obesity, allergies, RAD, HTN, dyslipidemia, GERD, and hyperglycemia. Patient [address] left ear surgery, c-sec x2, and gb removal. Patient fhx mom asthma & dad liver cancer/HTN. Patient is a never smoker. Patient denies regular EtOH consumption. Patient denies illicits. Patient denies SIG E CAPS. Patient denies SI/HI. Patient denies anxiety. Patient denies specific diet. Patient denies regular aerobic activity.  Patient follows with pulmonology for her asthma. Patient had negative mammogram 10/2020. Patient had colonoscopy 2016 and was recommended to have repeat in 10 yrs. Patient last PAP was 2018 and was negative. Patient follows with GYN. Patient is due for DEXA scan. PHQ-9 Total Score: 0 (2/25/2022  2:03 PM)    Patient denies cp/sob/le edema/dizziness/lightheadedness/blurry va/ha. Patient denies PND/orthopnea. Patient's complete Health Risk Assessment and screening values have been reviewed and are found in Flowsheets. The following problems were reviewed today and where indicated follow up appointments were made and/or referrals ordered.     Positive Risk Factor Screenings with Interventions:               General Health and ACP:  General  In general, how would you say your health is?: Fair  In the past 7 days, have you experienced any of the following: New or Increased Pain, New or Increased Fatigue, Loneliness, Social Isolation, Stress or Anger?: (!) Yes  Select all that apply: (!) New or Increased Pain  Do you get the social and emotional support that you need?: Yes  Do you have a Living Will?: (!) No    Advance Directives     Power of  Living Will ACP-Advance Directive ACP-Power of     Not on File Not on File Not on File Not on File      General Health Risk Interventions:  · Pain issues: patient declines any further evaluation/treatment for this issue  · No Living Will: Living will info provided in AVS    Health Habits/Nutrition:     Physical Activity: Inactive    Days of Exercise per Week: 0 days    Minutes of Exercise per Session: 0 min     Have you lost any weight without trying in the past 3 months?: No  Body mass index: (!) 41.3  Have you seen the dentist within the past year?: Yes    Health Habits/Nutrition Interventions:  · Inadequate physical activity:  patient is not ready to increase his/her physical activity level at this time     Safety:  Do you have working smoke detectors?: Yes  Do you have any tripping hazards - loose or unsecured carpets or rugs?: (!) Yes  Do you have any tripping hazards - clutter in doorways, halls, or stairs?: No  Do you have either shower bars, grab bars, non-slip mats or non-slip surfaces in your shower or bathtub?: (!) No  Do all of your stairways have a railing or banister?: Yes  Do you always fasten your seatbelt when you are in a car?: Yes    Safety Interventions:  · Patient declines any further evaluation/treatment for this issue           Objective   Vitals:    02/25/22 1403   BP: 136/84   Pulse: 94   SpO2: 98%   Weight: 208 lb (94.3 kg)   Height: 4' 11.5\" (1.511 m)      Body mass index is 41.31 kg/m². ROS:    Constitutional: No fevers, chills, fatigue. ENT: No nasal congestion or sore throat  Respiratory: No difficulty in breathing or cough. Cardiovascular: No chest pain, palpitations or shortness of breath  Gastrointestinal: No abdominal pain or change in bowel movements. Genitourinary: No change in urinary frequency or dysuria. Skin: No rashes or skin lesions. Neurological: No weakness. No headaches. PE:    GENERAL APPEARANCE: in no acute distress, well developed, well nourished. HEAD: normocephalic, atraumatic. EYES: extraocular movement intact (EOMI), pupils equal, round, reactive to light and accommodation. EARS: normal, tympanic membrane intact, clear, auditory canal clear. NOSE: nares patent, no erythema, sinuses nontender bilaterally, no rhinorrhea. ORAL CAVITY: mucosa moist, no lesions. THROAT: clear, no mass, no exudate. NECK/THYROID: neck supple, full range of motion, no thyromegaly. HEART: no murmurs, regular rate and rhythm, S1, S2 normal.   LUNGS: clear to auscultation bilaterally, no wheezes, rales, rhonchi.    ABDOMEN: normal, bowel sounds present, soft, nontender, nondistended, no rebound guarding or rigidity          Allergies   Allergen Reactions    Daypro [Oxaprozin]     Tetracyclines & Related     Erythromycin Rash     Prior to Visit Medications    Medication Sig Taking? Authorizing Provider   hydroCHLOROthiazide (HYDRODIURIL) 50 MG tablet Take 1 tablet daily Yes Yuval Comes, DO   lisinopril (PRINIVIL;ZESTRIL) 5 MG tablet Take 1 tablet daily Yes Yuval Comes, DO   potassium chloride (MICRO-K) 10 MEQ extended release capsule TAKE 1 CAPSULE TWICE A DAY Yes Yuval Comes, DO   pantoprazole (PROTONIX) 40 MG tablet TAKE 1 TABLET EVERY MORNING BEFORE BREAKFAST Yes Yuval Comes, DO   albuterol (PROVENTIL) (2.5 MG/3ML) 0.083% nebulizer solution Take 3 mLs by nebulization every 6 hours as needed for Wheezing or Shortness of Breath Yes Dunellen Comes, DO   albuterol sulfate HFA (PROVENTIL HFA) 108 (90 Base) MCG/ACT inhaler Inhale 2 puffs into the lungs every 6 hours as needed for Wheezing Yes Yuval Comes, DO   EPIPEN 2-AMRITA 0.3 MG/0.3ML SOAJ injection prn Yes Heber Comes, DO   latanoprost (XALATAN) 0.005 % ophthalmic solution nightly Yes Historical Provider, MD   Diclofenac Sodium (VOLTAREN PO) Take by mouth 3 times daily as needed Yes Historical Provider, MD Farfan (Including outside providers/suppliers regularly involved in providing care):   Patient Care Team:  Dunellen Comes, DO as PCP - General (Family Medicine)  Yuval Huynh DO as PCP - [facility] Empaneled Provider  Kindra Rangel (Ophthalmology)  Marek Prater MD as Consulting Physician (Rheumatology)  Elizabeth Villalpando MD as Consulting Physician (Gastroenterology)    Reviewed and updated this visit:  Tobacco  Allergies  Meds  Med Hx  Surg Hx  Soc Hx  Fam Hx                 BMI was elevated today, and weight loss plan recommended is : conventional weight loss.

## 2022-02-25 NOTE — PATIENT INSTRUCTIONS
Personalized Preventive Plan for Agustin Gay - 2/25/2022  Medicare offers a range of preventive health benefits. Some of the tests and screenings are paid in full while other may be subject to a deductible, co-insurance, and/or copay. Some of these benefits include a comprehensive review of your medical history including lifestyle, illnesses that may run in your family, and various assessments and screenings as appropriate. After reviewing your medical record and screening and assessments performed today your provider may have ordered immunizations, labs, imaging, and/or referrals for you. A list of these orders (if applicable) as well as your Preventive Care list are included within your After Visit Summary for your review. Other Preventive Recommendations:    · A preventive eye exam performed by an eye specialist is recommended every 1-2 years to screen for glaucoma; cataracts, macular degeneration, and other eye disorders. · A preventive dental visit is recommended every 6 months. · Try to get at least 150 minutes of exercise per week or 10,000 steps per day on a pedometer . · Order or download the FREE \"Exercise & Physical Activity: Your Everyday Guide\" from [employer]. Call 3-294.149.9359 or search [employer] online. · You need 2036-0248 mg of calcium and 0137-9215 IU of vitamin D per day. It is possible to meet your calcium requirement with diet alone, but a vitamin D supplement is usually necessary to meet this goal.  · When exposed to the sun, use a sunscreen that protects against both UVA and UVB radiation with an SPF of 30 or greater. Reapply every 2 to 3 hours or after sweating, drying off with a towel, or swimming. · Always wear a seat belt when traveling in a car. Always wear a helmet when riding a bicycle or motorcycle. Learning About Obesity  What is obesity? Obesity means having an unhealthy amount of body fat.  This puts your health in danger. It can lead to other health problems, such as type 2 diabetes and high blood pressure. How do you know if your weight is in the obesity range? To know if your weight is in the obesity range, your doctor looks at your body mass index (BMI) and waist size. BMI is a number that is calculated from your weight and your height. To figure out your BMI for yourself, you can use an online tool, such as http://www.Citysearch.JobApp/ on the [employer]. If your BMI is 30.0 or higher, it falls within the obesity range. Keep in mind that BMI and waist size are only guides. They are not tools to determine your ideal body weight. What causes obesity? When you take in more calories than you burn off, you gain weight. How you eat, how active you are, and other things affect how your body uses calories and whether you gain weight. If you have family members who have too much body fat, you may have inherited a tendency to gain weight. And your family also helps form your eating and lifestyle habits, which can lead to obesity. Also, our busy lives make it harder to plan and cook healthy meals. For many of us, it's easier to reach for prepared foods, go out to eat, or go to the drive-through. But these foods are often high in saturated fat and calories. Portions are often too large. What can you do to reach a healthy weight? Focus on health, not diets. Diets are hard to stay on and don't work in the long run. It is very hard to stay with a diet that includes lots of big changes in your eating habits. Instead of a diet, focus on lifestyle changes that will improve your health and achieve the right balance of energy and calories. To lose weight, you need to burn more calories than you take in. You can do it by eating healthy foods in reasonable amounts and becoming more active, even a little bit every day.  Making small changes over time can add up to a lot. Make a plan for change. Many people have found that naming their reasons for change and staying focused on their plan can make a big difference. Work with your doctor to create a plan that is right for you. Ask yourself: Jennifer Prather are my personal, most powerful reasons for wanting this change? What will my life look like when I've made the change? \"  Set your long-term goal. Make it specific, such as \"I will lose x pounds. \"  Break your long-term goal into smaller, short-term goals. Make these small steps specific and within your reach, things you know you can do. These steps are what keep you going from day to day. Talk with your doctor about other weight-loss options. If you have a BMI in a certain range and have not been able to lose weight with diet and exercise, medicine or surgery may be an option for you. Before your doctor will prescribe medicines or surgery, he or she will probably want you to be more active and follow your healthy eating plan for a period of time. These habits are key lifelong changes for managing your weight, with or without other medical treatment. And these changes can help you avoid weight-related health problems. How can you stay on your plan for change? Be ready. Choose to start during a time when there are few events like holidays, social events, and high-stress periods. These events might trigger slip-ups. Decide on your first few steps. Most people have more success when they make small changes, one step at a time. For example, you might switch a daily candy bar to a piece of fruit, walk 10 minutes more, or add more vegetables to a meal.  Line up your support people. Make sure you're not going to be alone as you make this change. Connect with people who understand how important it is to you. Ask family members and friends for help in keeping with your plan. And think about who could make it harder for you, and how to handle them. Try tracking.  People who keep track of what they eat, feel, and do are better at losing weight. Try writing down things like:  What and how much you eat. How you feel before and after each meal.  Details about each meal (like eating out or at home, eating alone, or with friends or family). What you do to be active. Look and plan. As you track, look for patterns that you may want to change. Take note of: When you eat and whether you skip meals. How often you eat out. How many fruits and vegetables you eat. When you eat beyond feeling full. When and why you eat for reasons other than being hungry. When you stray from your plan, don't get upset. Figure out what made you slip up and how you can fix it. Can you take medicines or have surgery to lose weight? If you have a BMI in a certain range and have not been able to lose weight with diet and exercise, medicine or surgery may be an option for you. If you have a BMI of at least 30.0 (or a BMI of at least 27.0 and another health problem related to your weight), ask your doctor about weight-loss medicines. They work by making you feel less hungry, making you feel full more quickly, or changing how you digest fat. Medicines are used along with diet changes and more physical activity to help you make lasting changes. If you have a BMI of 40.0 or more (or a BMI of 35.0 or more and another health problem related to your weight), your doctor may talk with you about surgery. Weight-loss surgery has risks, and you will need to work with your doctor to compare the risk of having obesity with the risks of surgery. With any option you choose, you will still need to eat a healthy diet and get regular exercise. Follow-up care is a key part of your treatment and safety. Be sure to make and go to all appointments, and call your doctor if you are having problems. It's also a good idea to know your test results and keep a list of the medicines you take. Where can you learn more?   Go to [email or web address].[email or web address] org and sign in to your Videum account. Enter N111 in the [facility] box to learn more about \"Learning About Obesity. \"     If you do not have an account, please click on the \"Sign Up Now\" link. Current as of: March 17, 2021               Content Version: 13.1  © 5432-9873 Healthwise, Incorporated. Care instructions adapted under license by [facility] ([facility]). If you have questions about a medical condition or this instruction, always ask your healthcare professional. [address] any warranty or liability for your use of this information.     ·

## 2022-03-22 ENCOUNTER — TELEMEDICINE (OUTPATIENT)
Dept: BEHAVIORAL/MENTAL HEALTH CLINIC | Age: 67
End: 2022-03-22
Payer: MEDICARE

## 2022-03-22 DIAGNOSIS — F43.22 ADJUSTMENT DISORDER WITH ANXIETY: Primary | ICD-10-CM

## 2022-03-22 PROCEDURE — 90837 PSYTX W PT 60 MINUTES: CPT | Performed by: PSYCHOLOGIST

## 2022-03-22 PROCEDURE — 1036F TOBACCO NON-USER: CPT | Performed by: PSYCHOLOGIST

## 2022-03-22 NOTE — PROGRESS NOTES
Osmar Bowie,  Ph.D.   Licensed Clinical Psychologist (KO [id number])    Visit Date: 3/22/2022   Time of appointment:  11:07a - 12:02p    Time spent with Patient: 55 minutes. This is patient's 22nd appointment. Reason for Consult:  Stress and Anxiety     Referring Provider/PCP:    No ref. provider found  Denae Ashford DO      Pt provided informed consent for the behavioral health program. Discussed with patient model of service to include the limits of confidentiality (i.e. abuse reporting, suicide intervention, etc.) and short-term intervention focused approach. Pt indicated understanding. Pt provided verbal consent to engage in telehealth psychotherapy. This visit was completed virtually using [employer] due to contact restrictions related to the COVID-19 pandemic. Session was held in a private space (pts home in [place], [place]) and she reported that she was alone.       [facility]/Clinician Location:  [address]; Gerardo Hou is a 69 y.o. female who presents for follow up of stress and anxiety, mostly related to relationship problems with her . She reported that there have been no major \"blowups\" with her , but nothing has changed in the positive direction despite he ongoing hopes that he will. She stated that she tried to talk with her  about doing the financial exercise, but he refused and told her that her  will not be advising him, which was disappointing. She reported that she also wants to work on their will, but described how this has been a \"cai\" with him in the past.  Marcela Maxwell reported that there has been some conflict with her daughter that has been more upsetting. She reported that her daughter is recovering from being sick due to gallbladder issues and the baby also suffered from a respiratory infection.   She stated that she encouraged her daughter to get a referral from the baby's pediatrician for a pulmonologist, but her daughter became very defensive and accused Shalonda Hollins of overstepping. Her daughter's response was very hurtful to her, though she understands that her daughter is likely under a lot of stress due to her and the baby's health issues. She reported that she has really tried to remain very mindful of the boundaries with her daughter and the limits in her role as grandmother. Previous Recommendations:   1)  Pt will continue to engage in self-care activities, including journaling, practicing relaxation and deep breathing exercises, safely engaging in enjoyable activities in and outside of the house, and improving nutrition and eating habits. 2)  Pt will continue to practice interpersonal and coping skills with her , including setting firm boundaries and limits. 3)  Pt will be seen for a virtual follow-up by the [facility] in 4 weeks 3/22/22 at 11AM.        MENTAL STATUS EXAM  Mood was anxious with calm affect. Suicidal ideation was denied. Homicidal ideation was denied. Hygiene was good . Dress was appropriate. Behavior was Within Normal Limits with No observation or self-report of difficulties ambulating. Attitude was Cooperative. Eye-contact was good. Speech: rate - WNL, rhythm - WNL, volume - WNL. Verbalizations were goal directed and coherent. Thought processes were intact and goal-oriented without evidence of delusions, hallucinations, obsessions, or caleb; with no cognitive distortions. Associations were characterized by intact cognitive processes. Pt was orientated oriented to person, place, time, and general circumstances;  recent:  good. Insight and judgment were estimated to be good, AEB, a good  understanding of cyclical maladaptive patterns, and the ability to use insight to inform behavior change.        ASSESSMENT  Lisa Turner presented to the appointment today for evaluation and treatment of symptoms of stress and anxiety, mostly due to marital conflict. She is currently deemed no risk to herself or others and meets criteria for Adjustment Disorder. She responded well to interventions, including engaging her in reframing and perspective taking in regards to the conflict with her daughter and modeling more effective ways to communicate with her daughter. Charles Chan was in agreement with recommendations. PHQ Scores 2/25/2022 9/21/2021 8/23/2021 2/18/2021 2/18/2021 10/12/2020 9/3/2020   PHQ2 Score 0 0 - 0 0 0 0   PHQ9 Score 0 0 0 0 0 0 0     Interpretation of Total Score Depression Severity: 1-4 = Minimal depression, 5-9 = Mild depression, 10-14 = Moderate depression, 15-19 = Moderately severe depression, 20-27 = Severe depression    How often pt has had thoughts of death or hurting self (if PHQ positive for depression):       No flowsheet data found. Interpretation of TOD-7 score: 5-9 = mild anxiety, 10-14 = moderate anxiety, 15+ = severe anxiety. Recommend referral to behavioral health for scores 10 or greater. DIAGNOSIS  Charles Chan was seen today for stress and anxiety. Diagnoses and all orders for this visit:    Adjustment disorder with anxiety          INTERVENTION  Trained in strategies for increasing balanced thinking, Trained in improving communication skills, Discussed self-care (sleep, nutrition, rewarding activities, social support, exercise), Supportive techniques, Emphasized self-care as important for managing overall health, Identified maladaptive thoughts and Problem-solving re: conflict management      PLAN  1)  Pt will continue to engage in self-care activities, including journaling, practicing relaxation and deep breathing exercises, safely engaging in enjoyable activities in and outside of the house, and improving nutrition and eating habits. 2)  Pt will continue to practice interpersonal and coping skills with her  and important others (e.g. daughter), including practicing firm boundaries and limits.   3)  Pt will be seen for a virtual follow-up by the [facility] [employer] [facility] in 4 weeks 4/19/22 at 11AM.      INTERACTIVE COMPLEXITY  Is interactive complexity present?   No  Reason:  N/A  Additional Supporting Information:  N/A       Electronically signed by Horacio Dukes, PhD on 3/22/22 at 11:05 AM EDT

## 2022-04-13 DIAGNOSIS — Z78.0 POSTMENOPAUSAL: ICD-10-CM

## 2022-04-22 DIAGNOSIS — Z12.31 BREAST CANCER SCREENING BY MAMMOGRAM: ICD-10-CM

## 2022-05-10 ENCOUNTER — TELEMEDICINE (OUTPATIENT)
Dept: BEHAVIORAL/MENTAL HEALTH CLINIC | Age: 67
End: 2022-05-10
Payer: MEDICARE

## 2022-05-10 DIAGNOSIS — F43.22 ADJUSTMENT DISORDER WITH ANXIETY: Primary | ICD-10-CM

## 2022-05-10 PROCEDURE — 1036F TOBACCO NON-USER: CPT | Performed by: PSYCHOLOGIST

## 2022-05-10 PROCEDURE — 90837 PSYTX W PT 60 MINUTES: CPT | Performed by: PSYCHOLOGIST

## 2022-05-10 NOTE — PROGRESS NOTES
Barrie Royal,  Ph.D.   Licensed Clinical Psychologist ([phone])    Visit Date: 5/10/2022   Time of appointment:  11:13a - 12:08p   Time spent with Patient: 55 minutes. This is patient's 23rd appointment. Reason for Consult:  Stress and Anxiety     Referring Provider/PCP:    No ref. provider found  Veronica Self, DO      Pt provided informed consent for the behavioral health program. Discussed with patient model of service to include the limits of confidentiality (i.e. abuse reporting, suicide intervention, etc.) and short-term intervention focused approach. Pt indicated understanding. Pt provided verbal consent to engage in telehealth psychotherapy.  This visit was completed virtually using [employer] due to contact restrictions related to the COVID-19 pandemic. Session started several minutes late due to issues with Mehul Ellis was held in a private space ([address]) and she reported that she was alone.       [facility]/Clinician Location:  [place] [id number]; Chester Elmore is a 69 y.o. female who presents for follow up of anxiety and stress, mostly related to relationship problems with her . She reported that it has been \"up and down\" with her . In particular, she reported a deeply upsetting incident with her  in which she discovered that he purposely hid a contract to fix their gutters that came in the mail and was addressed to her since he's been against spending money on this. She described this conflict and other conflicts they have had. She stated that during in argument a few weeks ago, her  told her to \"go to hell. \"  She reported that he has never said this to her before and she felt that he \"crossed a line. \"  She stated that she confronted him about this and told him that what he said was \"totally inappropriate. \"  She reported that although he apologized the next day, she did not feel able to accept his apology or forgive him. She stated that since this argument, she has felt even more \"indifferent\" to him. She stated, \"at least I used to value him, but not I don't really feel this anymore. \"  She reported that she feels like she has been more mean and \"non-responsive\" to him, which she feels is a sin. She stated that admitted this sin in confession, but even one of the priests told her that he did not want to give her penance as he felt she had suffered enough. Lukas Alexander reported she is still not sure what to do about her marriage as she does not want to separate but also does not feel they are in a marriage. She reported that she reminds herself that although her marriage causes her grief, at least she has many things in her life that help give her a sense of meaning, purpose, and jose such as her grandbaby, good friends, [employer], and work. Lukas Alexander reported that there were a few times that her  surprised her with doing something nice or thoughtful. She stated that she always tries to show her appreciation and give acknowledgment to her , but these moments have become less meaningful to her. Previous Recommendations:   1)  Pt will continue to engage in self-care activities, including journaling, practicing relaxation and deep breathing exercises, safely engaging in enjoyable activities in and outside of the house, and improving nutrition and eating habits. 2)  Pt will continue to practice interpersonal and coping skills with her  and important others (e.g. daughter), including practicing firm boundaries and limits. 3)  Pt will be seen for a virtual follow-up by the [facility] in 4 weeks 4/19/22 at 11AM.        MENTAL STATUS EXAM  Mood was anxious with mostly calm but at times tearful affect. Suicidal ideation was denied. Homicidal ideation was denied. Hygiene was good . Dress was appropriate. Behavior was Within Normal Limits with No self-report of difficulties ambulating. Attitude was Cooperative. Eye-contact was good. Speech: rate - WNL, rhythm - WNL, volume - WNL. Verbalizations were goal directed and coherent. Thought processes were intact and goal-oriented without evidence of delusions, hallucinations, obsessions, or caleb; with no cognitive distortions. Associations were characterized by intact cognitive processes. Pt was orientated oriented to person, place, time, and general circumstances;  recent:  good. Insight and judgment were estimated to be good, AEB, a good  understanding of cyclical maladaptive patterns, and the ability to use insight to inform behavior change. ASSESSMENT  Jaiden Hagan presented to the appointment today for evaluation and treatment of symptoms of stress and anxiety, mostly related to marital issues. She is currently deemed no risk to herself or others and meets criteria for Adjustment Disorder. She responded well to interventions, including providing emotional support and validation. Jordana Morel was in agreement with recommendations. PHQ Scores 2/25/2022 9/21/2021 8/23/2021 2/18/2021 2/18/2021 10/12/2020 9/3/2020   PHQ2 Score 0 0 - 0 0 0 0   PHQ9 Score 0 0 0 0 0 0 0     Interpretation of Total Score Depression Severity: 1-4 = Minimal depression, 5-9 = Mild depression, 10-14 = Moderate depression, 15-19 = Moderately severe depression, 20-27 = Severe depression    How often pt has had thoughts of death or hurting self (if PHQ positive for depression):       No flowsheet data found. Interpretation of TOD-7 score: 5-9 = mild anxiety, 10-14 = moderate anxiety, 15+ = severe anxiety. Recommend referral to behavioral health for scores 10 or greater. DIAGNOSIS  Jordana Morel was seen today for stress and anxiety.     Diagnoses and all orders for this visit:    Adjustment disorder with anxiety          INTERVENTION  Trained in strategies for increasing balanced thinking, Trained in improving communication skills, Discussed self-care (sleep, nutrition, rewarding activities, social support, exercise), Supportive techniques, Emphasized self-care as important for managing overall health, CBT to target interpersonal skills and maladaptive relationship patterns, Identified maladaptive thoughts and Problem-solving re: conflict management      PLAN  1)  Pt will continue to engage in self-care activities, including journaling, practicing relaxation and deep breathing exercises, safely engaging in enjoyable activities in and outside of the house, and improving nutrition and eating habits. 2)  Pt will continue to practice interpersonal and coping skills with her  and important others (e.g. daughter), including practicing firm boundaries and limits. 3)  Pt will be seen for a virtual follow-up by the [facility] in 4.5 weeks 6/10/22 at 11AM.      INTERACTIVE COMPLEXITY  Is interactive complexity present?   No  Reason:  N/A  Additional Supporting Information:  N/A       Electronically signed by Ericka Nogueira, PhD on 5/10/22 at 11:11 AM WILFRIDO

## 2022-05-25 DIAGNOSIS — Z76.0 MEDICATION REFILL: ICD-10-CM

## 2022-05-25 RX ORDER — POTASSIUM CHLORIDE 750 MG/1
CAPSULE, EXTENDED RELEASE ORAL
Qty: 180 CAPSULE | Refills: 0 | Status: SHIPPED | OUTPATIENT
Start: 2022-05-25 | End: 2022-08-31 | Stop reason: SDUPTHER

## 2022-05-25 NOTE — TELEPHONE ENCOUNTER
Cecil Santoro is calling to request a refill on the following medication(s):    Medication Request:  Requested Prescriptions     Pending Prescriptions Disp Refills    potassium chloride (MICRO-K) 10 MEQ extended release capsule [Pharmacy Med Name: Potassium Chloride ER Oral Capsule Extended Release 10 MEQ] 180 capsule 0     Sig: TAKE ONE CAPSULE BY MOUTH TWICE DAILY       Last Visit Date (If Applicable):  2/48/6013    Next Visit Date:    8/26/2022

## 2022-06-10 ENCOUNTER — TELEMEDICINE (OUTPATIENT)
Dept: BEHAVIORAL/MENTAL HEALTH CLINIC | Age: 67
End: 2022-06-10
Payer: MEDICARE

## 2022-06-10 DIAGNOSIS — F43.22 ADJUSTMENT DISORDER WITH ANXIETY: Primary | ICD-10-CM

## 2022-06-10 PROCEDURE — 1036F TOBACCO NON-USER: CPT | Performed by: PSYCHOLOGIST

## 2022-06-10 PROCEDURE — 90837 PSYTX W PT 60 MINUTES: CPT | Performed by: PSYCHOLOGIST

## 2022-06-10 PROCEDURE — 1123F ACP DISCUSS/DSCN MKR DOCD: CPT | Performed by: PSYCHOLOGIST
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
## 2022-08-02 ENCOUNTER — TELEMEDICINE (OUTPATIENT)
Dept: BEHAVIORAL/MENTAL HEALTH CLINIC | Age: 67
End: 2022-08-02
Payer: MEDICARE

## 2022-08-02 DIAGNOSIS — F43.22 ADJUSTMENT DISORDER WITH ANXIETY: Primary | ICD-10-CM

## 2022-08-02 PROCEDURE — 1036F TOBACCO NON-USER: CPT | Performed by: PSYCHOLOGIST

## 2022-08-02 PROCEDURE — 1123F ACP DISCUSS/DSCN MKR DOCD: CPT | Performed by: PSYCHOLOGIST

## 2022-08-02 PROCEDURE — 90837 PSYTX W PT 60 MINUTES: CPT | Performed by: PSYCHOLOGIST
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
## 2022-08-06 LAB
ALBUMIN SERPL-MCNC: 4.1 G/DL
ALT SERPL-CCNC: 17 U/L
AST SERPL-CCNC: 19 U/L
AVERAGE GLUCOSE: 105
BASOPHILS ABSOLUTE: 0 /ΜL
BASOPHILS RELATIVE PERCENT: 0.6 %
BUN / CREAT RATIO: NORMAL
BUN BLDV-MCNC: 17 MG/DL
CALCIUM SERPL-MCNC: 9.8 MG/DL
CHLORIDE BLD-SCNC: 101 MMOL/L
CHOLESTEROL, TOTAL: 186 MG/DL
CHOLESTEROL/HDL RATIO: 3.7
CO2: 29 MMOL/L
CREAT SERPL-MCNC: 0.76 MG/DL
EOSINOPHILS ABSOLUTE: 0.1 /ΜL
EOSINOPHILS RELATIVE PERCENT: 2 %
GLUCOSE: 85
HBA1C MFR BLD: 5.3 %
HCT VFR BLD CALC: 39.7 % (ref 36–46)
HDLC SERPL-MCNC: 50 MG/DL (ref 35–70)
HEMOGLOBIN: 13.4 G/DL (ref 12–16)
LDL CHOLESTEROL CALCULATED: 104 MG/DL (ref 0–160)
LYMPHOCYTES ABSOLUTE: 1.7 /ΜL
LYMPHOCYTES RELATIVE PERCENT: 28 %
MAGNESIUM: 1.9 MG/DL
MCH RBC QN AUTO: 28.1 PG
MCHC RBC AUTO-ENTMCNC: 33.7 G/DL
MCV RBC AUTO: 84 FL
MONOCYTES ABSOLUTE: 0.4 /ΜL
MONOCYTES RELATIVE PERCENT: 6.7 %
NEUTROPHILS ABSOLUTE: 3.7 /ΜL
NEUTROPHILS RELATIVE PERCENT: 62.7 %
NONHDLC SERPL-MCNC: NORMAL MG/DL
PDW BLD-RTO: 13.7 %
PHOSPHORUS: 3.8 MG/DL
PLATELET # BLD: 246 K/ΜL
PMV BLD AUTO: 9.4 FL
POTASSIUM SERPL-SCNC: 4.2 MMOL/L
RBC # BLD: 4.75 10^6/ΜL
SODIUM BLD-SCNC: 139 MMOL/L
TRIGL SERPL-MCNC: 162 MG/DL
TSH SERPL DL<=0.05 MIU/L-ACNC: 1.34 UIU/ML
VITAMIN D 25-HYDROXY: 53.3
VITAMIN D2, 25 HYDROXY: NORMAL
VITAMIN D3,25 HYDROXY: NORMAL
VLDLC SERPL CALC-MCNC: 32 MG/DL
WBC # BLD: 5.9 10^3/ML

## 2022-08-08 ENCOUNTER — OFFICE VISIT (OUTPATIENT)
Dept: FAMILY MEDICINE CLINIC | Age: 67
End: 2022-08-08
Payer: MEDICARE

## 2022-08-08 VITALS
WEIGHT: 202 LBS | HEART RATE: 74 BPM | OXYGEN SATURATION: 98 % | BODY MASS INDEX: 40.12 KG/M2 | TEMPERATURE: 97.1 F | DIASTOLIC BLOOD PRESSURE: 74 MMHG | SYSTOLIC BLOOD PRESSURE: 118 MMHG

## 2022-08-08 DIAGNOSIS — R73.9 HYPERGLYCEMIA: ICD-10-CM

## 2022-08-08 DIAGNOSIS — E66.01 OBESITY, CLASS III, BMI 40-49.9 (MORBID OBESITY) (HCC): ICD-10-CM

## 2022-08-08 DIAGNOSIS — Z76.89 ENCOUNTER TO ESTABLISH CARE WITH NEW DOCTOR: ICD-10-CM

## 2022-08-08 DIAGNOSIS — E55.9 VITAMIN D DEFICIENCY: ICD-10-CM

## 2022-08-08 DIAGNOSIS — I10 HTN, GOAL BELOW 130/80: ICD-10-CM

## 2022-08-08 DIAGNOSIS — E78.5 DYSLIPIDEMIA: ICD-10-CM

## 2022-08-08 DIAGNOSIS — K21.9 GASTROESOPHAGEAL REFLUX DISEASE, UNSPECIFIED WHETHER ESOPHAGITIS PRESENT: Primary | Chronic | ICD-10-CM

## 2022-08-08 PROBLEM — J41.0 SIMPLE CHRONIC BRONCHITIS (HCC): Status: RESOLVED | Noted: 2021-08-23 | Resolved: 2022-08-08

## 2022-08-08 PROCEDURE — 1090F PRES/ABSN URINE INCON ASSESS: CPT | Performed by: NURSE PRACTITIONER

## 2022-08-08 PROCEDURE — 3017F COLORECTAL CA SCREEN DOC REV: CPT | Performed by: NURSE PRACTITIONER

## 2022-08-08 PROCEDURE — 99214 OFFICE O/P EST MOD 30 MIN: CPT | Performed by: NURSE PRACTITIONER

## 2022-08-08 PROCEDURE — G8417 CALC BMI ABV UP PARAM F/U: HCPCS | Performed by: NURSE PRACTITIONER

## 2022-08-08 PROCEDURE — 1036F TOBACCO NON-USER: CPT | Performed by: NURSE PRACTITIONER

## 2022-08-08 PROCEDURE — G8399 PT W/DXA RESULTS DOCUMENT: HCPCS | Performed by: NURSE PRACTITIONER

## 2022-08-08 PROCEDURE — 1123F ACP DISCUSS/DSCN MKR DOCD: CPT | Performed by: NURSE PRACTITIONER

## 2022-08-08 PROCEDURE — G8427 DOCREV CUR MEDS BY ELIG CLIN: HCPCS | Performed by: NURSE PRACTITIONER

## 2022-08-08 RX ORDER — FAMOTIDINE 40 MG/1
40 TABLET, FILM COATED ORAL EVERY EVENING
Qty: 30 TABLET | Refills: 3 | Status: SHIPPED | OUTPATIENT
Start: 2022-08-08

## 2022-08-08 ASSESSMENT — PATIENT HEALTH QUESTIONNAIRE - PHQ9
SUM OF ALL RESPONSES TO PHQ QUESTIONS 1-9: 0
SUM OF ALL RESPONSES TO PHQ9 QUESTIONS 1 & 2: 0
SUM OF ALL RESPONSES TO PHQ QUESTIONS 1-9: 0
SUM OF ALL RESPONSES TO PHQ QUESTIONS 1-9: 0
2. FEELING DOWN, DEPRESSED OR HOPELESS: 0
SUM OF ALL RESPONSES TO PHQ QUESTIONS 1-9: 0
1. LITTLE INTEREST OR PLEASURE IN DOING THINGS: 0

## 2022-08-08 NOTE — PROGRESS NOTES
Flaquita Huitron [facility]jyoti [facility]  [id number] [facility]. Cottie Diss  Gloria Woodson, [address]  [phone]  [phone]    Lamin Grewal is a 69 y.o. female who is here with c/o of:    Chief Complaint: Discuss Labs      Patient Accompanied by: n/a    HPI - Lamin Grewal is here today with c/o:    Patient here to [facility] care. Previous PCP: Dr Klaus Beauchamp  : Yes  Children: Yes  Work: Retired  Diet: Currently on [employer]  Exercise: In PT  Smoker: No    Patient would like to review her blood work she had recently. Also reports continuous GERD symptoms. Says she sees GI and she is on 40 mg Protonix. Says that she continue to have symptoms despite the medication.      Health Maintenance Due   Topic Date Due    Shingles vaccine (1 of 2) Never done    Pneumococcal 65+ years Vaccine (3 - PPSV23 or PCV20) 2021    COVID-19 Vaccine (4 - Booster for Moderna series) 2022        Patient Active Problem List:     Asthma     Obesity     OA (osteoarthritis)     Hyperlipidemia     BPPV (benign paroxysmal positional vertigo)     GERD (gastroesophageal reflux disease)     Essential hypertension     Anaphylaxis     Other specified glaucoma     Adjustment disorder with anxiety     Other allergy status, other than to drugs and biological substances     Simple chronic bronchitis (HCC)     Past Medical History:   Diagnosis Date    Asthma     Hypertension     OA (osteoarthritis)     Obesity       Past Surgical History:   Procedure Laterality Date     SECTION      x2    CHOLECYSTECTOMY      COLONOSCOPY      ; ; due     EAR SURGERY Left      Family History   Problem Relation Age of Onset    Asthma Mother     Liver Cancer Father     Hypertension Father      Social History     Tobacco Use    Smoking status: Never    Smokeless tobacco: Never   Substance Use Topics    Alcohol use: Yes     Comment: rare      ALLERGIES:    Allergies   Allergen Reactions    Daypro [Oxaprozin]     Tetracyclines & Related Erythromycin Rash          Subjective   Review of Systems   Constitutional:  Negative for activity change, appetite change,unexpected weight change, chills, fever, and fatigue. HENT: Negative for ear pain, sore throat,  Rhinorrhea, sinus pain, sinus pressure, congestion. Eyes:  Negative for pain and discharge. Respiratory:  Negative for chest tightness, shortness of breath, wheezing, and cough. Cardiovascular:  Negative for chest pain, palpitations and leg swelling. Gastrointestinal: Negative for abdominal pain, blood in stool, constipation,diarrhea, nausea and vomiting. Positive reflux  Endocrine: Negative for cold intolerance, heat intolerance, polydipsia, polyphagia and polyuria. Genitourinary: Negative for difficulty urinating, dysuria, flank pain, frequency, hematuria and urgency. Musculoskeletal: Negative for arthralgias, back pain, joint swelling, myalgias, neck pain and neck stiffness. Skin: Negative for rash and wound. Allergic/Immunologic: Negative for environmental allergies and food allergies. Neurological:  Negative for dizziness, light-headedness, numbness and headaches. Hematological:  Negative for adenopathy. Does not bruise/bleed easily. Psychiatric/Behavioral: Negative for self-injury, sleep disturbance and suicidal ideas. Objective   Physical Exam   PHYSICAL EXAM:   Constitutional: Ayla Cross is oriented to person, place, and time. Vital signs are normal. Appears well-developed and well-nourished. She is obese  Head: Normocephalic and atraumatic. Eyes:PERRL, EOMI, Conjunctiva normal, No discharge. Neck: Full passive range of motion. Non-tender on palpation. Neck supple. No thyromegaly present. Trachea normal.  Cardiovascular: Normal rate, regular rhythm, S1, S2, no murmur, no gallop, no friction rub, intact distal pulses. Pulmonary/Chest: Breath sounds are clear throughout, No respiratory distress, No wheezing, No chest tenderness. Effort normal  Abdominal: Soft. Normal appearance, bowel sounds are present and normoactive. There is no hepatosplenomegaly. There is no tenderness. There is no CVA tenderness. Musculoskeletal: Extremities appear regular and symmetric. No evident masses, lesions, foreign bodies, or other abnormalities. No edema. No tenderness on palpation. Joints are stable. Full ROM, strength and tone are within normal limits. Neurological: Alert and oriented to person, place, and time. Normal motor function, Normal sensory function, No focal deficits noted. He has normal strength. Skin: Skin is warm, dry and intact. No obvious lesions on exposed skin  Psychiatric: Normal mood and affect. Speech is normal and behavior is normal.     Nursing note and vitals reviewed. Blood pressure 118/74, pulse 74, temperature 97.1 °F (36.2 °C), temperature source Temporal, weight 202 lb (91.6 kg), SpO2 98 %. Body mass index is 40.12 kg/m².     Wt Readings from Last 3 Encounters:   08/08/22 202 lb (91.6 kg)   02/25/22 208 lb (94.3 kg)   09/21/21 223 lb (101.2 kg)     BP Readings from Last 3 Encounters:   08/08/22 118/74   02/25/22 136/84   09/21/21 124/72       Orders Only on 08/08/2022   Component Date Value Ref Range Status    Vit D, 25-Hydroxy 08/06/2022 53.3   Final    TSH 08/06/2022 1.34  uIU/mL Final    Glucose 08/06/2022 85   Final    BUN 08/06/2022 17  mg/dL Final    Creatinine 08/06/2022 0.76   Final    Sodium 08/06/2022 139  mmol/L Final    Potassium 08/06/2022 4.2  mmol/L Final    Chloride 08/06/2022 101  mmol/L Final    CO2 08/06/2022 29  mmol/L Final    Calcium 08/06/2022 9.8  mg/dL Final    Phosphorus 08/06/2022 3.8  mg/dL Final    Albumin 08/06/2022 4.1   Final    Magnesium 08/06/2022 1.9  mg/dL Final    Cholesterol, Total 08/06/2022 186  mg/dL Final    HDL 08/06/2022 50  35 - 70 mg/dL Final    LDL Calculated 08/06/2022 104  0 - 160 mg/dL Final    Triglycerides 08/06/2022 162  mg/dL Final    Chol/HDL Ratio 08/06/2022 3.7   Final    VLDL 08/06/2022 32 mg/dL Final    Hemoglobin A1C 08/06/2022 5.3  % Final    AVERAGE GLUCOSE 08/06/2022 105   Final    WBC 08/06/2022 5.9  10^3/mL Final    RBC 08/06/2022 4.75  10^6/µL Final    Hemoglobin 08/06/2022 13.4  12.0 - 16.0 g/dL Final    Hematocrit 08/06/2022 39.7  36 - 46 % Final    MCV 08/06/2022 84  fL Final    MCH 08/06/2022 28.1  pg Final    MCHC 08/06/2022 33.7  g/dL Final    Platelets 56/12/8243 246  K/µL Final    RDW 08/06/2022 13.7  % Final    MPV 08/06/2022 9.4  fL Final    Neutrophils % 08/06/2022 62.7  % Final    Lymphocytes % 08/06/2022 28.0  % Final    Monocytes % 08/06/2022 6.7  % Final    Eosinophils % 08/06/2022 2.0  % Final    Basophils % 08/06/2022 0.6  % Final    Neutrophils Absolute 08/06/2022 3.7  /µL Final    Lymphocytes Absolute 08/06/2022 1.7  /µL Final    Monocytes Absolute 08/06/2022 0.4  /µL Final    Eosinophils Absolute 08/06/2022 0.1  /µL Final    Basophils Absolute 08/06/2022 0.0  /µL Final    ALT 08/06/2022 17  U/L Final    AST 08/06/2022 19  U/L Final     No results found for this visit on 08/08/22. Completed Orders/Prescriptions   Orders Placed This Encounter   Medications    famotidine (PEPCID) 40 MG tablet     Sig: Take 1 tablet by mouth every evening     Dispense:  30 tablet     Refill:  3               AssessmentPlan/Medical Decision Making     1. Gastroesophageal reflux disease, unspecified whether esophagitis present    - Continue Protonix 40 mg daily; will add Pepcid at bedtime. Should follow up with GI.   - famotidine (PEPCID) 40 MG tablet; Take 1 tablet by mouth every evening  Dispense: 30 tablet; Refill: 3  - CBC with Auto Differential; Future  - Comprehensive Metabolic Panel; Future  - Magnesium; Future    2. Obesity, Class III, BMI 40-49.9 (morbid obesity) (HCC)    - CBC with Auto Differential; Future  - Comprehensive Metabolic Panel; Future  - Magnesium; Future    3. Dyslipidemia    - Lipid, Fasting; Future    4. Vitamin D deficiency      5.  Hyperglycemia    - Hemoglobin A1C; Future    Return in about 6 months (around 2/8/2023) for chronic conditions. 1.  Shabbir Blanc received counseling on the following healthy behaviors: nutrition, exercise, and medication adherence  2. Patient given educational materials - see patient instructions  3. Was a self-tracking handout given in paper form or via [employer]US[employer]? No  If yes, see orders or list here. 4.  Discussed use, benefit, and side effects of prescribed medications. Barriers to medication compliance addressed. All patient questions answered. Pt voiced understanding. 5.  Reviewed prior labs, imaging, consultation, follow up, and health maintenance  6. Continue current medications, diet and exercise. 7. Discussed use, benefit, and side effects of prescribed medications. Barriers to medication compliance addressed. All her questions were answered. Pt voiced understanding. Shabbir Blanc will continue current medications, diet and exercise. Of the  30  minute duration appointment visit, Ayla Benitez CNP spent at least 50% of the face-to-face time in counseling, explanation of diagnosis, planning of further management, and answering all questions.           Signed:  Ayla Benitez CNP

## 2022-08-11 DIAGNOSIS — K21.9 GASTROESOPHAGEAL REFLUX DISEASE, UNSPECIFIED WHETHER ESOPHAGITIS PRESENT: Chronic | ICD-10-CM

## 2022-08-11 DIAGNOSIS — E78.5 DYSLIPIDEMIA: ICD-10-CM

## 2022-08-11 DIAGNOSIS — I10 HTN, GOAL BELOW 130/80: ICD-10-CM

## 2022-08-11 DIAGNOSIS — E66.01 OBESITY, CLASS III, BMI 40-49.9 (MORBID OBESITY) (HCC): ICD-10-CM

## 2022-08-11 DIAGNOSIS — R73.9 HYPERGLYCEMIA: ICD-10-CM

## 2022-08-23 ENCOUNTER — NURSE TRIAGE (OUTPATIENT)
Dept: OTHER | Age: 67
End: 2022-08-23

## 2022-08-24 ENCOUNTER — TELEPHONE (OUTPATIENT)
Dept: FAMILY MEDICINE CLINIC | Age: 67
End: 2022-08-24

## 2022-08-24 NOTE — TELEPHONE ENCOUNTER
If she is short of breath I would encourage her to use her nebulizer. She can buy Delysm OTC to help with her cough.

## 2022-08-24 NOTE — TELEPHONE ENCOUNTER
Reason for Disposition   [1] COVID-19 diagnosed by positive lab test (e.g., PCR, rapid self-test kit) AND [2] mild symptoms (e.g., cough, fever, others) AND [2] no complications or SOB    Answer Assessment - Initial Assessment Questions  1. COVID-19 DIAGNOSIS: \"Who made your COVID-19 diagnosis? \" \"Was it confirmed by a positive lab test or self-test?\" If not diagnosed by a doctor (or NP/PA), ask \"Are there lots of cases (community spread) where you live? \" Note: See public health department website, if unsure. Home test was done today  2. COVID-19 EXPOSURE: \"Was there any known exposure to COVID before the symptoms began? \" CDC Definition of close contact: within 6 feet (2 meters) for a total of 15 minutes or more over a 24-hour period.  is positive for Covid  3. ONSET: \"When did the COVID-19 symptoms start? \"       Nerissa's symptoms started on Sunday  4. WORST SYMPTOM: \"What is your worst symptom? \" (e.g., cough, fever, shortness of breath, muscle aches)      Cough  5. COUGH: \"Do you have a cough? \" If Yes, ask: \"How bad is the cough? \"        Yes  6. FEVER: \"Do you have a fever? \" If Yes, ask: \"What is your temperature, how was it measured, and when did it start? \"      Temp is 98.3  7. RESPIRATORY STATUS: \"Describe your breathing? \" (e.g., shortness of breath, wheezing, unable to speak)       Cristina Casey has asthma. Denies wheezing. No SOB, speaking without difficulty. States she is not having difficulty breathing. 8. BETTER-SAME-WORSE: \"Are you getting better, staying the same or getting worse compared to yesterday? \"  If getting worse, ask, \"In what way? \"      Feels her cough is getting worse. 9. HIGH RISK DISEASE: \"Do you have any chronic medical problems? \" (e.g., asthma, heart or lung disease, weak immune system, obesity, etc.)      Asthma  10. VACCINE: \"Have you had the COVID-19 vaccine? \" If Yes, ask: \"Which one, how many shots, when did you get it? \"        *No Answer*  11.  BOOSTER: \"Have you received your COVID-19 booster? \" If Yes, ask: \"Which one and when did you get it? \"        *No Answer*  12. PREGNANCY: \"Is there any chance you are pregnant? \" \"When was your last menstrual period? \"        *No Answer*  13. OTHER SYMPTOMS: \"Do you have any other symptoms? \"  (e.g., chills, fatigue, headache, loss of smell or taste, muscle pain, sore throat)        Had a sore throat but this has resolved. 14. O2 SATURATION MONITOR:  \"Do you use an oxygen saturation monitor (pulse oximeter) at home? \" If Yes, ask \"What is your reading (oxygen level) today? \" \"What is your usual oxygen saturation reading? \" (e.g., 95%)        *No Answer*    Protocols used: Coronavirus (COVID-19) Diagnosed or Suspected-ADULT-AH  Denies difficulty breathing. No wheezing. Temp is 98.3. Asking if she can take Coricidin with her albuterol. Write spoke with Manish Chávez and was advised yes, and that patient should avoid Sudafed. Patient advised of same. Home care advice and isolation/masking guidelines reviewed. Patient will follow up with the office in the morning.  nb

## 2022-08-25 DIAGNOSIS — J45.21 MILD INTERMITTENT ASTHMA WITH ACUTE EXACERBATION: ICD-10-CM

## 2022-08-25 RX ORDER — ALBUTEROL SULFATE 2.5 MG/3ML
2.5 SOLUTION RESPIRATORY (INHALATION) EVERY 6 HOURS PRN
Qty: 360 ML | Refills: 1 | Status: SHIPPED | OUTPATIENT
Start: 2022-08-25

## 2022-08-25 NOTE — TELEPHONE ENCOUNTER
She has some left but they are from [address] [place] [address] is calling to request a refill on the following medication(s):    Medication Request:  Requested Prescriptions     Pending Prescriptions Disp Refills    albuterol (PROVENTIL) (2.5 MG/3ML) 0.083% nebulizer solution 360 mL 1     Sig: Take 3 mLs by nebulization every 6 hours as needed for Wheezing or Shortness of Breath       Last Visit Date (If Applicable):  7/0/5622    Next Visit Date:    2/9/2023

## 2022-08-30 ENCOUNTER — TELEPHONE (OUTPATIENT)
Dept: BEHAVIORAL/MENTAL HEALTH CLINIC | Age: 67
End: 2022-08-30

## 2022-08-31 DIAGNOSIS — Z76.0 MEDICATION REFILL: ICD-10-CM

## 2022-08-31 RX ORDER — LISINOPRIL 5 MG/1
TABLET ORAL
Qty: 90 TABLET | Refills: 0 | Status: SHIPPED | OUTPATIENT
Start: 2022-08-31

## 2022-08-31 RX ORDER — PANTOPRAZOLE SODIUM 40 MG/1
TABLET, DELAYED RELEASE ORAL
Qty: 90 TABLET | Refills: 0 | Status: SHIPPED | OUTPATIENT
Start: 2022-08-31

## 2022-08-31 RX ORDER — HYDROCHLOROTHIAZIDE 50 MG/1
TABLET ORAL
Qty: 90 TABLET | Refills: 0 | Status: SHIPPED | OUTPATIENT
Start: 2022-08-31

## 2022-08-31 RX ORDER — POTASSIUM CHLORIDE 750 MG/1
CAPSULE, EXTENDED RELEASE ORAL
Qty: 60 CAPSULE | Refills: 0 | Status: SHIPPED | OUTPATIENT
Start: 2022-08-31 | End: 2022-10-12

## 2022-08-31 NOTE — TELEPHONE ENCOUNTER
Last visit: 8/8/22  Last Med refill: 1/18/22  Does patient have enough medication for 72 hours:   Next Visit Date:  Future Appointments   Date Time Provider Gloria Eilse   10/4/2022 11:00 AM Ángel Monique, PhD Cindi Stark   2/9/2023  3:40 PM Sohail Ochoa, APRN - CNP [address] Maintenance   Topic Date Due    Shingles vaccine (1 of 2) Never done    Pneumococcal 65+ years Vaccine (3 - PPSV23 or PCV20) 07/19/2021    COVID-19 Vaccine (4 - Booster for Moderna series) 03/17/2022    Flu vaccine (1) 09/01/2022    Annual Wellness Visit (AWV)  02/26/2023    Depression Screen  08/08/2023    Breast cancer screen  04/05/2024    Colorectal Cancer Screen  02/10/2026    Lipids  08/06/2027    DTaP/Tdap/Td vaccine (2 - Td or Tdap) 07/11/2028    DEXA (modify frequency per FRAX score)  Completed    Hepatitis C screen  Completed    Hepatitis A vaccine  Aged Out    Hepatitis B vaccine  Aged Out    Hib vaccine  Aged Out    Meningococcal (ACWY) vaccine  Aged Out       Hemoglobin A1C (%)   Date Value   08/06/2022 5.3   08/17/2021 5.7             ( goal A1C is < 7)   No results found for: LABMICR  LDL Calculated (mg/dL)   Date Value   08/06/2022 104   08/17/2021 110       (goal LDL is <100)   AST (U/L)   Date Value   08/06/2022 19     ALT (U/L)   Date Value   08/06/2022 17     BUN (mg/dL)   Date Value   08/06/2022 17     BP Readings from Last 3 Encounters:   08/08/22 118/74   02/25/22 136/84   09/21/21 124/72          (goal 120/80)    All Future Testing planned in [facility]  Lab Frequency Next Occurrence   Comprehensive Metabolic Panel Once 46/82/7575   Lipid, Fasting Once 02/08/2023               Patient Active Problem List:     Asthma     Obesity     OA (osteoarthritis)     Hyperlipidemia     BPPV (benign paroxysmal positional vertigo)     GERD (gastroesophageal reflux disease)     Essential hypertension     Anaphylaxis     Other specified glaucoma     Adjustment disorder with anxiety     Other allergy status, other than to drugs and biological substances

## 2022-10-04 ENCOUNTER — TELEMEDICINE (OUTPATIENT)
Dept: BEHAVIORAL/MENTAL HEALTH CLINIC | Age: 67
End: 2022-10-04
Payer: MEDICARE

## 2022-10-04 DIAGNOSIS — F43.22 ADJUSTMENT DISORDER WITH ANXIETY: Primary | ICD-10-CM

## 2022-10-04 PROCEDURE — 90837 PSYTX W PT 60 MINUTES: CPT | Performed by: PSYCHOLOGIST

## 2022-10-04 PROCEDURE — 1036F TOBACCO NON-USER: CPT | Performed by: PSYCHOLOGIST

## 2022-10-04 PROCEDURE — 1123F ACP DISCUSS/DSCN MKR DOCD: CPT | Performed by: PSYCHOLOGIST

## 2022-10-04 NOTE — PROGRESS NOTES
Zack Bernheim,  Ph.D.   Licensed Clinical Psychologist ([phone])    Visit Date: 10/4/2022   Time of appointment:  11:18a - 12:13p   Time spent with Patient: 55 minutes. This is patient's  26th  appointment. Reason for Consult:  Stress and Anxiety     Referring Provider/PCP:    No ref. provider found  Keith Bernard, APRN - CNP      Pt provided informed consent for the behavioral health program. Discussed with patient model of service to include the limits of confidentiality (i.e. abuse reporting, suicide intervention, etc.) and short-term intervention focused approach. Pt indicated understanding. Pt provided verbal consent to engage in telehealth psychotherapy. This visit was completed virtually using [employer] due to contact restrictions related to the COVID-19 pandemic. Session was held in a private space (pts home in [place], [place]) and she reported that she was alone. The appointment started several minutes late due to technical issues. [facility]/Clinician Location:  Home Office; Sarahy Arreola is a 69 y.o. female who presents for follow up of anxiety and stress, mostly related to relationship problems with her . She was last seen about 2 months ago on 8/2/22 as she missed her last scheduled appointment. She reported that nothing much has changed with her , so she has continued to focus on moving forward with her own plans and goals, especially for the house. She reported that she is getting repairs down around the house, including the chimney which is scheduled to be repaired on 10/24. She stated that she has repeatedly reminded her  that she is expecting him to contribute financially to the chimney repair and he has only replied by saying \"I know. \"  She reported that in general, he has continued to be unwilling to cooperate with anything she has asked him to do.   She stated that she continues to feel increasingly disconnected from him and less willing to consider him in anything she does. She reported that this makes her feel bad at times because she feels like she is being the \"mean one\" and wonders if anyone else notices the dynamic between them. She stated that she also wonders if their relationship could have been different if she had done things differently earlier in their relationship. Outside of issues with her , Josse Judd reported that she has been busy helping her daughter with her baby grandson and she has also returned to working. She stated that she is trying to refocus on managing her time effectively so she can balance work with helping with the baby. She reported that she is also planning a vacation to visit her sister this [facility] in [facility]. She stated that her son is going, but her daughter is not sure yet. She reported that she did tell her  about the trip, but he said he cannot go because he is working, so she did not say anything more. She reported that she is excited about this upcoming vacation with the family. Previous Recommendations:   1)  Pt will continue to engage in self-care activities, including journaling, practicing relaxation and deep breathing exercises, safely engaging in enjoyable activities in and outside of the house, and improving nutrition and eating habits. 2)  Pt will continue to practice interpersonal and coping skills with her  and important others (e.g. daughter), including practicing firm boundaries and limits. 3)  Pt will be seen for a virtual follow-up by the [facility] in 4 weeks 8/30/22 at [address] was anxious with sad  and congruent affect. Suicidal ideation was denied. Homicidal ideation was denied. Hygiene was good . Dress was appropriate. Behavior was Within Normal Limits with No self-report of difficulties ambulating. Attitude was Cooperative. Eye-contact was good.   Speech: rate - WNL, rhythm - WNL, volume - WNL.  Verbalizations were goal directed and coherent. Thought processes were intact and goal-oriented without evidence of delusions, hallucinations, obsessions, or caleb; with no cognitive distortions. Associations were characterized by intact cognitive processes. Pt was orientated oriented to person, place, time, and general circumstances;  recent:  good. Insight and judgment were estimated to be good, AEB, a good  understanding of cyclical maladaptive patterns, and the ability to use insight to inform behavior change. ASSESSMENT  Meggan Carrillo presented to the appointment today for evaluation and treatment of symptoms of stress and anxiety, mostly related to marital issues. She is currently deemed no risk to herself or others and meets criteria for Adjustment Disorder with anxiety. She responded well to interventions, including providing emotional support and validation, engaging in cognitive reframing, and challenging maladaptive thoughts/schemas. She encouraged to stay focused on her goals and priorities and to maintain firm boundaries and limits with her . Brijesh Avilez was in agreement with recommendations. PHQ Scores 8/8/2022 2/25/2022 9/21/2021 8/23/2021 2/18/2021 2/18/2021 10/12/2020   PHQ2 Score 0 0 0 - 0 0 0   PHQ9 Score 0 0 0 0 0 0 0     Interpretation of Total Score Depression Severity: 1-4 = Minimal depression, 5-9 = Mild depression, 10-14 = Moderate depression, 15-19 = Moderately severe depression, 20-27 = Severe depression    How often pt has had thoughts of death or hurting self (if PHQ positive for depression):       No flowsheet data found. Interpretation of TOD-7 score: 5-9 = mild anxiety, 10-14 = moderate anxiety, 15+ = severe anxiety. Recommend referral to behavioral health for scores 10 or greater. DIAGNOSIS  Brijesh Avilez was seen today for stress and anxiety.     Diagnoses and all orders for this visit:    Adjustment disorder with anxiety        INTERVENTION  Trained in strategies for increasing balanced thinking, Trained in improving communication skills, Discussed self-care (sleep, nutrition, rewarding activities, social support, exercise), Supportive techniques, Emphasized self-care as important for managing overall health, and CBT to target interpersonal conflict      PLAN  1)  Pt will continue to engage in self-care activities, including journaling, practicing relaxation and deep breathing exercises, safely engaging in enjoyable activities in and outside of the house, and improving nutrition and eating habits. 2)  Pt will continue to practice interpersonal and coping skills with her  and important others (e.g. daughter), including practicing firm boundaries and limits. 3)  Pt will be seen for a virtual follow-up by the [facility] in 4 weeks 11/1/22 at [address]  Is interactive complexity present?   No  Reason:  N/A  Additional Supporting Information:  N/A       Electronically signed by Anne Dietrich, PhD on 10/4/22 at 11:17 AM JORDIT

## 2022-10-11 DIAGNOSIS — Z76.0 MEDICATION REFILL: ICD-10-CM

## 2022-10-12 RX ORDER — POTASSIUM CHLORIDE 750 MG/1
CAPSULE, EXTENDED RELEASE ORAL
Qty: 60 CAPSULE | Refills: 2 | Status: SHIPPED | OUTPATIENT
Start: 2022-10-12

## 2022-11-22 ENCOUNTER — TELEPHONE (OUTPATIENT)
Dept: FAMILY MEDICINE CLINIC | Age: 67
End: 2022-11-22

## 2022-11-22 RX ORDER — EPINEPHRINE 0.3 MG/.3ML
INJECTION INTRAMUSCULAR
Qty: 1 EACH | Refills: 2 | Status: SHIPPED | OUTPATIENT
Start: 2022-11-22

## 2022-11-22 NOTE — TELEPHONE ENCOUNTER
Meggan Carrillo is calling to request a refill on the following medication(s):    Medication Request:  Requested Prescriptions     Pending Prescriptions Disp Refills    EPIPEN 2-AMRITA 0.3 MG/0.3ML SOAJ injection 1 each 2     Sig: prn       Last Visit Date (If Applicable):  8/1/6724    Next Visit Date:    2/9/2023

## 2022-11-30 ENCOUNTER — OFFICE VISIT (OUTPATIENT)
Dept: FAMILY MEDICINE CLINIC | Age: 67
End: 2022-11-30
Payer: MEDICARE

## 2022-11-30 ENCOUNTER — HOSPITAL ENCOUNTER (OUTPATIENT)
Dept: CT IMAGING | Facility: CLINIC | Age: 67
Discharge: HOME | End: 2022-12-02
Payer: MEDICARE

## 2022-11-30 VITALS
HEART RATE: 94 BPM | DIASTOLIC BLOOD PRESSURE: 82 MMHG | WEIGHT: 195 LBS | BODY MASS INDEX: 38.73 KG/M2 | OXYGEN SATURATION: 95 % | SYSTOLIC BLOOD PRESSURE: 130 MMHG

## 2022-11-30 DIAGNOSIS — S09.90XD INJURY OF HEAD, SUBSEQUENT ENCOUNTER: ICD-10-CM

## 2022-11-30 DIAGNOSIS — K52.9 ACUTE GASTROENTERITIS: Primary | ICD-10-CM
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
## 2022-11-30 PROCEDURE — 70450 CT HEAD/BRAIN W/O DYE: CPT

## 2022-11-30 PROCEDURE — 3078F DIAST BP <80 MM HG: CPT | Performed by: NURSE PRACTITIONER

## 2022-11-30 PROCEDURE — 1090F PRES/ABSN URINE INCON ASSESS: CPT | Performed by: NURSE PRACTITIONER

## 2022-11-30 PROCEDURE — 1123F ACP DISCUSS/DSCN MKR DOCD: CPT | Performed by: NURSE PRACTITIONER

## 2022-11-30 PROCEDURE — G8399 PT W/DXA RESULTS DOCUMENT: HCPCS | Performed by: NURSE PRACTITIONER

## 2022-11-30 PROCEDURE — 3017F COLORECTAL CA SCREEN DOC REV: CPT | Performed by: NURSE PRACTITIONER

## 2022-11-30 PROCEDURE — 99214 OFFICE O/P EST MOD 30 MIN: CPT | Performed by: NURSE PRACTITIONER

## 2022-11-30 PROCEDURE — G8427 DOCREV CUR MEDS BY ELIG CLIN: HCPCS | Performed by: NURSE PRACTITIONER

## 2022-11-30 PROCEDURE — G8484 FLU IMMUNIZE NO ADMIN: HCPCS | Performed by: NURSE PRACTITIONER

## 2022-11-30 PROCEDURE — G8417 CALC BMI ABV UP PARAM F/U: HCPCS | Performed by: NURSE PRACTITIONER

## 2022-11-30 PROCEDURE — 3074F SYST BP LT 130 MM HG: CPT | Performed by: NURSE PRACTITIONER

## 2022-11-30 PROCEDURE — 1036F TOBACCO NON-USER: CPT | Performed by: NURSE PRACTITIONER

## 2022-11-30 SDOH — ECONOMIC STABILITY: FOOD INSECURITY: WITHIN THE PAST 12 MONTHS, THE FOOD YOU BOUGHT JUST DIDN'T LAST AND YOU DIDN'T HAVE MONEY TO GET MORE.: NEVER TRUE

## 2022-11-30 SDOH — ECONOMIC STABILITY: FOOD INSECURITY: WITHIN THE PAST 12 MONTHS, YOU WORRIED THAT YOUR FOOD WOULD RUN OUT BEFORE YOU GOT MONEY TO BUY MORE.: NEVER TRUE

## 2022-11-30 ASSESSMENT — ENCOUNTER SYMPTOMS
VOMITING: 1
NAUSEA: 1
DIARRHEA: 1
ABDOMINAL PAIN: 1

## 2022-11-30 ASSESSMENT — SOCIAL DETERMINANTS OF HEALTH (SDOH): HOW HARD IS IT FOR YOU TO PAY FOR THE VERY BASICS LIKE FOOD, HOUSING, MEDICAL CARE, AND HEATING?: NOT HARD AT ALL

## 2022-11-30 NOTE — PROGRESS NOTES
[facility]  [id number] [facility]. [facility]  [phone]  [phone]    Chepe Estrella is a 69 y.o. female who is here with c/o of:    Chief Complaint: Fall and Diarrhea      Patient Accompanied by: n/a    HPI - Chepe Estrella is here today with c/o:     Chepe Estrella is a 69 y.o. female who presents for evaluation of diarrhea. Onset of diarrhea was 5 days ago. Diarrhea is occurring unsure due to frequent episodes per day. Patient describes diarrhea as watery. Diarrhea has been associated with abdominal pain described as aching, vomiting occurring 1 times, and weight loss of 5 lbs since 1 week ago. Patient denies blood in stool, fever, illness in household contacts, recent antibiotic use, recent camping, significant abdominal pain, unintentional weight loss. Previous visits for diarrhea: none. Evaluation to date: none. Treatment to date: Imodium, BRAT diet. Reports on thanksgiving she went to sit in rolling chair, it rolled out from underneath her and she hit the back of her head on the seat of chair. Denies LOC, dizziness, visual changes or headache. Denies laceration. She has been icing it. She says it is tender to touch.     Health Maintenance Due   Topic Date Due    Shingles vaccine (1 of 2) Never done    Pneumococcal 65+ years Vaccine (3 - PPSV23 if available, else PCV20) 07/19/2021    COVID-19 Vaccine (5 - Booster for Moderna series) 07/11/2022    Flu vaccine (1) 08/01/2022        Patient Active Problem List:     Asthma     Obesity     OA (osteoarthritis)     Hyperlipidemia     BPPV (benign paroxysmal positional vertigo)     GERD (gastroesophageal reflux disease)     Essential hypertension     Anaphylaxis     Other specified glaucoma     Adjustment disorder with anxiety     Other allergy status, other than to drugs and biological substances     Past Medical History:   Diagnosis Date    Asthma     Hypertension     OA (osteoarthritis)     Obesity Past Surgical History:   Procedure Laterality Date     SECTION      x2    CHOLECYSTECTOMY      COLONOSCOPY      ; 2016; due     EAR SURGERY Left      Family History   Problem Relation Age of Onset    Asthma Mother     Liver Cancer Father     Hypertension Father      Social History     Tobacco Use    Smoking status: Never    Smokeless tobacco: Never   Substance Use Topics    Alcohol use: Yes     Comment: rare      ALLERGIES:    Allergies   Allergen Reactions    Daypro [Oxaprozin]     Tetracyclines & Related     Erythromycin Rash          Subjective   Review of Systems   Constitutional:  Positive for appetite change and fatigue. Gastrointestinal:  Positive for abdominal pain, diarrhea, nausea and vomiting. Objective   Physical Exam  HENT:      Head:        Comments: Tenderness and mild swelling on palpation. No laceration or bruising noted     Constitutional: Brijesh Avilez is oriented to person, place, and time. Vital signs are normal. Appears well-developed and well-nourished. She is obese  Head: Normocephalic and atraumatic. Eyes:PERRL, EOMI, Conjunctiva normal, No discharge. Neck: Full passive range of motion. Non-tender on palpation. Neck supple. No thyromegaly present. Trachea normal.  Cardiovascular: Normal rate, regular rhythm, S1, S2, no murmur, no gallop, no friction rub, intact distal pulses. Pulmonary/Chest: Breath sounds are clear throughout, No respiratory distress, No wheezing, No chest tenderness. Effort normal  Abdominal: Soft. Normal appearance, bowel sounds are present and normoactive. There is no hepatosplenomegaly. There is no tenderness. There is no CVA tenderness. Musculoskeletal: Extremities appear regular and symmetric. No evident masses, lesions, foreign bodies, or other abnormalities. No edema. No tenderness on palpation. Joints are stable. Full ROM, strength and tone are within normal limits. Neurological: Alert and oriented to person, place, and time.  Normal motor function, Normal sensory function, No focal deficits noted. He has normal strength. Skin: Skin is warm, dry and intact. No obvious lesions on exposed skin  Psychiatric: Normal mood and affect. Speech is normal and behavior is normal.     Nursing note and vitals reviewed. Blood pressure 130/82, pulse 94, weight 195 lb (88.5 kg), SpO2 95 %. Body mass index is 38.73 kg/m².     Wt Readings from Last 3 Encounters:   11/30/22 195 lb (88.5 kg)   08/08/22 202 lb (91.6 kg)   02/25/22 208 lb (94.3 kg)     BP Readings from Last 3 Encounters:   11/30/22 130/82   08/08/22 118/74   02/25/22 136/84       Orders Only on 08/08/2022   Component Date Value Ref Range Status    Vit D, 25-Hydroxy 08/06/2022 53.3   Final    TSH 08/06/2022 1.34  uIU/mL Final    Glucose 08/06/2022 85   Final    BUN 08/06/2022 17  mg/dL Final    Creatinine 08/06/2022 0.76   Final    Sodium 08/06/2022 139  mmol/L Final    Potassium 08/06/2022 4.2  mmol/L Final    Chloride 08/06/2022 101  mmol/L Final    CO2 08/06/2022 29  mmol/L Final    Calcium 08/06/2022 9.8  mg/dL Final    Phosphorus 08/06/2022 3.8  mg/dL Final    Albumin 08/06/2022 4.1   Final    Magnesium 08/06/2022 1.9  mg/dL Final    Cholesterol, Total 08/06/2022 186  mg/dL Final    HDL 08/06/2022 50  35 - 70 mg/dL Final    LDL Calculated 08/06/2022 104  0 - 160 mg/dL Final    Triglycerides 08/06/2022 162  mg/dL Final    Chol/HDL Ratio 08/06/2022 3.7   Final    VLDL 08/06/2022 32  mg/dL Final    Hemoglobin A1C 08/06/2022 5.3  % Final    AVERAGE GLUCOSE 08/06/2022 105   Final    WBC 08/06/2022 5.9  10^3/mL Final    RBC 08/06/2022 4.75  10^6/µL Final    Hemoglobin 08/06/2022 13.4  12.0 - 16.0 g/dL Final    Hematocrit 08/06/2022 39.7  36 - 46 % Final    MCV 08/06/2022 84  fL Final    MCH 08/06/2022 28.1  pg Final    MCHC 08/06/2022 33.7  g/dL Final    Platelets 68/11/6824 246  K/µL Final    RDW 08/06/2022 13.7  % Final    MPV 08/06/2022 9.4  fL Final    Neutrophils % 08/06/2022 62.7  % Final Lymphocytes % 08/06/2022 28.0  % Final    Monocytes % 08/06/2022 6.7  % Final    Eosinophils % 08/06/2022 2.0  % Final    Basophils % 08/06/2022 0.6  % Final    Neutrophils Absolute 08/06/2022 3.7  /µL Final    Lymphocytes Absolute 08/06/2022 1.7  /µL Final    Monocytes Absolute 08/06/2022 0.4  /µL Final    Eosinophils Absolute 08/06/2022 0.1  /µL Final    Basophils Absolute 08/06/2022 0.0  /µL Final    ALT 08/06/2022 17  U/L Final    AST 08/06/2022 19  U/L Final     No results found for this visit on 11/30/22. Completed Orders/Prescriptions   No orders of the defined types were placed in this encounter. AssessmentPlan/Medical Decision Making     1. Acute gastroenteritis    - Continue Imodium  - BRAT diet  - Increase fluids    2. Fall, subsequent encounter    - CT HEAD WO CONTRAST; Future    3. Injury of head, subsequent encounter    - CT HEAD WO CONTRAST; Future    Return for as scheduled. 1.  Marita Vera received counseling on the following healthy behaviors: nutrition  2. Patient given educational materials - see patient instructions  3. Was a self-tracking handout given in paper form or via [employer]? No  If yes, see orders or list here. 4.  Discussed use, benefit, and side effects of prescribed medications. Barriers to medication compliance addressed. All patient questions answered. Pt voiced understanding. 5.  Reviewed prior labs, imaging, consultation, follow up, and health maintenance  6. Continue current medications, diet and exercise. 7. Discussed use, benefit, and side effects of prescribed medications. Barriers to medication compliance addressed. All her questions were answered. Pt voiced understanding. Marita Vera will continue current medications, diet and exercise.       Of the  30  minute duration appointment visit, Shabbir Yancey CNP spent at least 50% of the face-to-face time in counseling, explanation of diagnosis, planning of further management, and answering all questions.           Signed:  Elizabeth Morgan, HOWARD

## 2022-12-15 ENCOUNTER — TELEPHONE (OUTPATIENT)
Dept: FAMILY MEDICINE CLINIC | Age: 67
End: 2022-12-15

## 2022-12-15 NOTE — TELEPHONE ENCOUNTER
Patient is feeling much better now. Can she get her Flu shot and Covid Booster now?     Please advise    Christian Quiros

## 2022-12-16 ENCOUNTER — TELEPHONE (OUTPATIENT)
Dept: FAMILY MEDICINE CLINIC | Age: 67
End: 2022-12-16

## 2022-12-16 DIAGNOSIS — Z76.0 MEDICATION REFILL: ICD-10-CM

## 2022-12-16 RX ORDER — LISINOPRIL 5 MG/1
TABLET ORAL
Qty: 90 TABLET | Refills: 0 | Status: SHIPPED | OUTPATIENT
Start: 2022-12-16

## 2022-12-16 NOTE — TELEPHONE ENCOUNTER
Fritz Castro is calling to request a refill on the following medication(s):    Medication Request:  Requested Prescriptions     Pending Prescriptions Disp Refills    lisinopril (PRINIVIL;ZESTRIL) 5 MG tablet 90 tablet 0     Sig: Take 1 tablet daily       Last Visit Date (If Applicable):  19/29/9408    Next Visit Date:    2/9/2023

## 2022-12-19 RX ORDER — HYDROCHLOROTHIAZIDE 50 MG/1
TABLET ORAL
Qty: 90 TABLET | Refills: 0 | Status: SHIPPED | OUTPATIENT
Start: 2022-12-19

## 2022-12-19 RX ORDER — PANTOPRAZOLE SODIUM 40 MG/1
TABLET, DELAYED RELEASE ORAL
Qty: 90 TABLET | Refills: 0 | Status: SHIPPED | OUTPATIENT
Start: 2022-12-19

## 2022-12-19 RX ORDER — LISINOPRIL 5 MG/1
TABLET ORAL
Qty: 90 TABLET | Refills: 0 | Status: SHIPPED | OUTPATIENT
Start: 2022-12-19

## 2023-01-04 DIAGNOSIS — Z76.0 MEDICATION REFILL: ICD-10-CM

## 2023-01-05 RX ORDER — POTASSIUM CHLORIDE 750 MG/1
CAPSULE, EXTENDED RELEASE ORAL
Qty: 60 CAPSULE | Refills: 2 | Status: SHIPPED | OUTPATIENT
Start: 2023-01-05

## 2023-01-05 NOTE — TELEPHONE ENCOUNTER
Katherine Benitez is calling to request a refill on the following medication(s):    Medication Request:  Requested Prescriptions     Pending Prescriptions Disp Refills    potassium chloride (MICRO-K) 10 MEQ extended release capsule [Pharmacy Med Name: Potassium Chloride ER Oral Capsule Extended Release 10 MEQ] 60 capsule 0     Sig: TAKE ONE CAPSULE BY MOUTH TWICE DAILY       Last Visit Date (If Applicable):  09/28/3291    Next Visit Date:    2/9/2023

## 2023-01-23 ENCOUNTER — TELEPHONE (OUTPATIENT)
Dept: FAMILY MEDICINE CLINIC | Age: 68
End: 2023-01-23

## 2023-01-23 NOTE — TELEPHONE ENCOUNTER
Patient went to [facility] ED 01/12/23 due to chest pain and was dx. with severe acid reflex.     Patient needs a referral to Dr. Twyla Purcell

## 2023-02-01 ENCOUNTER — TELEPHONE (OUTPATIENT)
Dept: FAMILY MEDICINE CLINIC | Age: 68
End: 2023-02-01

## 2023-02-01 DIAGNOSIS — R73.9 HYPERGLYCEMIA: ICD-10-CM

## 2023-02-01 DIAGNOSIS — E78.5 DYSLIPIDEMIA: Primary | ICD-10-CM

## 2023-02-01 NOTE — TELEPHONE ENCOUNTER
Patient is requesting orders for lab work to be done prior to 6 month check up (02/09)    Would like to have faxed to the lab at [facility] if possible

## 2023-02-02 ENCOUNTER — OFFICE VISIT (OUTPATIENT)
Dept: FAMILY MEDICINE CLINIC | Age: 68
End: 2023-02-02

## 2023-02-02 VITALS
TEMPERATURE: 97.3 F | SYSTOLIC BLOOD PRESSURE: 112 MMHG | WEIGHT: 202 LBS | HEART RATE: 85 BPM | BODY MASS INDEX: 40.12 KG/M2 | OXYGEN SATURATION: 98 % | DIASTOLIC BLOOD PRESSURE: 70 MMHG

## 2023-02-02 DIAGNOSIS — K21.9 GASTROESOPHAGEAL REFLUX DISEASE, UNSPECIFIED WHETHER ESOPHAGITIS PRESENT: Chronic | ICD-10-CM

## 2023-02-02 DIAGNOSIS — Z09 HOSPITAL DISCHARGE FOLLOW-UP: Primary | ICD-10-CM

## 2023-02-02 DIAGNOSIS — R07.9 CHEST PAIN, UNSPECIFIED TYPE: ICD-10-CM

## 2023-02-02 SDOH — ECONOMIC STABILITY: INCOME INSECURITY: HOW HARD IS IT FOR YOU TO PAY FOR THE VERY BASICS LIKE FOOD, HOUSING, MEDICAL CARE, AND HEATING?: NOT HARD AT ALL

## 2023-02-02 SDOH — ECONOMIC STABILITY: HOUSING INSECURITY
IN THE LAST 12 MONTHS, WAS THERE A TIME WHEN YOU DID NOT HAVE A STEADY PLACE TO SLEEP OR SLEPT IN A SHELTER (INCLUDING NOW)?: NO

## 2023-02-02 SDOH — ECONOMIC STABILITY: FOOD INSECURITY: WITHIN THE PAST 12 MONTHS, THE FOOD YOU BOUGHT JUST DIDN'T LAST AND YOU DIDN'T HAVE MONEY TO GET MORE.: NEVER TRUE

## 2023-02-02 SDOH — ECONOMIC STABILITY: FOOD INSECURITY: WITHIN THE PAST 12 MONTHS, YOU WORRIED THAT YOUR FOOD WOULD RUN OUT BEFORE YOU GOT MONEY TO BUY MORE.: NEVER TRUE

## 2023-02-02 ASSESSMENT — PATIENT HEALTH QUESTIONNAIRE - PHQ9
1. LITTLE INTEREST OR PLEASURE IN DOING THINGS: 0
SUM OF ALL RESPONSES TO PHQ QUESTIONS 1-9: 0
SUM OF ALL RESPONSES TO PHQ9 QUESTIONS 1 & 2: 0
2. FEELING DOWN, DEPRESSED OR HOPELESS: 0
SUM OF ALL RESPONSES TO PHQ QUESTIONS 1-9: 0

## 2023-02-02 NOTE — PROGRESS NOTES
[facility] 141  [id number] [facility]. Betsy Massey 78  [phone]  [phone]    Moncarlos Shown is a 69 y.o. female who is here with c/o of:    Chief Complaint: Follow-up and Gastroesophageal Reflux (Wants referral to different Gastroenterologist.)      Patient Accompanied by: n/a    KELLY - Martin Shown is here today with c/o:    Patient here for hospital follow up. Was admitted over night at [facility] for Chest pain. Cardiac workup was negative. She was told she has a cardiac murmur on examine. Official dx was GERD. GERD has improved with medication and dietary changes. Follows with GI- Dr Ruddy Brantley but would like second opinion as she says they just keep increasing her medications.        Health Maintenance Due   Topic Date Due    Shingles vaccine (1 of 2) Never done    Pneumococcal 65+ years Vaccine (3 - PPSV23 if available, else PCV20) 2021    Annual Wellness Visit (AWV)  2023        Patient Active Problem List:     Asthma     Obesity     OA (osteoarthritis)     Hyperlipidemia     BPPV (benign paroxysmal positional vertigo)     GERD (gastroesophageal reflux disease)     Essential hypertension     Anaphylaxis     Other specified glaucoma     Adjustment disorder with anxiety     Other allergy status, other than to drugs and biological substances     Past Medical History:   Diagnosis Date    Asthma     Hypertension     OA (osteoarthritis)     Obesity       Past Surgical History:   Procedure Laterality Date     SECTION      x2    CHOLECYSTECTOMY      COLONOSCOPY      ; ; due     EAR SURGERY Left      Family History   Problem Relation Age of Onset    Asthma Mother     Liver Cancer Father     Hypertension Father      Social History     Tobacco Use    Smoking status: Never    Smokeless tobacco: Never   Substance Use Topics    Alcohol use: Yes     Comment: rare      ALLERGIES:    Allergies   Allergen Reactions    Daypro [Oxaprozin]     Tetracyclines & Related     Erythromycin Rash          Subjective   Review of Systems   Constitutional:  Negative for activity change, appetite change,unexpected weight change, chills, fever, and fatigue. HENT: Negative for ear pain, sore throat,  Rhinorrhea, sinus pain, sinus pressure, congestion. Eyes:  Negative for pain and discharge. Respiratory:  Negative for chest tightness, shortness of breath, wheezing, and cough. Cardiovascular:  Negative for chest pain, palpitations and leg swelling. Gastrointestinal: Negative for abdominal pain, blood in stool, constipation,diarrhea, nausea and vomiting. Endocrine: Negative for cold intolerance, heat intolerance, polydipsia, polyphagia and polyuria. Genitourinary: Negative for difficulty urinating, dysuria, flank pain, frequency, hematuria and urgency. Musculoskeletal: Negative for arthralgias, back pain, joint swelling, myalgias, neck pain and neck stiffness. Skin: Negative for rash and wound. Allergic/Immunologic: Negative for environmental allergies and food allergies. Neurological:  Negative for dizziness, light-headedness, numbness and headaches. Hematological:  Negative for adenopathy. Does not bruise/bleed easily. Psychiatric/Behavioral: Negative for self-injury, sleep disturbance and suicidal ideas. Objective   Physical Exam   PHYSICAL EXAM:   Constitutional: Salbador Ramirez is oriented to person, place, and time. Vital signs are normal. Appears well-developed and well-nourished. She is obese  HEENT:   Head: Normocephalic and atraumatic. Eyes:PERRL, EOMI, Conjunctiva normal, No discharge. Neck: Full passive range of motion. Non-tender on palpation. Neck supple. No thyromegaly present. Trachea normal.  Cardiovascular: Normal rate, regular rhythm, S1, S2, no murmur, no gallop, no friction rub, intact distal pulses.     Pulmonary/Chest: Breath sounds are clear throughout, No respiratory distress, No wheezing, No chest tenderness. Effort normal  Abdominal: Soft. Normal appearance, bowel sounds are present and normoactive. There is no hepatosplenomegaly. There is no tenderness. There is no CVA tenderness. Musculoskeletal: Extremities appear regular and symmetric. No evident masses, lesions, foreign bodies, or other abnormalities. No edema. No tenderness on palpation. Joints are stable. Full ROM, strength and tone are within normal limits. Neurological: Alert and oriented to person, place, and time. Normal motor function, Normal sensory function, No focal deficits noted. He has normal strength. Skin: Skin is warm, dry and intact. No obvious lesions on exposed skin  Psychiatric: Normal mood and affect. Speech is normal and behavior is normal.     Nursing note and vitals reviewed. Blood pressure 112/70, pulse 85, temperature 97.3 °F (36.3 °C), temperature source Temporal, weight 202 lb (91.6 kg), SpO2 98 %. Body mass index is 40.12 kg/m².     Wt Readings from Last 3 Encounters:   02/02/23 202 lb (91.6 kg)   11/30/22 195 lb (88.5 kg)   08/08/22 202 lb (91.6 kg)     BP Readings from Last 3 Encounters:   02/02/23 112/70   11/30/22 130/82   08/08/22 118/74       Orders Only on 08/08/2022   Component Date Value Ref Range Status    Vit D, 25-Hydroxy 08/06/2022 53.3   Final    TSH 08/06/2022 1.34  uIU/mL Final    Glucose 08/06/2022 85   Final    BUN 08/06/2022 17  mg/dL Final    Creatinine 08/06/2022 0.76   Final    Sodium 08/06/2022 139  mmol/L Final    Potassium 08/06/2022 4.2  mmol/L Final    Chloride 08/06/2022 101  mmol/L Final    CO2 08/06/2022 29  mmol/L Final    Calcium 08/06/2022 9.8  mg/dL Final    Phosphorus 08/06/2022 3.8  mg/dL Final    Albumin 08/06/2022 4.1   Final    Magnesium 08/06/2022 1.9  mg/dL Final    Cholesterol, Total 08/06/2022 186  mg/dL Final    HDL 08/06/2022 50  35 - 70 mg/dL Final    LDL Calculated 08/06/2022 104  0 - 160 mg/dL Final    Triglycerides 08/06/2022 162  mg/dL Final    Chol/HDL Ratio 08/06/2022 3.7   Final    VLDL 08/06/2022 32  mg/dL Final    Hemoglobin A1C 08/06/2022 5.3  % Final    AVERAGE GLUCOSE 08/06/2022 105   Final    WBC 08/06/2022 5.9  10^3/mL Final    RBC 08/06/2022 4.75  10^6/µL Final    Hemoglobin 08/06/2022 13.4  12.0 - 16.0 g/dL Final    Hematocrit 08/06/2022 39.7  36 - 46 % Final    MCV 08/06/2022 84  fL Final    MCH 08/06/2022 28.1  pg Final    MCHC 08/06/2022 33.7  g/dL Final    Platelets 97/29/6056 246  K/µL Final    RDW 08/06/2022 13.7  % Final    MPV 08/06/2022 9.4  fL Final    Neutrophils % 08/06/2022 62.7  % Final    Lymphocytes % 08/06/2022 28.0  % Final    Monocytes % 08/06/2022 6.7  % Final    Eosinophils % 08/06/2022 2.0  % Final    Basophils % 08/06/2022 0.6  % Final    Neutrophils Absolute 08/06/2022 3.7  /µL Final    Lymphocytes Absolute 08/06/2022 1.7  /µL Final    Monocytes Absolute 08/06/2022 0.4  /µL Final    Eosinophils Absolute 08/06/2022 0.1  /µL Final    Basophils Absolute 08/06/2022 0.0  /µL Final    ALT 08/06/2022 17  U/L Final    AST 08/06/2022 19  U/L Final     No results found for this visit on 02/02/23. Completed Orders/Prescriptions   No orders of the defined types were placed in this encounter. AssessmentPlan/Medical Decision Making     1. Hospital discharge follow-up      2. Chest pain, unspecified type      3. Gastroesophageal reflux disease, unspecified whether esophagitis present    - Continue Dietary modification  - Continue Protonix as prescribed  - External Referral To Gastroenterology    Return in about 3 months (around 5/2/2023) for [address] [place] [address]. 1.  Traore Enzo received counseling on the following healthy behaviors: nutrition, exercise, and medication adherence  2. Patient given educational materials - see patient instructions  3. Was a self-tracking handout given in paper form or via [employer]? No  If yes, see orders or list here. 4.  Discussed use, benefit, and side effects of prescribed medications.   Barriers to medication compliance addressed. All patient questions answered. Pt voiced understanding. 5.  Reviewed prior labs, imaging, consultation, follow up, and health maintenance  6. Continue current medications, diet and exercise. 7. Discussed use, benefit, and side effects of prescribed medications. Barriers to medication compliance addressed. All her questions were answered. Pt voiced understanding. Franca Chan will continue current medications, diet and exercise. Of the  30  minute duration appointment visit, Keerthi Patel CNP spent at least 50% of the face-to-face time in counseling, explanation of diagnosis, planning of further management, and answering all questions.           Signed:  Keerthi Patel CNP

## 2023-02-10 ENCOUNTER — TELEPHONE (OUTPATIENT)
Dept: FAMILY MEDICINE CLINIC | Age: 68
End: 2023-02-10

## 2023-02-10 NOTE — TELEPHONE ENCOUNTER
Has had a cough in her chest for 5 days , negative covid test went to a drive through at [address] [facility].  Wants you to check out her lungs if you can or just send in some Tussionex for cough it's the only thing that has worked for her in the past.

## 2023-03-07 DIAGNOSIS — Z76.0 MEDICATION REFILL: ICD-10-CM

## 2023-03-07 RX ORDER — HYDROCHLOROTHIAZIDE 50 MG/1
TABLET ORAL
Qty: 90 TABLET | Refills: 1 | Status: SHIPPED | OUTPATIENT
Start: 2023-03-07

## 2023-03-07 RX ORDER — PANTOPRAZOLE SODIUM 40 MG/1
TABLET, DELAYED RELEASE ORAL
Qty: 90 TABLET | Refills: 1 | Status: SHIPPED | OUTPATIENT
Start: 2023-03-07

## 2023-03-07 RX ORDER — LISINOPRIL 5 MG/1
TABLET ORAL
Qty: 90 TABLET | Refills: 1 | Status: SHIPPED | OUTPATIENT
Start: 2023-03-07

## 2023-03-07 NOTE — TELEPHONE ENCOUNTER
[facility](s[facility]    Last Visit Date (If Applicable):  2/6/0124    Next Visit Date:    5/3/2023    Medication Request:  Requested Prescriptions     Pending Prescriptions Disp Refills    pantoprazole (PROTONIX) 40 MG tablet [Pharmacy Med Name: PANTOPRAZOLE TAB 40MG DR] 90 tablet 1     Sig: TAKE 1 TABLET EVERY MORNINGBEFORE BREAKFAST    lisinopril (PRINIVIL;ZESTRIL) 5 MG tablet [Pharmacy Med Name: LISINOPRIL TAB 5MG] 90 tablet 1     Sig: TAKE 1 TABLET DAILY    hydroCHLOROthiazide (HYDRODIURIL) 50 MG tablet [Pharmacy Med Name: HYDROCHLOROT TAB 50MG] 90 tablet 1     Sig: TAKE 1 TABLET DAILY

## 2023-05-04 ENCOUNTER — OFFICE VISIT (OUTPATIENT)
Dept: FAMILY MEDICINE CLINIC | Age: 68
End: 2023-05-04
Payer: MEDICARE

## 2023-05-04 VITALS
WEIGHT: 207 LBS | SYSTOLIC BLOOD PRESSURE: 136 MMHG | HEART RATE: 102 BPM | OXYGEN SATURATION: 98 % | DIASTOLIC BLOOD PRESSURE: 78 MMHG | TEMPERATURE: 97.8 F | BODY MASS INDEX: 41.11 KG/M2

## 2023-05-04 DIAGNOSIS — I10 ESSENTIAL HYPERTENSION: ICD-10-CM

## 2023-05-04 DIAGNOSIS — L85.3 DRY SKIN: Primary | ICD-10-CM

## 2023-05-04 PROCEDURE — G8417 CALC BMI ABV UP PARAM F/U: HCPCS

## 2023-05-04 PROCEDURE — G8399 PT W/DXA RESULTS DOCUMENT: HCPCS

## 2023-05-04 PROCEDURE — 1090F PRES/ABSN URINE INCON ASSESS: CPT

## 2023-05-04 PROCEDURE — 3078F DIAST BP <80 MM HG: CPT

## 2023-05-04 PROCEDURE — 1036F TOBACCO NON-USER: CPT

## 2023-05-04 PROCEDURE — G8427 DOCREV CUR MEDS BY ELIG CLIN: HCPCS

## 2023-05-04 PROCEDURE — 1123F ACP DISCUSS/DSCN MKR DOCD: CPT

## 2023-05-04 PROCEDURE — 3075F SYST BP GE 130 - 139MM HG: CPT

## 2023-05-04 PROCEDURE — 3017F COLORECTAL CA SCREEN DOC REV: CPT

## 2023-05-04 PROCEDURE — 99213 OFFICE O/P EST LOW 20 MIN: CPT

## 2023-05-04 RX ORDER — TRIAMCINOLONE ACETONIDE 0.25 MG/G
CREAM TOPICAL
Qty: 15 G | Refills: 0 | Status: SHIPPED | OUTPATIENT
Start: 2023-05-04

## 2023-05-04 ASSESSMENT — ENCOUNTER SYMPTOMS: COLOR CHANGE: 1

## 2023-05-04 NOTE — PROGRESS NOTES
Yarelis Jung (:  1955) is a 77 y.o. female,Established patient, here for evaluation of the following chief complaint(s):  Skin Problem (Left elbow- dark and blotchy down forearm)          Subjective   SUBJECTIVE/OBJECTIVE:  Pt here today for skin problem  VSS    Pt reports she had noticed a callus and dry patch to her left elbow that she was applying topical diaper cream to to help moisturize  Today after she showered she noticed that the patch appeared darker and she had some mild skin blotchiness extending down her forearm which caused her alarm    She denies pain, no pruritis, no sensation changes, no swelling      Review of Systems   Constitutional:  Negative for chills and fever. Musculoskeletal:  Negative for arthralgias, joint swelling and myalgias. Skin:  Positive for color change. Negative for pallor, rash and wound. Objective   Physical Exam  Vitals and nursing note reviewed. Constitutional:       General: She is not in acute distress. Appearance: Normal appearance. She is not ill-appearing, toxic-appearing or diaphoretic. Musculoskeletal:      Right elbow: Normal.      Left elbow: Normal. No swelling, deformity or effusion. Normal range of motion. No tenderness. Skin:     General: Skin is warm and dry. Comments: Dry patch to elbow, mild hypopigmentation to skin surrounding   Neurological:      Mental Status: She is alert. ASSESSMENT/PLAN:  1. Dry skin  -uncertain etiology, likely callus  -encouraged to continue to apply moisturizing cream along w/ topical Kenalog    -     triamcinolone (KENALOG) 0.025 % cream; Apply topically 2 times daily. , Disp-15 g, R-0, Normal    2. Essential hypertension  -well controlled, continue current medications      Return if symptoms worsen or fail to improve. An electronic signature was used to authenticate this note.     --Bessie Smith, KATHYA - CNP

## 2023-05-16 DIAGNOSIS — R73.9 HYPERGLYCEMIA: ICD-10-CM

## 2023-05-16 DIAGNOSIS — E78.5 DYSLIPIDEMIA: ICD-10-CM

## 2023-05-16 LAB
ALBUMIN SERPL-MCNC: 4 G/DL
ALP BLD-CCNC: 101 U/L
ALT SERPL-CCNC: 19 U/L
ANION GAP SERPL CALCULATED.3IONS-SCNC: 8 MMOL/L
AST SERPL-CCNC: 20 U/L
AVERAGE GLUCOSE: 105
BILIRUB SERPL-MCNC: 1 MG/DL (ref 0.1–1.4)
BUN BLDV-MCNC: 11 MG/DL
CALCIUM SERPL-MCNC: 9.4 MG/DL
CHLORIDE BLD-SCNC: 101 MMOL/L
CHOLESTEROL, FASTING: 178
CO2: 29 MMOL/L
CREAT SERPL-MCNC: 0.77 MG/DL
EGFR: 83
GLUCOSE BLD-MCNC: 85 MG/DL
HBA1C MFR BLD: 5.3 %
HDLC SERPL-MCNC: 49 MG/DL (ref 35–70)
LDL CHOLESTEROL CALCULATED: 94 MG/DL (ref 0–160)
POTASSIUM SERPL-SCNC: 3.8 MMOL/L
SODIUM BLD-SCNC: 138 MMOL/L
TOTAL PROTEIN: 7.2
TRIGLYCERIDE, FASTING: 177
TSH SERPL DL<=0.05 MIU/L-ACNC: 1.64 UIU/ML

## 2023-05-18 ENCOUNTER — OFFICE VISIT (OUTPATIENT)
Dept: FAMILY MEDICINE CLINIC | Age: 68
End: 2023-05-18
Payer: MEDICARE

## 2023-05-18 VITALS
BODY MASS INDEX: 41.71 KG/M2 | OXYGEN SATURATION: 98 % | TEMPERATURE: 97.7 F | WEIGHT: 210 LBS | DIASTOLIC BLOOD PRESSURE: 64 MMHG | SYSTOLIC BLOOD PRESSURE: 140 MMHG | HEART RATE: 94 BPM

## 2023-05-18 DIAGNOSIS — Z23 IMMUNIZATION DUE: ICD-10-CM

## 2023-05-18 DIAGNOSIS — Z71.89 ADVANCE CARE PLANNING: ICD-10-CM

## 2023-05-18 DIAGNOSIS — Z00.00 MEDICARE ANNUAL WELLNESS VISIT, SUBSEQUENT: Primary | ICD-10-CM

## 2023-05-18 PROCEDURE — 90677 PCV20 VACCINE IM: CPT | Performed by: NURSE PRACTITIONER

## 2023-05-18 PROCEDURE — G0009 ADMIN PNEUMOCOCCAL VACCINE: HCPCS | Performed by: NURSE PRACTITIONER

## 2023-05-18 PROCEDURE — 3078F DIAST BP <80 MM HG: CPT | Performed by: NURSE PRACTITIONER

## 2023-05-18 PROCEDURE — G0439 PPPS, SUBSEQ VISIT: HCPCS | Performed by: NURSE PRACTITIONER

## 2023-05-18 PROCEDURE — 3017F COLORECTAL CA SCREEN DOC REV: CPT | Performed by: NURSE PRACTITIONER

## 2023-05-18 PROCEDURE — 1123F ACP DISCUSS/DSCN MKR DOCD: CPT | Performed by: NURSE PRACTITIONER

## 2023-05-18 PROCEDURE — 3074F SYST BP LT 130 MM HG: CPT | Performed by: NURSE PRACTITIONER

## 2023-05-18 SDOH — HEALTH STABILITY: PHYSICAL HEALTH: ON AVERAGE, HOW MANY DAYS PER WEEK DO YOU ENGAGE IN MODERATE TO STRENUOUS EXERCISE (LIKE A BRISK WALK)?: 0 DAYS

## 2023-05-18 ASSESSMENT — LIFESTYLE VARIABLES
HOW OFTEN DO YOU HAVE SIX OR MORE DRINKS ON ONE OCCASION: 1
HOW MANY STANDARD DRINKS CONTAINING ALCOHOL DO YOU HAVE ON A TYPICAL DAY: 0
HOW OFTEN DO YOU HAVE A DRINK CONTAINING ALCOHOL: NEVER
HOW OFTEN DO YOU HAVE A DRINK CONTAINING ALCOHOL: 1
HOW MANY STANDARD DRINKS CONTAINING ALCOHOL DO YOU HAVE ON A TYPICAL DAY: PATIENT DOES NOT DRINK

## 2023-05-18 ASSESSMENT — PATIENT HEALTH QUESTIONNAIRE - PHQ9
SUM OF ALL RESPONSES TO PHQ QUESTIONS 1-9: 0
1. LITTLE INTEREST OR PLEASURE IN DOING THINGS: 0
SUM OF ALL RESPONSES TO PHQ9 QUESTIONS 1 & 2: 0
2. FEELING DOWN, DEPRESSED OR HOPELESS: 0
SUM OF ALL RESPONSES TO PHQ QUESTIONS 1-9: 0

## 2023-05-19 ENCOUNTER — CLINICAL DOCUMENTATION (OUTPATIENT)
Dept: SPIRITUAL SERVICES | Age: 68
End: 2023-05-19

## 2023-05-19 NOTE — ACP (ADVANCE CARE PLANNING)
Advance Care Planning   Ambulatory ACP Specialist Patient Outreach    Date:  5/19/2023    ACP Specialist:  Darien Back    Outreach call to patient in follow-up to ACP Specialist referral from:KATHYA Salazar CNP    [x] PCP  [] Provider   [] Ambulatory Care Management [] Other     For:                  [x] Advance Directive Assistance              [] Complete Portable DNR order              [] Complete POST/POLST/MOST              [] Code Status Discussion             [] Discuss Goals of Care             [] Early ACP Decision-Making              [] Other (Specify)    Date Referral Received:5/18/23    Next Step:   [] ACP scheduled conversation  [x] Outreach again in one week               [] Email / Mail [address] [place]  [] Email / Mail Advance Directive   [] Closing referral.  Routing closure to referring provider/staff and to ACP Specialist . [] Closure letter mailed to patient with invitation to contact ACP Specialist if / when ready. [] Other (Specify here): Outreaches:       [x] 1st -  Date:  5/19/23               Intervention:  [] Spoke with Patient   [x] Left Voice mail [] Email / Mail    [] [employer]  [] Other [id number]) : Outcomes: Left detailed VM for Patient to return call. Sent Email w/ACP Packet included. [] 2nd -  Date:                 Intervention:  [] Spoke with Patient  [] Left Voice mail [] Email / Mail    [] [employer]  [] Other [id number]) : Outcomes:                [] 3rd -  Date:                Intervention:  [] Spoke with Patient   [] Left Voice mail [] Email / Mail    [] [employer]  [] Other [id number]) : Outcomes:           []  Additional Outreach -  Date:     (Specify Dates & special circumstances): Outcomes:          Thank you for this referral.

## 2023-06-09 DIAGNOSIS — Z76.0 MEDICATION REFILL: ICD-10-CM

## 2023-06-09 RX ORDER — POTASSIUM CHLORIDE 750 MG/1
10 CAPSULE, EXTENDED RELEASE ORAL 2 TIMES DAILY
Qty: 60 CAPSULE | Refills: 2 | Status: SHIPPED | OUTPATIENT
Start: 2023-06-09

## 2023-06-09 NOTE — TELEPHONE ENCOUNTER
Patient asking for 90 day supply        Kurt Duque is calling to request a refill on the following medication(s):    Medication Request:  Requested Prescriptions     Pending Prescriptions Disp Refills    potassium chloride (MICRO-K) 10 MEQ extended release capsule 60 capsule 2     Sig: Take 1 capsule by mouth 2 times daily       Last Visit Date (If Applicable):  2/45/8065    Next Visit Date:    11/20/2023

## 2023-08-21 DIAGNOSIS — Z76.0 MEDICATION REFILL: ICD-10-CM

## 2023-08-21 RX ORDER — HYDROCHLOROTHIAZIDE 50 MG/1
50 TABLET ORAL DAILY
Qty: 90 TABLET | Refills: 1 | Status: SHIPPED | OUTPATIENT
Start: 2023-08-21

## 2023-08-21 RX ORDER — LISINOPRIL 5 MG/1
5 TABLET ORAL DAILY
Qty: 90 TABLET | Refills: 1 | Status: SHIPPED | OUTPATIENT
Start: 2023-08-21

## 2023-08-21 RX ORDER — PANTOPRAZOLE SODIUM 40 MG/1
TABLET, DELAYED RELEASE ORAL
Qty: 90 TABLET | Refills: 1 | Status: SHIPPED | OUTPATIENT
Start: 2023-08-21

## 2023-08-21 NOTE — TELEPHONE ENCOUNTER
Katharina Johnson called requesting a refill of the below medication which has been pended for you:     Requested Prescriptions     Pending Prescriptions Disp Refills    levothyroxine (SYNTHROID) 25 MCG tablet [Pharmacy Med Name: LEVOTHYROXINE SODIUM 25MCG TABLET] 44 tablet 5     Sig: TAKE 1 TABLET 3 DAYS A WEEK AND TAKE 2 TABLETS 4 DAYS A WEEK    LORazepam (ATIVAN) 1 MG tablet [Pharmacy Med Name: LORAZEPAM 1MG TABLET] 90 tablet 0     Sig: TAKE 1 TABLET EVERY 8 HOURS AS NEEDED FOR ANXIETY.        Last Appointment Date: 4/11/2023  Next Appointment Date: 7/18/2023    Allergies   Allergen Reactions    Biaxin [Clarithromycin]     Cefdinir     Effexor [Venlafaxine]     Erythromycin     Naprosyn [Naproxen] Tika Gamez is calling to request a refill on the following medication(s):    Medication Request:  Requested Prescriptions     Pending Prescriptions Disp Refills    lisinopril (PRINIVIL;ZESTRIL) 5 MG tablet 90 tablet 1     Sig: Take 1 tablet by mouth daily    hydroCHLOROthiazide (HYDRODIURIL) 50 MG tablet 90 tablet 1     Sig: Take 1 tablet by mouth daily    pantoprazole (PROTONIX) 40 MG tablet 90 tablet 1     Sig: TAKE 1 TABLET EVERY MORNINGBEFORE BREAKFAST       Last Visit Date (If Applicable):  0/44/0403    Next Visit Date:    11/20/2023

## 2023-09-26 DIAGNOSIS — Z76.0 MEDICATION REFILL: ICD-10-CM

## 2023-09-26 RX ORDER — POTASSIUM CHLORIDE 750 MG/1
10 CAPSULE, EXTENDED RELEASE ORAL 2 TIMES DAILY
Qty: 180 CAPSULE | Refills: 1 | Status: SHIPPED | OUTPATIENT
Start: 2023-09-26 | End: 2024-03-24

## 2023-09-26 RX ORDER — POTASSIUM CHLORIDE 750 MG/1
10 CAPSULE, EXTENDED RELEASE ORAL 2 TIMES DAILY
Qty: 60 CAPSULE | Refills: 0 | OUTPATIENT
Start: 2023-09-26

## 2023-09-26 NOTE — TELEPHONE ENCOUNTER
Please send to [facility] for 90 days Thank you !       Hortensia Ohara is calling to request a refill on the following medication(s):    Medication Request:  Requested Prescriptions     Pending Prescriptions Disp Refills    potassium chloride (MICRO-K) 10 MEQ extended release capsule 60 capsule 2     Sig: Take 1 capsule by mouth 2 times daily       Last Visit Date (If Applicable):  4/88/1841    Next Visit Date:    11/20/2023

## 2023-10-23 ENCOUNTER — OFFICE VISIT (OUTPATIENT)
Dept: FAMILY MEDICINE CLINIC | Age: 68
End: 2023-10-23
Payer: MEDICARE

## 2023-10-23 VITALS
WEIGHT: 215 LBS | TEMPERATURE: 97 F | OXYGEN SATURATION: 96 % | BODY MASS INDEX: 42.7 KG/M2 | SYSTOLIC BLOOD PRESSURE: 130 MMHG | HEART RATE: 92 BPM | DIASTOLIC BLOOD PRESSURE: 80 MMHG

## 2023-10-23 DIAGNOSIS — J06.9 VIRAL URI: Primary | ICD-10-CM

## 2023-10-23 PROCEDURE — 99213 OFFICE O/P EST LOW 20 MIN: CPT | Performed by: NURSE PRACTITIONER

## 2023-10-23 PROCEDURE — G8399 PT W/DXA RESULTS DOCUMENT: HCPCS | Performed by: NURSE PRACTITIONER

## 2023-10-23 PROCEDURE — G8417 CALC BMI ABV UP PARAM F/U: HCPCS | Performed by: NURSE PRACTITIONER

## 2023-10-23 PROCEDURE — 1036F TOBACCO NON-USER: CPT | Performed by: NURSE PRACTITIONER

## 2023-10-23 PROCEDURE — 3079F DIAST BP 80-89 MM HG: CPT | Performed by: NURSE PRACTITIONER

## 2023-10-23 PROCEDURE — 1090F PRES/ABSN URINE INCON ASSESS: CPT | Performed by: NURSE PRACTITIONER

## 2023-10-23 PROCEDURE — 1123F ACP DISCUSS/DSCN MKR DOCD: CPT | Performed by: NURSE PRACTITIONER

## 2023-10-23 PROCEDURE — 3017F COLORECTAL CA SCREEN DOC REV: CPT | Performed by: NURSE PRACTITIONER

## 2023-10-23 PROCEDURE — G8427 DOCREV CUR MEDS BY ELIG CLIN: HCPCS | Performed by: NURSE PRACTITIONER

## 2023-10-23 PROCEDURE — G8484 FLU IMMUNIZE NO ADMIN: HCPCS | Performed by: NURSE PRACTITIONER

## 2023-10-23 PROCEDURE — 3075F SYST BP GE 130 - 139MM HG: CPT | Performed by: NURSE PRACTITIONER

## 2023-10-23 ASSESSMENT — PATIENT HEALTH QUESTIONNAIRE - PHQ9
SUM OF ALL RESPONSES TO PHQ QUESTIONS 1-9: 0
SUM OF ALL RESPONSES TO PHQ QUESTIONS 1-9: 0
2. FEELING DOWN, DEPRESSED OR HOPELESS: 0
SUM OF ALL RESPONSES TO PHQ QUESTIONS 1-9: 0
SUM OF ALL RESPONSES TO PHQ QUESTIONS 1-9: 0
1. LITTLE INTEREST OR PLEASURE IN DOING THINGS: 0
SUM OF ALL RESPONSES TO PHQ9 QUESTIONS 1 & 2: 0

## 2023-10-23 ASSESSMENT — ENCOUNTER SYMPTOMS
COUGH: 1
SORE THROAT: 1
RHINORRHEA: 1

## 2023-10-24 ENCOUNTER — TELEPHONE (OUTPATIENT)
Dept: FAMILY MEDICINE CLINIC | Age: 68
End: 2023-10-24

## 2023-10-24 NOTE — TELEPHONE ENCOUNTER
Patient saw you 10/23/23 She is using her Nebulizer every 4-6 hours. Patient wants to know if she can use her rescue inhaler-Ventolin in between using her nebulizer because she starts having a coughing jag?     I told patient that's probably due to using her nebulizer that is loosening mucus up to cough out    Please advise

## 2023-10-24 NOTE — TELEPHONE ENCOUNTER
It wouldn't do any good because her nebulizer already has albuterol in it. The nebulizer works better than the inhaler.

## 2023-12-12 ENCOUNTER — NURSE ONLY (OUTPATIENT)
Dept: FAMILY MEDICINE CLINIC | Age: 68
End: 2023-12-12

## 2023-12-27 ENCOUNTER — TELEPHONE (OUTPATIENT)
Dept: FAMILY MEDICINE CLINIC | Age: 68
End: 2023-12-27

## 2023-12-27 NOTE — TELEPHONE ENCOUNTER
These are seasonal immunizations for her age group so both are recommended. There are no contraindications to this.

## 2023-12-27 NOTE — TELEPHONE ENCOUNTER
Patient leaving out of town 01/13/24 and would like to get her RSV and Covid before leaving.  How do you feel about this?

## 2024-01-29 DIAGNOSIS — Z76.0 MEDICATION REFILL: ICD-10-CM

## 2024-01-29 RX ORDER — PANTOPRAZOLE SODIUM 40 MG/1
TABLET, DELAYED RELEASE ORAL
Qty: 90 TABLET | Refills: 1 | Status: SHIPPED | OUTPATIENT
Start: 2024-01-29

## 2024-01-29 RX ORDER — POTASSIUM CHLORIDE 750 MG/1
10 CAPSULE, EXTENDED RELEASE ORAL 2 TIMES DAILY
Qty: 180 CAPSULE | Refills: 1 | Status: SHIPPED | OUTPATIENT
Start: 2024-01-29 | End: 2024-07-27

## 2024-01-29 RX ORDER — HYDROCHLOROTHIAZIDE 50 MG/1
50 TABLET ORAL DAILY
Qty: 90 TABLET | Refills: 1 | Status: SHIPPED | OUTPATIENT
Start: 2024-01-29

## 2024-01-29 RX ORDER — LISINOPRIL 5 MG/1
5 TABLET ORAL DAILY
Qty: 90 TABLET | Refills: 1 | Status: SHIPPED | OUTPATIENT
Start: 2024-01-29

## 2024-01-29 NOTE — TELEPHONE ENCOUNTER
Nerissa Cosme is calling to request a refill on the following medication(s):    Medication Request:  Requested Prescriptions     Pending Prescriptions Disp Refills    hydroCHLOROthiazide (HYDRODIURIL) 50 MG tablet 90 tablet 1     Sig: Take 1 tablet by mouth daily    lisinopril (PRINIVIL;ZESTRIL) 5 MG tablet 90 tablet 1     Sig: Take 1 tablet by mouth daily    potassium chloride (MICRO-K) 10 MEQ extended release capsule 180 capsule 1     Sig: Take 1 capsule by mouth 2 times daily    pantoprazole (PROTONIX) 40 MG tablet 90 tablet 1     Sig: TAKE 1 TABLET EVERY MORNINGBEFORE BREAKFAST       Last Visit Date (If Applicable):  10/23/2023    Next Visit Date:    Visit date not found

## 2024-02-12 ENCOUNTER — OFFICE VISIT (OUTPATIENT)
Dept: FAMILY MEDICINE CLINIC | Age: 69
End: 2024-02-12

## 2024-02-12 VITALS
SYSTOLIC BLOOD PRESSURE: 162 MMHG | HEART RATE: 104 BPM | BODY MASS INDEX: 41.78 KG/M2 | OXYGEN SATURATION: 97 % | TEMPERATURE: 96.8 F | DIASTOLIC BLOOD PRESSURE: 84 MMHG | WEIGHT: 210.4 LBS

## 2024-02-12 DIAGNOSIS — R00.0 TACHYCARDIA: ICD-10-CM

## 2024-02-12 DIAGNOSIS — I10 ESSENTIAL HYPERTENSION: ICD-10-CM

## 2024-02-12 DIAGNOSIS — R10.31 GROIN PAIN, RIGHT: Primary | ICD-10-CM

## 2024-02-12 DIAGNOSIS — R10.2 PELVIC PAIN: ICD-10-CM

## 2024-02-12 DIAGNOSIS — R06.09 DYSPNEA ON EXERTION: ICD-10-CM

## 2024-02-12 DIAGNOSIS — J45.20 MILD INTERMITTENT ASTHMA WITHOUT COMPLICATION: ICD-10-CM

## 2024-02-12 LAB
ALBUMIN SERPL-MCNC: 4.2 G/DL
ALP BLD-CCNC: 124 U/L
ALT SERPL-CCNC: 31 U/L
ANION GAP SERPL CALCULATED.3IONS-SCNC: 9 MMOL/L
AST SERPL-CCNC: 30 U/L
BASOPHILS ABSOLUTE: 0.1 /ΜL
BASOPHILS RELATIVE PERCENT: 1.1 %
BILIRUB SERPL-MCNC: 0.8 MG/DL (ref 0.1–1.4)
BUN BLDV-MCNC: 14 MG/DL
CALCIUM SERPL-MCNC: 9.9 MG/DL
CHLORIDE BLD-SCNC: 101 MMOL/L
CO2: 28 MMOL/L
CREAT SERPL-MCNC: 0.75 MG/DL
EGFR: 86
EOSINOPHILS ABSOLUTE: 0.3 /ΜL
EOSINOPHILS RELATIVE PERCENT: 5.2 %
GLUCOSE BLD-MCNC: 92 MG/DL
HCT VFR BLD CALC: 41 % (ref 36–46)
HEMOGLOBIN: 13.7 G/DL (ref 12–16)
LYMPHOCYTES ABSOLUTE: 1.5 /ΜL
LYMPHOCYTES RELATIVE PERCENT: 25.4 %
MCH RBC QN AUTO: 28.3 PG
MCHC RBC AUTO-ENTMCNC: 33.6 G/DL
MCV RBC AUTO: 85 FL
MONOCYTES ABSOLUTE: 0.4 /ΜL
MONOCYTES RELATIVE PERCENT: 6.7 %
NEUTROPHILS ABSOLUTE: 3.7 /ΜL
NEUTROPHILS RELATIVE PERCENT: 61.6 %
PDW BLD-RTO: 14 %
PLATELET # BLD: 306 K/ΜL
PMV BLD AUTO: 8.6 FL
POTASSIUM SERPL-SCNC: 3.7 MMOL/L
RBC # BLD: 4.85 10^6/ΜL
SODIUM BLD-SCNC: 138 MMOL/L
TOTAL PROTEIN: 7.6
TSH SERPL DL<=0.05 MIU/L-ACNC: 1.49 UIU/ML
WBC # BLD: 6.1 10^3/ML

## 2024-02-12 RX ORDER — METOPROLOL SUCCINATE 25 MG/1
12.5 TABLET, EXTENDED RELEASE ORAL DAILY
Qty: 30 TABLET | Refills: 0 | Status: SHIPPED | OUTPATIENT
Start: 2024-02-12

## 2024-02-12 NOTE — PROGRESS NOTES
Nerissa Cosme (:  1955) is a 68 y.o. female,Established patient, here for evaluation of the following chief complaint(s):  Pain (Stabbing pain in pelvis area)          Subjective   SUBJECTIVE/OBJECTIVE:  Pt here today for pelvic pain  BP and HR elevated, does not monitor at home    Pt reports she has had right sided pelvic/groin pain for the last 2 weeks  Denies acute injury  She is caring for her young grandchildren twice weekly now and is uncertain if the frequent bending and lower to ground movement is causing this  Pain is worse w/ ambulation and weight bearing, she is taking PRN tylenol for sx w/ mild relief    Additional concerns r/t her dyspnea w/ mild exertion over the last few months  Pt follows w/ pulm and is changing management of asthma regimen  She denies wheezing, no CP  SOB is specifically paired w/ exertion when her HR elevates  Denies palpitations  Stress and ECHO completed 2023 were both WNL, no longer follows w/ cardiology        Review of Systems       Objective   Physical Exam  Vitals and nursing note reviewed.   Constitutional:       General: She is not in acute distress.     Appearance: Normal appearance. She is obese. She is not ill-appearing, toxic-appearing or diaphoretic.   Neck:      Vascular: No carotid bruit.   Cardiovascular:      Rate and Rhythm: Regular rhythm. Tachycardia present.      Pulses: Normal pulses.      Heart sounds: Normal heart sounds.   Pulmonary:      Effort: Pulmonary effort is normal.      Breath sounds: Normal breath sounds.   Musculoskeletal:      Right hip: Tenderness present. No bony tenderness. Normal range of motion.      Right lower leg: No edema.      Left lower leg: No edema.   Skin:     General: Skin is warm and dry.   Neurological:      Mental Status: She is alert.                 ASSESSMENT/PLAN:  1. Groin pain, right  2. Pelvic pain  -sounds arthritic in nature  -xray for eval d/t ongoing discomfort and pain w/ ambulation    -     XR HIP

## 2024-02-13 LAB — MAGNESIUM: 1.8 MG/DL

## 2024-02-14 DIAGNOSIS — R10.31 GROIN PAIN, RIGHT: ICD-10-CM

## 2024-02-14 DIAGNOSIS — R10.2 PELVIC PAIN: Primary | ICD-10-CM

## 2024-02-14 DIAGNOSIS — R00.0 TACHYCARDIA: ICD-10-CM

## 2024-02-14 DIAGNOSIS — R10.2 PELVIC PAIN: ICD-10-CM

## 2024-02-15 ENCOUNTER — TELEPHONE (OUTPATIENT)
Dept: FAMILY MEDICINE CLINIC | Age: 69
End: 2024-02-15

## 2024-02-16 NOTE — TELEPHONE ENCOUNTER
Paged by call service for patient having trouble breathing today. Called patient but no answer and after ringing for a while there was no option for voicemail. Will have office contact patient in AM to follow up

## 2024-02-19 ENCOUNTER — TELEPHONE (OUTPATIENT)
Dept: FAMILY MEDICINE CLINIC | Age: 69
End: 2024-02-19

## 2024-02-19 ENCOUNTER — OFFICE VISIT (OUTPATIENT)
Dept: FAMILY MEDICINE CLINIC | Age: 69
End: 2024-02-19
Payer: MEDICARE

## 2024-02-19 VITALS
DIASTOLIC BLOOD PRESSURE: 70 MMHG | BODY MASS INDEX: 40.63 KG/M2 | SYSTOLIC BLOOD PRESSURE: 132 MMHG | TEMPERATURE: 96.7 F | OXYGEN SATURATION: 96 % | WEIGHT: 204.6 LBS | HEART RATE: 108 BPM

## 2024-02-19 DIAGNOSIS — R06.09 DYSPNEA ON EXERTION: ICD-10-CM

## 2024-02-19 DIAGNOSIS — I10 ESSENTIAL HYPERTENSION: ICD-10-CM

## 2024-02-19 DIAGNOSIS — R00.0 TACHYCARDIA: ICD-10-CM

## 2024-02-19 DIAGNOSIS — Z09 HOSPITAL DISCHARGE FOLLOW-UP: Primary | ICD-10-CM

## 2024-02-19 DIAGNOSIS — J81.0 ACUTE PULMONARY EDEMA (HCC): ICD-10-CM

## 2024-02-19 PROCEDURE — 3017F COLORECTAL CA SCREEN DOC REV: CPT | Performed by: NURSE PRACTITIONER

## 2024-02-19 PROCEDURE — G8427 DOCREV CUR MEDS BY ELIG CLIN: HCPCS | Performed by: NURSE PRACTITIONER

## 2024-02-19 PROCEDURE — 99214 OFFICE O/P EST MOD 30 MIN: CPT | Performed by: NURSE PRACTITIONER

## 2024-02-19 PROCEDURE — 1036F TOBACCO NON-USER: CPT | Performed by: NURSE PRACTITIONER

## 2024-02-19 PROCEDURE — 3075F SYST BP GE 130 - 139MM HG: CPT | Performed by: NURSE PRACTITIONER

## 2024-02-19 PROCEDURE — 1090F PRES/ABSN URINE INCON ASSESS: CPT | Performed by: NURSE PRACTITIONER

## 2024-02-19 PROCEDURE — G8417 CALC BMI ABV UP PARAM F/U: HCPCS | Performed by: NURSE PRACTITIONER

## 2024-02-19 PROCEDURE — 1111F DSCHRG MED/CURRENT MED MERGE: CPT | Performed by: NURSE PRACTITIONER

## 2024-02-19 PROCEDURE — G8484 FLU IMMUNIZE NO ADMIN: HCPCS | Performed by: NURSE PRACTITIONER

## 2024-02-19 PROCEDURE — 1123F ACP DISCUSS/DSCN MKR DOCD: CPT | Performed by: NURSE PRACTITIONER

## 2024-02-19 PROCEDURE — G8399 PT W/DXA RESULTS DOCUMENT: HCPCS | Performed by: NURSE PRACTITIONER

## 2024-02-19 PROCEDURE — 3078F DIAST BP <80 MM HG: CPT | Performed by: NURSE PRACTITIONER

## 2024-02-19 RX ORDER — METOPROLOL SUCCINATE 25 MG/1
25 TABLET, EXTENDED RELEASE ORAL DAILY
Qty: 30 TABLET | Refills: 2 | Status: SHIPPED | OUTPATIENT
Start: 2024-02-19

## 2024-02-19 RX ORDER — METOPROLOL SUCCINATE 25 MG/1
25 TABLET, EXTENDED RELEASE ORAL DAILY
Qty: 30 TABLET | Refills: 0 | Status: SHIPPED
Start: 2024-02-19 | End: 2024-02-19 | Stop reason: SDUPTHER

## 2024-02-19 ASSESSMENT — PATIENT HEALTH QUESTIONNAIRE - PHQ9
2. FEELING DOWN, DEPRESSED OR HOPELESS: 0
1. LITTLE INTEREST OR PLEASURE IN DOING THINGS: 0
SUM OF ALL RESPONSES TO PHQ QUESTIONS 1-9: 0
SUM OF ALL RESPONSES TO PHQ QUESTIONS 1-9: 0
SUM OF ALL RESPONSES TO PHQ9 QUESTIONS 1 & 2: 0
SUM OF ALL RESPONSES TO PHQ QUESTIONS 1-9: 0
SUM OF ALL RESPONSES TO PHQ QUESTIONS 1-9: 0

## 2024-02-19 ASSESSMENT — ENCOUNTER SYMPTOMS: SHORTNESS OF BREATH: 1

## 2024-02-19 NOTE — PROGRESS NOTES
Post-Discharge Transitional Care Follow Up    Nerissa Cosme   YOB: 1955    Date of Office Visit:  2/19/2024  Date of Hospital Admission: 2/15/2024  Date of Hospital Discharge: 2/16/2024    Care management risk score Rising risk (score 2-5) and Complex Care (Scores >=6): No Risk Score On File     Non face to face  following discharge, date last encounter closed (first attempt may have been earlier): *No documented post hospital discharge outreach found in the last 14 days     Call initiated 2 business days of discharge: *No response recorded in the last 14 days    ASSESSMENT/PLAN:   Hospital discharge follow-up  -     MO DISCHARGE MEDS RECONCILED W/ CURRENT OUTPATIENT MED LIST  Acute pulmonary edema (HCC)  -     Basic Metabolic Panel; Future  -     Will check e lytes and kidney function  -     Continue Lasix until complete  -     Keep pulmonary appt  -     Will follow up in 1 week on bp and hr  Essential hypertension  -     metoprolol succinate (TOPROL XL) 25 MG extended release tablet; Take 1 tablet by mouth daily, Disp-30 tablet, R-2Normal  Tachycardia  -     metoprolol succinate (TOPROL XL) 25 MG extended release tablet; Take 1 tablet by mouth daily, Disp-30 tablet, R-2Normal  -      Will increase Toprol to 25 mg daily  Dyspnea on exertion  -     metoprolol succinate (TOPROL XL) 25 MG extended release tablet; Take 1 tablet by mouth daily, Disp-30 tablet, R-2Normal    Medical Decision Making: moderate complexity  Return in about 1 week (around 2/26/2024) for f/u HR/bp.    On this date 2/19/2024 I have spent 35 minutes reviewing previous notes, test results and face to face with the patient discussing the diagnosis and importance of compliance with the treatment plan as well as documenting on the day of the visit.       Subjective:   HPI    Inpatient course: Discharge summary reviewed- see chart.    Interval history/Current status:     Patient reports feeling better, still a little short of breath.

## 2024-02-19 NOTE — TELEPHONE ENCOUNTER
Patient calling asking to schedule appointment with you. She states that when she looked on [facility] it states that she asked for services to be d/c'ed. She states she doesn't remember doing that.

## 2024-02-22 DIAGNOSIS — J81.0 ACUTE PULMONARY EDEMA (HCC): ICD-10-CM

## 2024-02-22 LAB
BUN BLDV-MCNC: 21 MG/DL
CALCIUM SERPL-MCNC: 10.4 MG/DL
CHLORIDE BLD-SCNC: 98 MMOL/L
CO2: 26 MMOL/L
CREAT SERPL-MCNC: 0.94 MG/DL
EGFR: 65
GLUCOSE BLD-MCNC: 113 MG/DL
POTASSIUM SERPL-SCNC: 3.6 MMOL/L
SODIUM BLD-SCNC: 136 MMOL/L

## 2024-02-26 ENCOUNTER — OFFICE VISIT (OUTPATIENT)
Dept: FAMILY MEDICINE CLINIC | Age: 69
End: 2024-02-26
Payer: MEDICARE

## 2024-02-26 VITALS
SYSTOLIC BLOOD PRESSURE: 114 MMHG | OXYGEN SATURATION: 97 % | TEMPERATURE: 97 F | BODY MASS INDEX: 41.11 KG/M2 | HEART RATE: 110 BPM | WEIGHT: 207 LBS | DIASTOLIC BLOOD PRESSURE: 68 MMHG

## 2024-02-26 DIAGNOSIS — I10 ESSENTIAL HYPERTENSION: ICD-10-CM

## 2024-02-26 DIAGNOSIS — Z09 FOLLOW-UP EXAM AFTER TREATMENT: Primary | ICD-10-CM

## 2024-02-26 DIAGNOSIS — R00.0 TACHYCARDIA: ICD-10-CM

## 2024-02-26 DIAGNOSIS — J81.0 ACUTE PULMONARY EDEMA (HCC): ICD-10-CM

## 2024-02-26 PROCEDURE — G8417 CALC BMI ABV UP PARAM F/U: HCPCS | Performed by: NURSE PRACTITIONER

## 2024-02-26 PROCEDURE — 3078F DIAST BP <80 MM HG: CPT | Performed by: NURSE PRACTITIONER

## 2024-02-26 PROCEDURE — 1090F PRES/ABSN URINE INCON ASSESS: CPT | Performed by: NURSE PRACTITIONER

## 2024-02-26 PROCEDURE — 1123F ACP DISCUSS/DSCN MKR DOCD: CPT | Performed by: NURSE PRACTITIONER

## 2024-02-26 PROCEDURE — 3074F SYST BP LT 130 MM HG: CPT | Performed by: NURSE PRACTITIONER

## 2024-02-26 PROCEDURE — 3017F COLORECTAL CA SCREEN DOC REV: CPT | Performed by: NURSE PRACTITIONER

## 2024-02-26 PROCEDURE — G8399 PT W/DXA RESULTS DOCUMENT: HCPCS | Performed by: NURSE PRACTITIONER

## 2024-02-26 PROCEDURE — G8427 DOCREV CUR MEDS BY ELIG CLIN: HCPCS | Performed by: NURSE PRACTITIONER

## 2024-02-26 PROCEDURE — G8484 FLU IMMUNIZE NO ADMIN: HCPCS | Performed by: NURSE PRACTITIONER

## 2024-02-26 PROCEDURE — 1036F TOBACCO NON-USER: CPT | Performed by: NURSE PRACTITIONER

## 2024-02-26 PROCEDURE — 99214 OFFICE O/P EST MOD 30 MIN: CPT | Performed by: NURSE PRACTITIONER

## 2024-02-26 ASSESSMENT — PATIENT HEALTH QUESTIONNAIRE - PHQ9
SUM OF ALL RESPONSES TO PHQ QUESTIONS 1-9: 0
1. LITTLE INTEREST OR PLEASURE IN DOING THINGS: 0

## 2024-02-26 NOTE — PROGRESS NOTES
educational materials - see patient instructions  3.  Was a self-tracking handout given in paper form or via [facility]? No  If yes, see orders or list here.  4.  Discussed use, benefit, and side effects of prescribed medications.  Barriers to medication compliance addressed.  All patient questions answered.  Pt voiced understanding.   5.  Reviewed prior labs, imaging, consultation, follow up, and health maintenance  6.  Continue current medications, diet and exercise.  7. Discussed use, benefit, and side effects of prescribed medications.  Barriers to medication compliance addressed.  All her questions were answered.  Pt voiced understanding. Nerissa will continue current medications, diet and exercise.      Of the 30 minute duration appointment visit, Suzie Duckworth CNP spent at least 50% of the face-to-face time in counseling, explanation of diagnosis, planning of further management, and answering all questions.          Signed:  Suzie Duckworth CNP

## 2024-03-11 ENCOUNTER — OFFICE VISIT (OUTPATIENT)
Dept: FAMILY MEDICINE CLINIC | Age: 69
End: 2024-03-11
Payer: MEDICARE

## 2024-03-11 ENCOUNTER — TELEPHONE (OUTPATIENT)
Dept: FAMILY MEDICINE CLINIC | Age: 69
End: 2024-03-11

## 2024-03-11 VITALS
SYSTOLIC BLOOD PRESSURE: 110 MMHG | TEMPERATURE: 97.2 F | DIASTOLIC BLOOD PRESSURE: 60 MMHG | WEIGHT: 205 LBS | HEART RATE: 88 BPM | BODY MASS INDEX: 40.71 KG/M2 | OXYGEN SATURATION: 96 %

## 2024-03-11 DIAGNOSIS — E78.5 HYPERLIPIDEMIA, UNSPECIFIED HYPERLIPIDEMIA TYPE: Chronic | ICD-10-CM

## 2024-03-11 DIAGNOSIS — I10 ESSENTIAL HYPERTENSION: Primary | ICD-10-CM

## 2024-03-11 DIAGNOSIS — J45.20 MILD INTERMITTENT ASTHMA WITHOUT COMPLICATION: ICD-10-CM

## 2024-03-11 DIAGNOSIS — R06.09 DYSPNEA ON EXERTION: ICD-10-CM

## 2024-03-11 DIAGNOSIS — K21.9 GASTROESOPHAGEAL REFLUX DISEASE, UNSPECIFIED WHETHER ESOPHAGITIS PRESENT: Chronic | ICD-10-CM

## 2024-03-11 DIAGNOSIS — R00.0 TACHYCARDIA: ICD-10-CM

## 2024-03-11 PROCEDURE — 1090F PRES/ABSN URINE INCON ASSESS: CPT | Performed by: NURSE PRACTITIONER

## 2024-03-11 PROCEDURE — 3074F SYST BP LT 130 MM HG: CPT | Performed by: NURSE PRACTITIONER

## 2024-03-11 PROCEDURE — 3017F COLORECTAL CA SCREEN DOC REV: CPT | Performed by: NURSE PRACTITIONER

## 2024-03-11 PROCEDURE — 1036F TOBACCO NON-USER: CPT | Performed by: NURSE PRACTITIONER

## 2024-03-11 PROCEDURE — 3078F DIAST BP <80 MM HG: CPT | Performed by: NURSE PRACTITIONER

## 2024-03-11 PROCEDURE — 1123F ACP DISCUSS/DSCN MKR DOCD: CPT | Performed by: NURSE PRACTITIONER

## 2024-03-11 PROCEDURE — G8427 DOCREV CUR MEDS BY ELIG CLIN: HCPCS | Performed by: NURSE PRACTITIONER

## 2024-03-11 PROCEDURE — 99214 OFFICE O/P EST MOD 30 MIN: CPT | Performed by: NURSE PRACTITIONER

## 2024-03-11 PROCEDURE — G8484 FLU IMMUNIZE NO ADMIN: HCPCS | Performed by: NURSE PRACTITIONER

## 2024-03-11 PROCEDURE — G8417 CALC BMI ABV UP PARAM F/U: HCPCS | Performed by: NURSE PRACTITIONER

## 2024-03-11 PROCEDURE — G8399 PT W/DXA RESULTS DOCUMENT: HCPCS | Performed by: NURSE PRACTITIONER

## 2024-03-11 RX ORDER — METOPROLOL SUCCINATE 25 MG/1
25 TABLET, EXTENDED RELEASE ORAL DAILY
Qty: 90 TABLET | Refills: 2 | Status: SHIPPED | OUTPATIENT
Start: 2024-03-11 | End: 2024-12-06

## 2024-03-11 NOTE — PROGRESS NOTES
Suzie Duckworth, [facility]  [address]  [place]  [address]  [phone]  [phone]    Nerissa Cosme is a 68 y.o. female who is here with c/o of:    Chief Complaint: Other (Pt  in for Routine Visit per pt)      Patient Accompanied by: n/a    HPI - Nerissa Cosme is here today with c/o:    Patient here for re evaluation of chronic conditions.    HTN-  Compliant with medication. Averaging 110-120/60's for blood pressure at home. Denies chest pain, dizziness, shortness of breath, headaches or swelling    Tachycardia-  Improved with the Toprol XL    GERD-  Stable with medications    Health Concerns-  Recently was seen by Pulmonary for pulmonary edema. She will be having a sleep study. He will have her take Zyrtec and Nasal rinses to help. Reports that the pulmonary edema was due to her asthma. Would like her to lose weight and start exercise. She will be starting Breo. She was also told to follow low sodium diet and only 64 oz of liquids per day     Health Maintenance Due   Topic Date Due    Hepatitis B vaccine (2 of 3 - 19+ 3-dose series) 10/29/2018        Patient Active Problem List:     Asthma     Obesity     OA (osteoarthritis)     Hyperlipidemia     BPPV (benign paroxysmal positional vertigo)     GERD (gastroesophageal reflux disease)     Essential hypertension     Anaphylaxis     Other specified glaucoma     Adjustment disorder with anxiety     Other allergy status, other than to drugs and biological substances     Past Medical History:   Diagnosis Date    Asthma     Hypertension     OA (osteoarthritis)     Obesity       Past Surgical History:   Procedure Laterality Date     SECTION      x2    CHOLECYSTECTOMY      COLONOSCOPY      ; ; due     EAR SURGERY Left      Family History   Problem Relation Age of Onset    Asthma Mother     Liver Cancer Father     Hypertension Father      Social History     Tobacco Use    Smoking status: Never    Smokeless

## 2024-03-20 ENCOUNTER — HOSPITAL ENCOUNTER (OUTPATIENT)
Dept: CT IMAGING | Facility: CLINIC | Age: 69
Discharge: HOME | End: 2024-03-22
Payer: MEDICARE

## 2024-03-20 ENCOUNTER — OFFICE VISIT (OUTPATIENT)
Dept: FAMILY MEDICINE CLINIC | Age: 69
End: 2024-03-20

## 2024-03-20 ENCOUNTER — HOSPITAL ENCOUNTER (OUTPATIENT)
Facility: CLINIC | Age: 69
Discharge: HOME | End: 2024-03-20
Payer: MEDICARE

## 2024-03-20 VITALS
OXYGEN SATURATION: 96 % | TEMPERATURE: 96.8 F | WEIGHT: 206.6 LBS | HEART RATE: 99 BPM | BODY MASS INDEX: 41.03 KG/M2 | SYSTOLIC BLOOD PRESSURE: 118 MMHG | DIASTOLIC BLOOD PRESSURE: 60 MMHG

## 2024-03-20 DIAGNOSIS — R10.32 LLQ ABDOMINAL PAIN: ICD-10-CM

## 2024-03-20 DIAGNOSIS — K57.92 DIVERTICULITIS: Primary | ICD-10-CM

## 2024-03-20 DIAGNOSIS — R10.32 LLQ ABDOMINAL PAIN: Primary | ICD-10-CM

## 2024-03-20 LAB
ALBUMIN SERPL-MCNC: 4.1 G/DL (ref 3.5–5.2)
ALBUMIN/GLOB SERPL: 1 {RATIO} (ref 1–2.5)
ALP SERPL-CCNC: 113 U/L (ref 35–104)
ALT SERPL-CCNC: 28 U/L (ref 10–35)
ANION GAP SERPL CALCULATED.3IONS-SCNC: 13 MMOL/L (ref 9–16)
AST SERPL-CCNC: 25 U/L (ref 10–35)
BACTERIA URNS QL MICRO: NORMAL
BASOPHILS # BLD: 0.05 K/UL (ref 0–0.2)
BASOPHILS NFR BLD: 1 % (ref 0–2)
BILIRUB SERPL-MCNC: 0.9 MG/DL (ref 0–1.2)
BILIRUB UR QL STRIP: NEGATIVE
BUN SERPL-MCNC: 14 MG/DL (ref 8–23)
CALCIUM SERPL-MCNC: 9.4 MG/DL (ref 8.6–10.4)
CASTS #/AREA URNS LPF: NORMAL /LPF (ref 0–8)
CHLORIDE SERPL-SCNC: 103 MMOL/L (ref 98–107)
CLARITY UR: ABNORMAL
CO2 SERPL-SCNC: 24 MMOL/L (ref 20–31)
COLOR UR: ABNORMAL
CREAT SERPL-MCNC: 0.8 MG/DL (ref 0.5–0.9)
EOSINOPHIL # BLD: 0.13 K/UL (ref 0–0.44)
EOSINOPHILS RELATIVE PERCENT: 2 % (ref 1–4)
EPI CELLS #/AREA URNS HPF: NORMAL /HPF (ref 0–5)
ERYTHROCYTE [DISTWIDTH] IN BLOOD BY AUTOMATED COUNT: 14.3 % (ref 11.8–14.4)
GFR SERPL CREATININE-BSD FRML MDRD: >60 ML/MIN/1.73M2
GLUCOSE SERPL-MCNC: 108 MG/DL (ref 74–99)
GLUCOSE UR STRIP-MCNC: NEGATIVE MG/DL
HCT VFR BLD AUTO: 38.6 % (ref 36.3–47.1)
HGB BLD-MCNC: 12.8 G/DL (ref 11.9–15.1)
HGB UR QL STRIP.AUTO: NEGATIVE
IMM GRANULOCYTES # BLD AUTO: 0.03 K/UL (ref 0–0.3)
IMM GRANULOCYTES NFR BLD: 1 %
KETONES UR STRIP-MCNC: ABNORMAL MG/DL
LEUKOCYTE ESTERASE UR QL STRIP: ABNORMAL
LYMPHOCYTES NFR BLD: 1.68 K/UL (ref 1.1–3.7)
LYMPHOCYTES RELATIVE PERCENT: 26 % (ref 24–43)
MCH RBC QN AUTO: 28.5 PG (ref 25.2–33.5)
MCHC RBC AUTO-ENTMCNC: 33.2 G/DL (ref 28.4–34.8)
MCV RBC AUTO: 86 FL (ref 82.6–102.9)
MONOCYTES NFR BLD: 0.64 K/UL (ref 0.1–1.2)
MONOCYTES NFR BLD: 10 % (ref 3–12)
NEUTROPHILS NFR BLD: 60 % (ref 36–65)
NEUTS SEG NFR BLD: 3.83 K/UL (ref 1.5–8.1)
NITRITE UR QL STRIP: NEGATIVE
NRBC BLD-RTO: 0 PER 100 WBC
PH UR STRIP: 5.5 [PH] (ref 5–8)
PLATELET # BLD AUTO: 276 K/UL (ref 138–453)
PMV BLD AUTO: 11.5 FL (ref 8.1–13.5)
POTASSIUM SERPL-SCNC: 4 MMOL/L (ref 3.7–5.3)
PROT SERPL-MCNC: 7.2 G/DL (ref 6.6–8.7)
PROT UR STRIP-MCNC: NEGATIVE MG/DL
RBC # BLD AUTO: 4.49 M/UL (ref 3.95–5.11)
RBC #/AREA URNS HPF: NORMAL /HPF (ref 0–4)
SODIUM SERPL-SCNC: 140 MMOL/L (ref 136–145)
SP GR UR STRIP: 1.02 (ref 1–1.03)
UROBILINOGEN UR STRIP-ACNC: NORMAL EU/DL (ref 0–1)
WBC #/AREA URNS HPF: NORMAL /HPF (ref 0–5)
WBC OTHER # BLD: 6.4 K/UL (ref 3.5–11.3)

## 2024-03-20 PROCEDURE — 74177 CT ABD & PELVIS W/CONTRAST: CPT

## 2024-03-20 PROCEDURE — 36415 COLL VENOUS BLD VENIPUNCTURE: CPT

## 2024-03-20 PROCEDURE — 2580000003 HC RX 258: Performed by: NURSE PRACTITIONER

## 2024-03-20 PROCEDURE — 81001 URINALYSIS AUTO W/SCOPE: CPT

## 2024-03-20 PROCEDURE — 80053 COMPREHEN METABOLIC PANEL: CPT

## 2024-03-20 PROCEDURE — 87086 URINE CULTURE/COLONY COUNT: CPT

## 2024-03-20 PROCEDURE — 6360000004 HC RX CONTRAST MEDICATION: Performed by: NURSE PRACTITIONER

## 2024-03-20 PROCEDURE — 85025 COMPLETE CBC W/AUTO DIFF WBC: CPT

## 2024-03-20 RX ORDER — CIPROFLOXACIN 500 MG/1
500 TABLET, FILM COATED ORAL 2 TIMES DAILY
Qty: 14 TABLET | Refills: 0 | Status: SHIPPED | OUTPATIENT
Start: 2024-03-20 | End: 2024-03-27

## 2024-03-20 RX ORDER — METRONIDAZOLE 500 MG/1
500 TABLET ORAL 3 TIMES DAILY
Qty: 21 TABLET | Refills: 0 | Status: SHIPPED | OUTPATIENT
Start: 2024-03-20 | End: 2024-03-27

## 2024-03-20 RX ORDER — 0.9 % SODIUM CHLORIDE 0.9 %
70 INTRAVENOUS SOLUTION INTRAVENOUS ONCE
Status: COMPLETED | OUTPATIENT
Start: 2024-03-20 | End: 2024-03-20

## 2024-03-20 RX ORDER — SODIUM CHLORIDE 0.9 % (FLUSH) 0.9 %
10 SYRINGE (ML) INJECTION PRN
Status: DISCONTINUED | OUTPATIENT
Start: 2024-03-20 | End: 2024-03-23 | Stop reason: HOSPADM

## 2024-03-20 RX ADMIN — SODIUM CHLORIDE 70 ML: 9 INJECTION, SOLUTION INTRAVENOUS at 15:52

## 2024-03-20 RX ADMIN — SODIUM CHLORIDE, PRESERVATIVE FREE 10 ML: 5 INJECTION INTRAVENOUS at 15:53

## 2024-03-20 RX ADMIN — IOPAMIDOL 75 ML: 755 INJECTION, SOLUTION INTRAVENOUS at 15:54

## 2024-03-20 ASSESSMENT — PATIENT HEALTH QUESTIONNAIRE - PHQ9
SUM OF ALL RESPONSES TO PHQ QUESTIONS 1-9: 0
SUM OF ALL RESPONSES TO PHQ9 QUESTIONS 1 & 2: 0
2. FEELING DOWN, DEPRESSED OR HOPELESS: NOT AT ALL
1. LITTLE INTEREST OR PLEASURE IN DOING THINGS: NOT AT ALL
SUM OF ALL RESPONSES TO PHQ QUESTIONS 1-9: 0

## 2024-03-20 NOTE — PROGRESS NOTES
Suzie Duckworth, [facility]  [address]  [place]  [address]  [phone]  [phone]    Nerissa Cosme is a 68 y.o. female who is here with c/o of:    Chief Complaint: Abdominal Pain (Pt c/o LLQ pain x 3 days after over in a different position and felt a pain per pt)      Patient Accompanied by: n/a    HPI - Nerissa JABIER Cosme is here today with c/o:    Patient here with complaints of LLQ pain that started Monday. Says it has gotten a little worse since then. Describes pain as being dull. Does get sharp at times. Denies nausea, vomiting, diarrhea, constipation, appetite changes. Denies fevers or chills.       Health Maintenance Due   Topic Date Due    Hepatitis B vaccine (2 of 3 - 19+ 3-dose series) 10/29/2018        Patient Active Problem List:     Asthma     Obesity     OA (osteoarthritis)     Hyperlipidemia     BPPV (benign paroxysmal positional vertigo)     GERD (gastroesophageal reflux disease)     Essential hypertension     Anaphylaxis     Other specified glaucoma     Adjustment disorder with anxiety     Other allergy status, other than to drugs and biological substances     Past Medical History:   Diagnosis Date    Asthma     Hypertension     OA (osteoarthritis)     Obesity       Past Surgical History:   Procedure Laterality Date     SECTION      x2    CHOLECYSTECTOMY      COLONOSCOPY      ; ; due     EAR SURGERY Left      Family History   Problem Relation Age of Onset    Asthma Mother     Liver Cancer Father     Hypertension Father      Social History     Tobacco Use    Smoking status: Never    Smokeless tobacco: Never   Substance Use Topics    Alcohol use: Yes     Comment: rare      ALLERGIES:    Allergies   Allergen Reactions    Cat Hair Extract     Daypro [Oxaprozin]     Dog Epithelium (Canis Lupus Familiaris)     Dust Mite Extract     Shellfish-Derived Products     Tetracyclines & Related     Erythromycin Rash          Subjective   Review of Systems

## 2024-03-21 DIAGNOSIS — R82.90 ABNORMAL URINE: Primary | ICD-10-CM

## 2024-03-21 ASSESSMENT — ENCOUNTER SYMPTOMS
VOMITING: 0
NAUSEA: 0
CONSTIPATION: 0
ABDOMINAL PAIN: 1
DIARRHEA: 0

## 2024-03-22 LAB
MICROORGANISM SPEC CULT: NORMAL
SPECIMEN DESCRIPTION: NORMAL

## 2024-03-26 ENCOUNTER — OFFICE VISIT (OUTPATIENT)
Dept: BEHAVIORAL/MENTAL HEALTH CLINIC | Age: 69
End: 2024-03-26
Payer: MEDICARE

## 2024-03-26 DIAGNOSIS — F43.23 ADJUSTMENT DISORDER WITH MIXED ANXIETY AND DEPRESSED MOOD: Primary | ICD-10-CM

## 2024-03-26 PROCEDURE — 1123F ACP DISCUSS/DSCN MKR DOCD: CPT | Performed by: PSYCHOLOGIST

## 2024-03-26 PROCEDURE — 90837 PSYTX W PT 60 MINUTES: CPT | Performed by: PSYCHOLOGIST

## 2024-03-26 PROCEDURE — 1036F TOBACCO NON-USER: CPT | Performed by: PSYCHOLOGIST

## 2024-03-26 ASSESSMENT — PATIENT HEALTH QUESTIONNAIRE - PHQ9
SUM OF ALL RESPONSES TO PHQ9 QUESTIONS 1 & 2: 2
9. THOUGHTS THAT YOU WOULD BE BETTER OFF DEAD, OR OF HURTING YOURSELF: NOT AT ALL
SUM OF ALL RESPONSES TO PHQ QUESTIONS 1-9: 4
SUM OF ALL RESPONSES TO PHQ QUESTIONS 1-9: 4
5. POOR APPETITE OR OVEREATING: SEVERAL DAYS
2. FEELING DOWN, DEPRESSED OR HOPELESS: SEVERAL DAYS
3. TROUBLE FALLING OR STAYING ASLEEP: NOT AT ALL
6. FEELING BAD ABOUT YOURSELF - OR THAT YOU ARE A FAILURE OR HAVE LET YOURSELF OR YOUR FAMILY DOWN: NOT AT ALL
4. FEELING TIRED OR HAVING LITTLE ENERGY: SEVERAL DAYS
1. LITTLE INTEREST OR PLEASURE IN DOING THINGS: SEVERAL DAYS
7. TROUBLE CONCENTRATING ON THINGS, SUCH AS READING THE NEWSPAPER OR WATCHING TELEVISION: NOT AT ALL
SUM OF ALL RESPONSES TO PHQ QUESTIONS 1-9: 4
SUM OF ALL RESPONSES TO PHQ QUESTIONS 1-9: 4
10. IF YOU CHECKED OFF ANY PROBLEMS, HOW DIFFICULT HAVE THESE PROBLEMS MADE IT FOR YOU TO DO YOUR WORK, TAKE CARE OF THINGS AT HOME, OR GET ALONG WITH OTHER PEOPLE: SOMEWHAT DIFFICULT
8. MOVING OR SPEAKING SO SLOWLY THAT OTHER PEOPLE COULD HAVE NOTICED. OR THE OPPOSITE, BEING SO FIGETY OR RESTLESS THAT YOU HAVE BEEN MOVING AROUND A LOT MORE THAN USUAL: NOT AT ALL

## 2024-03-26 NOTE — PROGRESS NOTES
health.  She reported that she has been \"going over the top\" in restricting her diet, overly tracking and scrutinizing everything she eats.  She stated that she has been feeling more irritable because she feels hungry all of the time.  She stated that the way she has been restricting herself has also made her feel like she is \"in a [facility]\" and that she can no longer be a \"normal person\" and enjoy food or other fun things.  She reported that not only has she been feeling more anxious and distressed, but she has been feeling more depressed because of how restricted she has been living her life.  She stated that her PCP did get her scheduled with a dietician, but they could not schedule her until 4/16/24.  Nerissa reported that the stress with her  has continued and he has not been very emotionally supportive throughout this time.  However, she noted that he has at least \"stepped up\" somewhat more to help with grocery shopping and around the house because she has been less able due to her health.  Nerissa also reported that her son had a son, who is now 5 months old.  She reported that she was helping with her new grandson in addition to her 2yo grandson (through her daughter), but after she was discharged from the hospital last month, she told both her daughter and son that she needed to stop watching the grandchildren to decrease her stress level.  She reported that doing this has decreased her stress, but it was also \"heartbreaking\" for her because she greatly enjoyed taking care of her grandchildren.  Nerissa reported that her daughter is also currently pregnant with her second child (baby girl) and at 7 months.  However, the baby and her daughter have been having problems.  She stated that her daughter was recently discharged from an 8-day stay at the hospital and she had to watch her 2yo grandson while her daughter was in the hospital.  She reported that this has added to her stress level.  She stated that last

## 2024-03-27 ENCOUNTER — TELEPHONE (OUTPATIENT)
Dept: FAMILY MEDICINE CLINIC | Age: 69
End: 2024-03-27

## 2024-03-27 NOTE — TELEPHONE ENCOUNTER
Patient forgot to take her Metoprolol 25mg last night. She woke up this morning at 10am and bp was 165/59 pulse 94. She took her Lisinopril 5mg. She took bp again a 12:05 and it was 143/73 pulse was 99. Patient is having palpations and a dry cough but no chest pain, no SOB, no chest pressure or tightness. She is asking what to do. Please advise

## 2024-04-01 ENCOUNTER — OFFICE VISIT (OUTPATIENT)
Dept: FAMILY MEDICINE CLINIC | Age: 69
End: 2024-04-01
Payer: MEDICARE

## 2024-04-01 VITALS
HEART RATE: 93 BPM | BODY MASS INDEX: 40.35 KG/M2 | OXYGEN SATURATION: 97 % | DIASTOLIC BLOOD PRESSURE: 64 MMHG | SYSTOLIC BLOOD PRESSURE: 116 MMHG | TEMPERATURE: 96.9 F | WEIGHT: 203.2 LBS

## 2024-04-01 DIAGNOSIS — Z09 FOLLOW-UP EXAM AFTER TREATMENT: Primary | ICD-10-CM

## 2024-04-01 DIAGNOSIS — K57.92 DIVERTICULITIS: ICD-10-CM

## 2024-04-01 PROCEDURE — 1090F PRES/ABSN URINE INCON ASSESS: CPT | Performed by: NURSE PRACTITIONER

## 2024-04-01 PROCEDURE — 1036F TOBACCO NON-USER: CPT | Performed by: NURSE PRACTITIONER

## 2024-04-01 PROCEDURE — 3017F COLORECTAL CA SCREEN DOC REV: CPT | Performed by: NURSE PRACTITIONER

## 2024-04-01 PROCEDURE — 1123F ACP DISCUSS/DSCN MKR DOCD: CPT | Performed by: NURSE PRACTITIONER

## 2024-04-01 PROCEDURE — G8399 PT W/DXA RESULTS DOCUMENT: HCPCS | Performed by: NURSE PRACTITIONER

## 2024-04-01 PROCEDURE — 3074F SYST BP LT 130 MM HG: CPT | Performed by: NURSE PRACTITIONER

## 2024-04-01 PROCEDURE — 3078F DIAST BP <80 MM HG: CPT | Performed by: NURSE PRACTITIONER

## 2024-04-01 PROCEDURE — G8417 CALC BMI ABV UP PARAM F/U: HCPCS | Performed by: NURSE PRACTITIONER

## 2024-04-01 PROCEDURE — 99214 OFFICE O/P EST MOD 30 MIN: CPT | Performed by: NURSE PRACTITIONER

## 2024-04-01 PROCEDURE — G8427 DOCREV CUR MEDS BY ELIG CLIN: HCPCS | Performed by: NURSE PRACTITIONER

## 2024-04-01 ASSESSMENT — ENCOUNTER SYMPTOMS: ABDOMINAL PAIN: 1

## 2024-04-01 NOTE — PROGRESS NOTES
WBC Final    Neutrophils % 03/20/2024 60  36 - 65 % Final    Lymphocytes % 03/20/2024 26  24 - 43 % Final    Monocytes % 03/20/2024 10  3 - 12 % Final    Eosinophils % 03/20/2024 2  1 - 4 % Final    Basophils % 03/20/2024 1  0 - 2 % Final    Immature Granulocytes 03/20/2024 1 (H)  0 % Final    Neutrophils Absolute 03/20/2024 3.83  1.50 - 8.10 k/uL Final    Lymphocytes Absolute 03/20/2024 1.68  1.10 - 3.70 k/uL Final    Monocytes Absolute 03/20/2024 0.64  0.10 - 1.20 k/uL Final    Eosinophils Absolute 03/20/2024 0.13  0.00 - 0.44 k/uL Final    Basophils Absolute 03/20/2024 0.05  0.00 - 0.20 k/uL Final    Absolute Immature Granulocyte 03/20/2024 0.03  0.00 - 0.30 k/uL Final    Color, UA 03/20/2024 Dark Yellow (A)  Yellow Final    Turbidity UA 03/20/2024 Turbid (A)  Clear Final    Glucose, Ur 03/20/2024 NEGATIVE  NEGATIVE mg/dL Final    Bilirubin Urine 03/20/2024 NEGATIVE  NEGATIVE Final    Ketones, Urine 03/20/2024 TRACE (A)  NEGATIVE mg/dL Final    Specific Gravity, UA 03/20/2024 1.021  1.005 - 1.030 Final    Urine Hgb 03/20/2024 NEGATIVE  NEGATIVE Final    pH, UA 03/20/2024 5.5  5.0 - 8.0 Final    Protein, UA 03/20/2024 NEGATIVE  NEGATIVE mg/dL Final    Urobilinogen, Urine 03/20/2024 Normal  0.0 - 1.0 EU/dL Final    Nitrite, Urine 03/20/2024 NEGATIVE  NEGATIVE Final    Leukocyte Esterase, Urine 03/20/2024 TRACE (A)  NEGATIVE Final    WBC, UA 03/20/2024 2 TO 5  0 - 5 /HPF Final    RBC, UA 03/20/2024 2 TO 5  0 - 4 /HPF Final    Reference range defined for non-centrifuged specimen.    Casts UA 03/20/2024 2 TO 5 HYALINE Reference range defined for non-centrifuged specimen.  0 - 8 /LPF Final    Epithelial Cells UA 03/20/2024 2 TO 5  0 - 5 /HPF Final    Bacteria, UA 03/20/2024 None  None Final    Specimen Description 03/20/2024 .URINE   Final    Culture 03/20/2024 NO SIGNIFICANT GROWTH   Final     No results found for this visit on 04/01/24.    Completed Orders/Prescriptions   No orders of the defined types were placed in

## 2024-04-15 ENCOUNTER — HOSPITAL ENCOUNTER (OUTPATIENT)
Dept: NUTRITION | Age: 69
Setting detail: THERAPIES SERIES
Discharge: HOME | End: 2024-04-15
Payer: MEDICARE

## 2024-04-15 VITALS — BODY MASS INDEX: 40.35 KG/M2 | HEIGHT: 60 IN

## 2024-04-15 PROCEDURE — 97802 MEDICAL NUTRITION INDIV IN: CPT

## 2024-04-15 NOTE — PROGRESS NOTES
Comprehensive Nutrition Assessment    Type and Reason for Visit:  Patient Education    Nutrition Recommendations/Plan:   Provided low sodium (2000 mg of sodium) and diverticulosis diet  Handouts provided: Fiber content of foods; high-fiber nutrition therapy; low-fiber nutrition therapy (during diverticulitis); low-sodium nutrition therapy; sodium- free flavoring tips; hypertension nutrition therapy  Goals: Follow 0938-0639 mg sodium diet; diverticulosis diet (high-fiber diet 25-30 gm per day). Make an effort to customize restaurant items to broaden options and make a more normalized lifestyle      Malnutrition Assessment:  Malnutrition Status:  No malnutrition (04/15/24 1535)        Nutrition Assessment:    Patient came to nutrition counseling seeking information about low sodium diet and diverticulosis. Patient reports she had fluid on her lungs and was in the hospital. Patient states that after that hospital stay in February 2024 she has been very strick with her diet and feeling like she needs help. Patient given diet education on low sodium, sodium flavoring tips, hypertension, high fiber diet, fiber content in foods and ways to customize food orders at restaurants. Patient very grateful for information and handouts.    Nutrition Related Findings:      Wound Type: None       Current Nutrition Intake & Therapies:    Average Meal Intake: 51-75%, %  Average Supplements Intake: None Ordered  No diet orders on file    Anthropometric Measures:  Height: 151.1 cm (4' 11.5\")  Ideal Body Weight (IBW): 98 lbs (45 kg)       Current Body Weight: 92.1 kg (203 lb), 207.1 % IBW.    Current BMI (kg/m2): 40.3                          BMI Categories: Obese Class 3 (BMI 40.0 or greater)    Estimated Daily Nutrient Needs:  Energy Requirements Based On: Kcal/kg  Weight Used for Energy Requirements: Current  Energy (kcal/day): 1559-6820 kcal (12-15 kcal/kg)  Weight Used for Protein Requirements: Ideal  Protein (g/day): 59-63 gm

## 2024-04-16 ENCOUNTER — TELEPHONE (OUTPATIENT)
Dept: BEHAVIORAL/MENTAL HEALTH CLINIC | Age: 69
End: 2024-04-16

## 2024-04-16 NOTE — TELEPHONE ENCOUNTER
[facility] reached out to patient about her scheduled appointment today at 10AM.  She reported that her daughter gave birth early last week and the baby has been in the NICU.  She stated that she has been driving her daughter to the hospital and other appointments.  She reported that her daughter then started having problems and was re-admitted to the hospital for preeclampsia recently.  She stated that she is with her daughter at the hospital today, which is why she was not able to keep her appointment with the [facility].  She reported that with everything going on, she would like to hold off on rescheduling her therapy appointment with the [facility].  She stated that she will contact the [facility] when she is ready and available to schedule another appointment.  [facility] provided some emotional support and validation.

## 2024-07-24 ENCOUNTER — OFFICE VISIT (OUTPATIENT)
Dept: FAMILY MEDICINE CLINIC | Age: 69
End: 2024-07-24
Payer: MEDICARE

## 2024-07-24 VITALS
SYSTOLIC BLOOD PRESSURE: 120 MMHG | OXYGEN SATURATION: 96 % | TEMPERATURE: 97.5 F | DIASTOLIC BLOOD PRESSURE: 68 MMHG | HEART RATE: 68 BPM | WEIGHT: 206 LBS | BODY MASS INDEX: 40.91 KG/M2

## 2024-07-24 DIAGNOSIS — Z12.31 SCREENING MAMMOGRAM FOR BREAST CANCER: ICD-10-CM

## 2024-07-24 DIAGNOSIS — I10 ESSENTIAL HYPERTENSION: ICD-10-CM

## 2024-07-24 DIAGNOSIS — M54.2 NECK PAIN: ICD-10-CM

## 2024-07-24 DIAGNOSIS — Z00.00 MEDICARE ANNUAL WELLNESS VISIT, SUBSEQUENT: Primary | ICD-10-CM

## 2024-07-24 PROCEDURE — 3078F DIAST BP <80 MM HG: CPT | Performed by: NURSE PRACTITIONER

## 2024-07-24 PROCEDURE — 1123F ACP DISCUSS/DSCN MKR DOCD: CPT | Performed by: NURSE PRACTITIONER

## 2024-07-24 PROCEDURE — 3074F SYST BP LT 130 MM HG: CPT | Performed by: NURSE PRACTITIONER

## 2024-07-24 PROCEDURE — G8399 PT W/DXA RESULTS DOCUMENT: HCPCS | Performed by: NURSE PRACTITIONER

## 2024-07-24 PROCEDURE — G8417 CALC BMI ABV UP PARAM F/U: HCPCS | Performed by: NURSE PRACTITIONER

## 2024-07-24 PROCEDURE — G8427 DOCREV CUR MEDS BY ELIG CLIN: HCPCS | Performed by: NURSE PRACTITIONER

## 2024-07-24 PROCEDURE — 3017F COLORECTAL CA SCREEN DOC REV: CPT | Performed by: NURSE PRACTITIONER

## 2024-07-24 PROCEDURE — 1036F TOBACCO NON-USER: CPT | Performed by: NURSE PRACTITIONER

## 2024-07-24 PROCEDURE — 99213 OFFICE O/P EST LOW 20 MIN: CPT | Performed by: NURSE PRACTITIONER

## 2024-07-24 PROCEDURE — G0439 PPPS, SUBSEQ VISIT: HCPCS | Performed by: NURSE PRACTITIONER

## 2024-07-24 PROCEDURE — 1090F PRES/ABSN URINE INCON ASSESS: CPT | Performed by: NURSE PRACTITIONER

## 2024-07-24 NOTE — PROGRESS NOTES
Medicare Annual Wellness Visit    Nerissa Cosme is here for Medicare AWV    Assessment & Plan   Medicare annual wellness visit, subsequent  Screening mammogram for breast cancer  -     [facility] DIGITAL SCREEN W OR WO CAD BILATERAL; Future  Essential hypertension  -     CBC with Auto Differential; Future  -     Comprehensive Metabolic Panel; Future  -     TSH with Reflex; Future  -     Lipid, Fasting; Future  -     Magnesium; Future  Neck pain  -     [facility] [place]  -     [place] OFFICE/OUTPATIENT ESTABLISHED LOW MDM 20-29 MIN  Recommendations for Preventive Services Due: see orders and patient instructions/AVS.  Recommended screening schedule for the next 5-10 years is provided to the patient in written form: see Patient Instructions/AVS.     Return in about 6 months (around 1/24/2025) for chronic conditions.     Subjective   The following acute and/or chronic problems were also addressed today:    Has been having some neck soreness and stiffness for the last month. Has full range of motion but says when she moves a certain way she will get the pain. Does have some radiation up into the back of her head.     Patient's complete Health Risk Assessment and screening values have been reviewed and are found in Flowsheets. The following problems were reviewed today and where indicated follow up appointments were made and/or referrals ordered.    Positive Risk Factor Screenings with Interventions:                Inactivity:  On average, how many days per week do you engage in moderate to strenuous exercise (like a brisk walk)?: 1 day (!) Abnormal  On average, how many minutes do you engage in exercise at this level?: 10 min  Interventions:  Patient declined any further interventions or treatment     Abnormal BMI (obese):  Body mass index is 40.91 kg/m². (!) Abnormal  Interventions:  Patient declines any further evaluation or treatment           Safety:  Do you have any tripping hazards - loose or unsecured

## 2024-08-05 ENCOUNTER — TELEPHONE (OUTPATIENT)
Dept: FAMILY MEDICINE CLINIC | Age: 69
End: 2024-08-05

## 2024-08-05 NOTE — TELEPHONE ENCOUNTER
Patient calling to ask if she can get her PT through [facility] [address]  Fax. [phone]

## 2024-08-06 DIAGNOSIS — M54.2 NECK PAIN: Primary | ICD-10-CM

## 2024-08-17 LAB
ALBUMIN: 4 G/DL
ALP BLD-CCNC: 104 U/L
ALT SERPL-CCNC: 19 U/L
ANION GAP SERPL CALCULATED.3IONS-SCNC: NORMAL MMOL/L
AST SERPL-CCNC: 20 U/L
BASOPHILS ABSOLUTE: 0.1 /ΜL
BASOPHILS RELATIVE PERCENT: 0.9 %
BILIRUB SERPL-MCNC: 0.9 MG/DL (ref 0.1–1.4)
BUN BLDV-MCNC: 12 MG/DL
CALCIUM SERPL-MCNC: 9.8 MG/DL
CHLORIDE BLD-SCNC: 101 MMOL/L
CHOLESTEROL, FASTING: 200
CO2: 28 MMOL/L
CREAT SERPL-MCNC: 0.71 MG/DL
EOSINOPHILS ABSOLUTE: 0.2 /ΜL
EOSINOPHILS RELATIVE PERCENT: 3 %
GFR, ESTIMATED: >90
GLUCOSE BLD-MCNC: 88 MG/DL
HCT VFR BLD CALC: 38.9 % (ref 36–46)
HDLC SERPL-MCNC: 58 MG/DL (ref 35–70)
HEMOGLOBIN: 13.1 G/DL (ref 12–16)
LDL CHOLESTEROL: 102
LYMPHOCYTES ABSOLUTE: 2 /ΜL
LYMPHOCYTES RELATIVE PERCENT: 32.1 %
MAGNESIUM: 1.9 MG/DL
MCH RBC QN AUTO: 28.7 PG
MCHC RBC AUTO-ENTMCNC: 33.8 G/DL
MCV RBC AUTO: 85 FL
MONOCYTES ABSOLUTE: 0.5 /ΜL
MONOCYTES RELATIVE PERCENT: 8.3 %
NEUTROPHILS ABSOLUTE: 3.4 /ΜL
NEUTROPHILS RELATIVE PERCENT: 55.7 %
PDW BLD-RTO: 13.5 %
PLATELET # BLD: 250 K/ΜL
PMV BLD AUTO: 9.1 FL
POTASSIUM SERPL-SCNC: 4.1 MMOL/L
RBC # BLD: 4.58 10^6/ΜL
SODIUM BLD-SCNC: 140 MMOL/L
TOTAL PROTEIN: 7.4 G/DL (ref 6.4–8.2)
TRIGLYCERIDE, FASTING: 199
TSH SERPL DL<=0.05 MIU/L-ACNC: 2.01 UIU/ML
WBC # BLD: 6.2 10^3/ML

## 2024-08-23 DIAGNOSIS — I10 ESSENTIAL HYPERTENSION: ICD-10-CM

## 2024-08-31 DIAGNOSIS — Z76.0 MEDICATION REFILL: ICD-10-CM

## 2024-09-03 RX ORDER — POTASSIUM CHLORIDE 750 MG/1
10 CAPSULE, EXTENDED RELEASE ORAL 2 TIMES DAILY
Qty: 180 CAPSULE | Refills: 1 | Status: SHIPPED | OUTPATIENT
Start: 2024-09-03

## 2024-09-03 RX ORDER — LISINOPRIL 5 MG/1
5 TABLET ORAL DAILY
Qty: 90 TABLET | Refills: 1 | Status: SHIPPED | OUTPATIENT
Start: 2024-09-03

## 2024-09-03 RX ORDER — HYDROCHLOROTHIAZIDE 50 MG/1
50 TABLET ORAL DAILY
Qty: 90 TABLET | Refills: 1 | Status: SHIPPED | OUTPATIENT
Start: 2024-09-03

## 2024-09-03 RX ORDER — PANTOPRAZOLE SODIUM 40 MG/1
TABLET, DELAYED RELEASE ORAL
Qty: 90 TABLET | Refills: 1 | Status: SHIPPED | OUTPATIENT
Start: 2024-09-03

## 2024-09-03 NOTE — TELEPHONE ENCOUNTER
Nerissa Cosme is calling to request a refill on the following medication(s):    Last Visit Date (If Applicable):  2/12/2024    Next Visit Date:    Visit date not found    Medication Request:  Requested Prescriptions     Pending Prescriptions Disp Refills    potassium chloride (MICRO-K) 10 MEQ extended release capsule [Pharmacy Med Name: POT CHLORIDE CAP 10MEQ ER] 180 capsule 1     Sig: TAKE 1 CAPSULE TWICE DAILY    pantoprazole (PROTONIX) 40 MG tablet [Pharmacy Med Name: PANTOPRAZOLE TAB 40MG DR] 90 tablet 1     Sig: TAKE 1 TABLET EVERY MORNINGBEFORE BREAKFAST    hydroCHLOROthiazide (HYDRODIURIL) 50 MG tablet [Pharmacy Med Name: HYDROCHLOROT TAB 50MG] 90 tablet 1     Sig: TAKE 1 TABLET DAILY    lisinopril (PRINIVIL;ZESTRIL) 5 MG tablet [Pharmacy Med Name: LISINOPRIL TAB 5MG] 90 tablet 1     Sig: TAKE 1 TABLET DAILY

## 2024-09-25 ENCOUNTER — OFFICE VISIT (OUTPATIENT)
Dept: FAMILY MEDICINE CLINIC | Age: 69
End: 2024-09-25

## 2024-09-25 VITALS
SYSTOLIC BLOOD PRESSURE: 120 MMHG | TEMPERATURE: 97.6 F | DIASTOLIC BLOOD PRESSURE: 68 MMHG | OXYGEN SATURATION: 98 % | WEIGHT: 203 LBS | BODY MASS INDEX: 40.31 KG/M2 | HEART RATE: 82 BPM

## 2024-09-25 DIAGNOSIS — E78.5 HYPERLIPIDEMIA, UNSPECIFIED HYPERLIPIDEMIA TYPE: ICD-10-CM

## 2024-09-25 DIAGNOSIS — J02.9 SORE THROAT: Primary | ICD-10-CM

## 2024-09-25 DIAGNOSIS — J06.9 VIRAL URI: ICD-10-CM

## 2024-09-25 LAB
INFLUENZA A ANTIBODY: NEGATIVE
INFLUENZA B ANTIBODY: NEGATIVE
Lab: NORMAL
QC PASS/FAIL: NORMAL
SARS-COV-2 RDRP RESP QL NAA+PROBE: NEGATIVE

## 2024-09-25 RX ORDER — LORATADINE 10 MG/1
10 TABLET ORAL DAILY
Qty: 30 TABLET | Refills: 0 | Status: SHIPPED | OUTPATIENT
Start: 2024-09-25 | End: 2024-10-25

## 2024-09-25 RX ORDER — FUROSEMIDE 20 MG
20 TABLET ORAL DAILY
COMMUNITY
Start: 2024-02-16 | End: 2024-09-25

## 2024-09-25 RX ORDER — FLUTICASONE FUROATE AND VILANTEROL TRIFENATATE 200; 25 UG/1; UG/1
POWDER RESPIRATORY (INHALATION)
COMMUNITY
Start: 2024-08-31

## 2024-09-25 RX ORDER — FLUTICASONE PROPIONATE 50 MCG
1 SPRAY, SUSPENSION (ML) NASAL DAILY
Qty: 16 G | Refills: 0 | Status: SHIPPED | OUTPATIENT
Start: 2024-09-25

## 2024-09-25 ASSESSMENT — PATIENT HEALTH QUESTIONNAIRE - PHQ9
SUM OF ALL RESPONSES TO PHQ QUESTIONS 1-9: 0
SUM OF ALL RESPONSES TO PHQ QUESTIONS 1-9: 0
1. LITTLE INTEREST OR PLEASURE IN DOING THINGS: NOT AT ALL
SUM OF ALL RESPONSES TO PHQ QUESTIONS 1-9: 0
SUM OF ALL RESPONSES TO PHQ QUESTIONS 1-9: 0
2. FEELING DOWN, DEPRESSED OR HOPELESS: NOT AT ALL
SUM OF ALL RESPONSES TO PHQ9 QUESTIONS 1 & 2: 0

## 2024-09-25 ASSESSMENT — ENCOUNTER SYMPTOMS
COUGH: 1
SHORTNESS OF BREATH: 0
SINUS PRESSURE: 1
SORE THROAT: 1
WHEEZING: 0

## 2024-11-20 ENCOUNTER — NURSE ONLY (OUTPATIENT)
Dept: FAMILY MEDICINE CLINIC | Age: 69
End: 2024-11-20

## 2024-12-09 DIAGNOSIS — I10 ESSENTIAL HYPERTENSION: ICD-10-CM

## 2024-12-09 DIAGNOSIS — R06.09 DYSPNEA ON EXERTION: ICD-10-CM

## 2024-12-09 DIAGNOSIS — R00.0 TACHYCARDIA: ICD-10-CM

## 2024-12-10 RX ORDER — METOPROLOL SUCCINATE 25 MG/1
25 TABLET, EXTENDED RELEASE ORAL DAILY
Qty: 90 TABLET | Refills: 0 | Status: SHIPPED | OUTPATIENT
Start: 2024-12-10

## 2024-12-10 NOTE — TELEPHONE ENCOUNTER
Nerissa Cosme is calling to request a refill on the following medication(s):    Medication Request:  Requested Prescriptions     Pending Prescriptions Disp Refills    metoprolol succinate (TOPROL XL) 25 MG extended release tablet [Pharmacy Med Name: Metoprolol Succinate ER Oral Tablet Extended Release 24 Hour 25 MG] 90 tablet 0     Sig: TAKE ONE TABLET BY MOUTH ONCE A DAY       Last Visit Date (If Applicable):  9/25/2024    Next Visit Date:    12/11/2024

## 2024-12-11 ENCOUNTER — OFFICE VISIT (OUTPATIENT)
Dept: FAMILY MEDICINE CLINIC | Age: 69
End: 2024-12-11
Payer: MEDICARE

## 2024-12-11 VITALS
WEIGHT: 210 LBS | HEART RATE: 73 BPM | DIASTOLIC BLOOD PRESSURE: 60 MMHG | BODY MASS INDEX: 41.71 KG/M2 | OXYGEN SATURATION: 98 % | SYSTOLIC BLOOD PRESSURE: 116 MMHG | TEMPERATURE: 97.2 F

## 2024-12-11 DIAGNOSIS — M54.2 CHRONIC NECK PAIN: Primary | ICD-10-CM

## 2024-12-11 DIAGNOSIS — G89.29 CHRONIC NECK PAIN: Primary | ICD-10-CM

## 2024-12-11 DIAGNOSIS — Z78.9 FAILURE OF OUTPATIENT TREATMENT: ICD-10-CM

## 2024-12-11 PROCEDURE — 1123F ACP DISCUSS/DSCN MKR DOCD: CPT | Performed by: NURSE PRACTITIONER

## 2024-12-11 PROCEDURE — 1036F TOBACCO NON-USER: CPT | Performed by: NURSE PRACTITIONER

## 2024-12-11 PROCEDURE — G8482 FLU IMMUNIZE ORDER/ADMIN: HCPCS | Performed by: NURSE PRACTITIONER

## 2024-12-11 PROCEDURE — G8399 PT W/DXA RESULTS DOCUMENT: HCPCS | Performed by: NURSE PRACTITIONER

## 2024-12-11 PROCEDURE — 99214 OFFICE O/P EST MOD 30 MIN: CPT | Performed by: NURSE PRACTITIONER

## 2024-12-11 PROCEDURE — G8427 DOCREV CUR MEDS BY ELIG CLIN: HCPCS | Performed by: NURSE PRACTITIONER

## 2024-12-11 PROCEDURE — G8417 CALC BMI ABV UP PARAM F/U: HCPCS | Performed by: NURSE PRACTITIONER

## 2024-12-11 PROCEDURE — 1160F RVW MEDS BY RX/DR IN RCRD: CPT | Performed by: NURSE PRACTITIONER

## 2024-12-11 PROCEDURE — 3017F COLORECTAL CA SCREEN DOC REV: CPT | Performed by: NURSE PRACTITIONER

## 2024-12-11 PROCEDURE — 1159F MED LIST DOCD IN RCRD: CPT | Performed by: NURSE PRACTITIONER

## 2024-12-11 PROCEDURE — 3078F DIAST BP <80 MM HG: CPT | Performed by: NURSE PRACTITIONER

## 2024-12-11 PROCEDURE — 1090F PRES/ABSN URINE INCON ASSESS: CPT | Performed by: NURSE PRACTITIONER

## 2024-12-11 PROCEDURE — 3074F SYST BP LT 130 MM HG: CPT | Performed by: NURSE PRACTITIONER

## 2024-12-11 SDOH — ECONOMIC STABILITY: FOOD INSECURITY: WITHIN THE PAST 12 MONTHS, THE FOOD YOU BOUGHT JUST DIDN'T LAST AND YOU DIDN'T HAVE MONEY TO GET MORE.: NEVER TRUE

## 2024-12-11 SDOH — ECONOMIC STABILITY: INCOME INSECURITY: HOW HARD IS IT FOR YOU TO PAY FOR THE VERY BASICS LIKE FOOD, HOUSING, MEDICAL CARE, AND HEATING?: NOT HARD AT ALL

## 2024-12-11 SDOH — ECONOMIC STABILITY: FOOD INSECURITY: WITHIN THE PAST 12 MONTHS, YOU WORRIED THAT YOUR FOOD WOULD RUN OUT BEFORE YOU GOT MONEY TO BUY MORE.: NEVER TRUE

## 2024-12-11 ASSESSMENT — PATIENT HEALTH QUESTIONNAIRE - PHQ9
1. LITTLE INTEREST OR PLEASURE IN DOING THINGS: NOT AT ALL
2. FEELING DOWN, DEPRESSED OR HOPELESS: NOT AT ALL
SUM OF ALL RESPONSES TO PHQ QUESTIONS 1-9: 0
SUM OF ALL RESPONSES TO PHQ QUESTIONS 1-9: 0
SUM OF ALL RESPONSES TO PHQ9 QUESTIONS 1 & 2: 0
SUM OF ALL RESPONSES TO PHQ QUESTIONS 1-9: 0
SUM OF ALL RESPONSES TO PHQ QUESTIONS 1-9: 0

## 2024-12-11 NOTE — PROGRESS NOTES
Suzie Duckworth, [place] [id number]  [place]  [address]  [phone]  [phone]    Nerissa Cosme is a 69 y.o. female who is here with c/o of:    Chief Complaint: Other (Pt c/o Neck and back pain)      Patient Accompanied by: n/a    HPI - Nerissa Cosme is here today with c/o:    History of Present Illness  The patient is a 69-year-old female who presents with complaints of neck and back pain.    She reports persistent neck pain, which has been somewhat alleviated following a course of physical therapy that concluded last week. The pain is less intense now and does not worsen with certain movements as it did before. She describes the pain as mild, accompanied by a sensation of pressure. Occasionally, she experiences headaches upon waking. No further imaging or x-rays were recommended during her therapy sessions. She recalls a fall in November 2 years ago, during which she sustained an impact to the back of her neck. A subsequent CT scan of her head was normal, but she is uncertain if her current symptoms are related to this incident. The pain tends to recur when she is overworked or particularly busy.    Additionally, she experiences back pain, particularly in the morning and at night, localized around her shoulder blades. This pain typically subsides once she becomes active.         Health Maintenance Due   Topic Date Due    Hepatitis B vaccine (2 of 3 - 19+ 3-dose series) 10/29/2018    COVID-19 Vaccine (8 - 2023-24 season) 09/01/2024        Patient Active Problem List:     Asthma     Obesity     OA (osteoarthritis)     Hyperlipidemia     BPPV (benign paroxysmal positional vertigo)     GERD (gastroesophageal reflux disease)     Essential hypertension     Anaphylaxis     Other specified glaucoma     Adjustment disorder with anxiety     Other allergy status, other than to drugs and biological substances     Past Medical History:   Diagnosis Date    Asthma     Hypertension

## 2025-01-21 ENCOUNTER — TELEPHONE (OUTPATIENT)
Dept: FAMILY MEDICINE CLINIC | Age: 70
End: 2025-01-21

## 2025-01-21 DIAGNOSIS — I10 ESSENTIAL HYPERTENSION: Primary | ICD-10-CM

## 2025-01-24 ENCOUNTER — OFFICE VISIT (OUTPATIENT)
Dept: FAMILY MEDICINE CLINIC | Age: 70
End: 2025-01-24

## 2025-01-24 VITALS
SYSTOLIC BLOOD PRESSURE: 124 MMHG | TEMPERATURE: 97.5 F | BODY MASS INDEX: 41.63 KG/M2 | WEIGHT: 209.6 LBS | DIASTOLIC BLOOD PRESSURE: 62 MMHG | OXYGEN SATURATION: 96 % | HEART RATE: 102 BPM

## 2025-01-24 DIAGNOSIS — I10 ESSENTIAL HYPERTENSION: Primary | ICD-10-CM

## 2025-01-24 DIAGNOSIS — E78.5 HYPERLIPIDEMIA, UNSPECIFIED HYPERLIPIDEMIA TYPE: Chronic | ICD-10-CM

## 2025-01-24 PROBLEM — J81.0 ACUTE PULMONARY EDEMA: Status: RESOLVED | Noted: 2025-01-24 | Resolved: 2025-01-24

## 2025-01-24 PROBLEM — J81.0 ACUTE PULMONARY EDEMA: Status: ACTIVE | Noted: 2025-01-24

## 2025-01-24 RX ORDER — CALCIUM CARBONATE 600 MG
2 TABLET ORAL DAILY
COMMUNITY

## 2025-01-24 SDOH — ECONOMIC STABILITY: FOOD INSECURITY: WITHIN THE PAST 12 MONTHS, THE FOOD YOU BOUGHT JUST DIDN'T LAST AND YOU DIDN'T HAVE MONEY TO GET MORE.: NEVER TRUE

## 2025-01-24 SDOH — ECONOMIC STABILITY: FOOD INSECURITY: WITHIN THE PAST 12 MONTHS, YOU WORRIED THAT YOUR FOOD WOULD RUN OUT BEFORE YOU GOT MONEY TO BUY MORE.: NEVER TRUE

## 2025-01-24 ASSESSMENT — PATIENT HEALTH QUESTIONNAIRE - PHQ9
1. LITTLE INTEREST OR PLEASURE IN DOING THINGS: NOT AT ALL
SUM OF ALL RESPONSES TO PHQ QUESTIONS 1-9: 0
2. FEELING DOWN, DEPRESSED OR HOPELESS: NOT AT ALL
SUM OF ALL RESPONSES TO PHQ9 QUESTIONS 1 & 2: 0

## 2025-01-24 NOTE — PROGRESS NOTES
understanding.   5.  Reviewed prior labs, imaging, consultation, follow up, and health maintenance  6.  Continue current medications, diet and exercise.  7. Discussed use, benefit, and side effects of prescribed medications.  Barriers to medication compliance addressed.  All her questions were answered.  Pt voiced understanding. Nerissa will continue current medications, diet and exercise.      Of the  30  minute duration appointment visit, Suzie Duckworth CNP spent at least 50% of the face-to-face time in counseling, explanation of diagnosis, planning of further management, and answering all questions.          Signed:  Suzie Duckworth CNP    The patient (or guardian, if applicable) and other individuals in attendance with the patient were advised that Artificial Intelligence will be utilized during this visit to record, process the conversation to generate a clinical note, and support improvement of the AI technology. The patient (or guardian, if applicable) and other individuals in attendance at the appointment consented to the use of AI, including the recording.

## 2025-03-15 DIAGNOSIS — I10 ESSENTIAL HYPERTENSION: ICD-10-CM

## 2025-03-15 DIAGNOSIS — R06.09 DYSPNEA ON EXERTION: ICD-10-CM

## 2025-03-15 DIAGNOSIS — R00.0 TACHYCARDIA: ICD-10-CM

## 2025-03-17 RX ORDER — METOPROLOL SUCCINATE 25 MG/1
25 TABLET, EXTENDED RELEASE ORAL DAILY
Qty: 90 TABLET | Refills: 0 | Status: SHIPPED | OUTPATIENT
Start: 2025-03-17

## 2025-03-17 NOTE — TELEPHONE ENCOUNTER
Nerissa Cosme is calling to request a refill on the following medication(s):    Medication Request:  Requested Prescriptions     Pending Prescriptions Disp Refills    metoprolol succinate (TOPROL XL) 25 MG extended release tablet [Pharmacy Med Name: Metoprolol Succinate ER Oral Tablet Extended Release 24 Hour 25 MG] 90 tablet 0     Sig: TAKE ONE TABLET BY MOUTH ONCE A DAY       Last Visit Date (If Applicable):  1/24/2025    Next Visit Date:    7/28/2025

## 2025-04-01 NOTE — TELEPHONE ENCOUNTER
Spoke with Nerissa harrison. She is having worsening SOB, pain in her chest, and her BP is elevated over 160-170/100. Had chest pain in Jan 2023 and workup was extensive and negative for heart disease. She has anxiety and also asthma which she sees pulm for. Just started Metoprolol 12.5mg daily. Is very uncomfortable so I advised going to nearest ED for further evaluation.    No

## 2025-04-10 DIAGNOSIS — R06.09 DYSPNEA ON EXERTION: ICD-10-CM

## 2025-04-10 DIAGNOSIS — I10 ESSENTIAL HYPERTENSION: ICD-10-CM

## 2025-04-10 DIAGNOSIS — Z76.0 MEDICATION REFILL: ICD-10-CM

## 2025-04-10 DIAGNOSIS — R00.0 TACHYCARDIA: ICD-10-CM

## 2025-04-10 RX ORDER — METOPROLOL SUCCINATE 25 MG/1
25 TABLET, EXTENDED RELEASE ORAL DAILY
Qty: 90 TABLET | Refills: 1 | Status: SHIPPED | OUTPATIENT
Start: 2025-04-10

## 2025-04-10 RX ORDER — LISINOPRIL 5 MG/1
5 TABLET ORAL DAILY
Qty: 90 TABLET | Refills: 1 | Status: SHIPPED | OUTPATIENT
Start: 2025-04-10

## 2025-04-10 RX ORDER — HYDROCHLOROTHIAZIDE 50 MG/1
50 TABLET ORAL DAILY
Qty: 90 TABLET | Refills: 1 | Status: SHIPPED | OUTPATIENT
Start: 2025-04-10

## 2025-04-10 NOTE — TELEPHONE ENCOUNTER
Nerissa Cosme is calling to request a refill on the following medication(s):    Medication Request:  Requested Prescriptions     Pending Prescriptions Disp Refills    metoprolol succinate (TOPROL XL) 25 MG extended release tablet [Pharmacy Med Name: METOPROLOL SUCCINATE ER TABS 25MG] 90 tablet 1     Sig: Take 1 tablet by mouth daily    hydroCHLOROthiazide (HYDRODIURIL) 50 MG tablet [Pharmacy Med Name: HYDROCHLOROTHIAZIDE TABS 50MG] 90 tablet 1     Sig: Take 1 tablet by mouth daily    lisinopril (PRINIVIL;ZESTRIL) 5 MG tablet [Pharmacy Med Name: LISINOPRIL TABS 5MG] 90 tablet 1     Sig: Take 1 tablet by mouth daily       Last Visit Date (If Applicable):  1/24/2025    Next Visit Date:    7/28/2025

## 2025-04-11 DIAGNOSIS — I10 ESSENTIAL HYPERTENSION: ICD-10-CM

## 2025-04-11 DIAGNOSIS — R00.0 TACHYCARDIA: ICD-10-CM

## 2025-04-11 DIAGNOSIS — R06.09 DYSPNEA ON EXERTION: ICD-10-CM

## 2025-04-14 RX ORDER — METOPROLOL SUCCINATE 25 MG/1
TABLET, EXTENDED RELEASE ORAL
Refills: 0 | OUTPATIENT
Start: 2025-04-14

## 2025-04-19 DIAGNOSIS — Z76.0 MEDICATION REFILL: ICD-10-CM

## 2025-04-21 RX ORDER — POTASSIUM CHLORIDE 750 MG/1
10 CAPSULE, EXTENDED RELEASE ORAL DAILY
Qty: 90 CAPSULE | Refills: 1 | Status: SHIPPED | OUTPATIENT
Start: 2025-04-21 | End: 2025-04-24 | Stop reason: SDUPTHER

## 2025-04-21 NOTE — TELEPHONE ENCOUNTER
Nerissa Cosme is calling to request a refill on the following medication(s):    Medication Request:  Requested Prescriptions     Pending Prescriptions Disp Refills    potassium chloride (MICRO-K) 10 MEQ extended release capsule [Pharmacy Med Name: POTASSIUM CHLORIDE ER CAPS 10MEQ] 90 capsule 1     Sig: Take 1 capsule by mouth daily       Last Visit Date (If Applicable):  1/24/2025    Next Visit Date:    7/28/2025

## 2025-04-23 DIAGNOSIS — T78.2XXD ANAPHYLAXIS, SUBSEQUENT ENCOUNTER: Primary | Chronic | ICD-10-CM

## 2025-04-23 DIAGNOSIS — Z76.0 MEDICATION REFILL: ICD-10-CM

## 2025-04-23 NOTE — TELEPHONE ENCOUNTER
Patient calling stating that she is suppose to take the potassium BID?    Is asking for a new RX to be sent to [facility]?    Also states that she needs a refill on her epipen, states that the pharmacy told her that it had to come from her PCP for it to be covered    Please advise

## 2025-04-24 RX ORDER — POTASSIUM CHLORIDE 750 MG/1
10 CAPSULE, EXTENDED RELEASE ORAL 2 TIMES DAILY
Qty: 180 CAPSULE | Refills: 1 | Status: SHIPPED | OUTPATIENT
Start: 2025-04-24 | End: 2025-10-21

## 2025-04-25 RX ORDER — EPINEPHRINE 0.3 MG/.3ML
INJECTION INTRAMUSCULAR
Qty: 1 EACH | Refills: 2 | Status: SHIPPED | OUTPATIENT
Start: 2025-04-25

## 2025-05-02 ENCOUNTER — TELEPHONE (OUTPATIENT)
Dept: FAMILY MEDICINE CLINIC | Age: 70
End: 2025-05-02

## 2025-05-02 DIAGNOSIS — T78.2XXD ANAPHYLAXIS, SUBSEQUENT ENCOUNTER: Chronic | ICD-10-CM

## 2025-05-02 NOTE — TELEPHONE ENCOUNTER
Patient is asking for prescription for a generic epi pen as the GALINDO cost 700.00. patient uses [facility]

## 2025-05-05 RX ORDER — EPINEPHRINE 0.15 MG/.15ML
0.15 INJECTION SUBCUTANEOUS ONCE
Qty: 0.15 ML | Refills: 0 | Status: SHIPPED | OUTPATIENT
Start: 2025-05-05 | End: 2025-05-05

## 2025-05-14 ENCOUNTER — OFFICE VISIT (OUTPATIENT)
Dept: FAMILY MEDICINE CLINIC | Age: 70
End: 2025-05-14

## 2025-05-14 VITALS
TEMPERATURE: 97.2 F | HEART RATE: 86 BPM | OXYGEN SATURATION: 96 % | WEIGHT: 212 LBS | BODY MASS INDEX: 42.1 KG/M2 | DIASTOLIC BLOOD PRESSURE: 70 MMHG | SYSTOLIC BLOOD PRESSURE: 130 MMHG

## 2025-05-14 DIAGNOSIS — R19.7 DIARRHEA, UNSPECIFIED TYPE: Primary | ICD-10-CM

## 2025-05-14 RX ORDER — METHYLPREDNISOLONE 4 MG/1
TABLET ORAL
COMMUNITY
Start: 2025-05-02

## 2025-05-14 RX ORDER — AMOXICILLIN 500 MG/1
TABLET, FILM COATED ORAL
COMMUNITY
Start: 2025-05-04

## 2025-05-14 RX ORDER — BUDESONIDE AND FORMOTEROL FUMARATE 160; 4.5 UG/1; UG/1
AEROSOL, METERED RESPIRATORY (INHALATION)
COMMUNITY
Start: 2025-04-11

## 2025-05-14 SDOH — ECONOMIC STABILITY: FOOD INSECURITY: WITHIN THE PAST 12 MONTHS, THE FOOD YOU BOUGHT JUST DIDN'T LAST AND YOU DIDN'T HAVE MONEY TO GET MORE.: NEVER TRUE

## 2025-05-14 SDOH — ECONOMIC STABILITY: FOOD INSECURITY: WITHIN THE PAST 12 MONTHS, YOU WORRIED THAT YOUR FOOD WOULD RUN OUT BEFORE YOU GOT MONEY TO BUY MORE.: NEVER TRUE

## 2025-05-14 ASSESSMENT — PATIENT HEALTH QUESTIONNAIRE - PHQ9
SUM OF ALL RESPONSES TO PHQ QUESTIONS 1-9: 0
1. LITTLE INTEREST OR PLEASURE IN DOING THINGS: NOT AT ALL
2. FEELING DOWN, DEPRESSED OR HOPELESS: NOT AT ALL
SUM OF ALL RESPONSES TO PHQ QUESTIONS 1-9: 0

## 2025-05-14 ASSESSMENT — ENCOUNTER SYMPTOMS
DIARRHEA: 1
ABDOMINAL PAIN: 1

## 2025-05-14 NOTE — PROGRESS NOTES
Suzie Duckworth, [facility]  [address], [address]  [place]  [address]  [phone]  [phone]    Nerissa Cosme is a 70 y.o. female who is here with c/o of:    Chief Complaint: Abdominal Pain (Pt c/o Abd cramping with Diarrhea x 3 days)      Patient Accompanied by: n/a    HPI - Nerissa Cosme is here today with c/o:    History of Present Illness  The patient is a 70-year-old female who presents with complaints of abdominal pain and diarrhea.    She experienced an episode of mild abdominal discomfort on the Saturday before Easter, which escalated to severe diarrhea by Easter Sunday. This episode lasted for two days, after which she recovered. However, three days ago, she began experiencing similar symptoms following the consumption of spaghetti. She has been experiencing severe, watery diarrhea for the past three days. Her last episode of diarrhea was at 5:00 AM today. She has been maintaining hydration with water and Pedialyte and consuming bananas. She also had a small amount of pancake today. She has been applying Vaseline to her perianal area due to rawness and associated pain. She has not observed any blood in her stools, which have been consistently yellow and brown. She has been managing her symptoms with Imodium, taken at 2:00 AM last night. She has not been on any recent antibiotic therapy. She acknowledges that her dietary habits could be improved. She reports no fevers, chills, or body aches. She experienced significant gas passage last night, accompanied by loud stomach noises. She has recently refilled all her supplements and is concerned about potential gastrointestinal upset. Today, she only took her blood pressure and asthma medications.    She is scheduled for oral surgery for an implant and will be starting steroids and antibiotics.    FAMILY HISTORY  Her mother passed away from C. difficile.       Health Maintenance Due   Topic Date Due    Hepatitis B vaccine (2 of 3 
IV antibiotics, IV hydration, monitor vitals, supportive care, AM labs, general observation care / monitoring.

## 2025-06-23 DIAGNOSIS — Z76.0 MEDICATION REFILL: ICD-10-CM

## 2025-06-23 RX ORDER — PANTOPRAZOLE SODIUM 40 MG/1
TABLET, DELAYED RELEASE ORAL
Qty: 90 TABLET | Refills: 1 | Status: SHIPPED | OUTPATIENT
Start: 2025-06-23

## 2025-07-18 ENCOUNTER — APPOINTMENT (OUTPATIENT)
Dept: GENERAL RADIOLOGY | Facility: CLINIC | Age: 70
End: 2025-07-18
Attending: EMERGENCY MEDICINE
Payer: MEDICARE

## 2025-07-18 ENCOUNTER — HOSPITAL ENCOUNTER (EMERGENCY)
Facility: CLINIC | Age: 70
Discharge: HOME | End: 2025-07-18
Attending: EMERGENCY MEDICINE
Payer: MEDICARE

## 2025-07-18 VITALS
OXYGEN SATURATION: 95 % | BODY MASS INDEX: 41.62 KG/M2 | WEIGHT: 212 LBS | HEIGHT: 60 IN | HEART RATE: 69 BPM | TEMPERATURE: 98.2 F | RESPIRATION RATE: 18 BRPM | DIASTOLIC BLOOD PRESSURE: 74 MMHG | SYSTOLIC BLOOD PRESSURE: 174 MMHG

## 2025-07-18 DIAGNOSIS — M77.31 HEEL SPUR, RIGHT: Primary | ICD-10-CM

## 2025-07-18 DIAGNOSIS — M72.2 PLANTAR FASCIITIS OF RIGHT FOOT: ICD-10-CM

## 2025-07-18 PROCEDURE — 73630 X-RAY EXAM OF FOOT: CPT

## 2025-07-18 PROCEDURE — 99284 EMERGENCY DEPT VISIT MOD MDM: CPT

## 2025-07-18 PROCEDURE — 96372 THER/PROPH/DIAG INJ SC/IM: CPT

## 2025-07-18 PROCEDURE — 6360000002 HC RX W HCPCS: Performed by: EMERGENCY MEDICINE

## 2025-07-18 RX ORDER — IBUPROFEN 800 MG/1
800 TABLET, FILM COATED ORAL EVERY 8 HOURS PRN
Qty: 30 TABLET | Refills: 0 | Status: SHIPPED | OUTPATIENT
Start: 2025-07-18

## 2025-07-18 RX ORDER — KETOROLAC TROMETHAMINE 30 MG/ML
30 INJECTION, SOLUTION INTRAMUSCULAR; INTRAVENOUS ONCE
Status: COMPLETED | OUTPATIENT
Start: 2025-07-18 | End: 2025-07-18

## 2025-07-18 RX ADMIN — KETOROLAC TROMETHAMINE 30 MG: 30 INJECTION, SOLUTION INTRAMUSCULAR at 15:38

## 2025-07-18 ASSESSMENT — ENCOUNTER SYMPTOMS
SHORTNESS OF BREATH: 0
BACK PAIN: 0
NAUSEA: 0
ABDOMINAL PAIN: 0
VOMITING: 0
COUGH: 0
DIARRHEA: 0

## 2025-07-18 ASSESSMENT — PAIN - FUNCTIONAL ASSESSMENT: PAIN_FUNCTIONAL_ASSESSMENT: 0-10

## 2025-07-18 ASSESSMENT — PAIN DESCRIPTION - ORIENTATION: ORIENTATION: RIGHT

## 2025-07-18 ASSESSMENT — PAIN DESCRIPTION - LOCATION: LOCATION: FOOT

## 2025-07-18 ASSESSMENT — PAIN SCALES - GENERAL: PAINLEVEL_OUTOF10: 10

## 2025-07-18 NOTE — ED PROVIDER NOTES
[facility]  [address] [facility] [address]  Phone: [phone]        Pt Name: Nerissa Cosme  MRN: [id number]  Birthdate 1955  Date of evaluation: 25      CHIEF COMPLAINT       Chief Complaint   Patient presents with    Foot Pain     Right heel, patient had a conference all day yesterday and may have stressed her heel.          HISTORY OF PRESENT ILLNESS    Nerissa Cosme is a 70 y.o. female who presents with chief complaint of right heel discomfort patient had been at a conference all day yesterday did a lot of standing and walking and her right heel started to hurt last night there is no pain in the calf or thigh no history of injuries but she says it hurts to bear weight no fevers or chills cough or shortness of breath she has had a previous injury of the left foot she is not a diabetic      REVIEW OF SYSTEMS         Review of Systems   Constitutional:  Negative for chills and fever.   Respiratory:  Negative for cough and shortness of breath.    Cardiovascular:  Negative for chest pain, palpitations and leg swelling.   Gastrointestinal:  Negative for abdominal pain, diarrhea, nausea and vomiting.   Endocrine: Negative for polydipsia and polyuria.   Genitourinary:  Negative for difficulty urinating.   Musculoskeletal:  Positive for arthralgias. Negative for back pain, joint swelling, myalgias, neck pain and neck stiffness.        Right heel pain   Skin:  Negative for rash.   Neurological:  Negative for dizziness, weakness and headaches.   Hematological:  Negative for adenopathy. Does not bruise/bleed easily.   Psychiatric/Behavioral:  Negative for confusion, self-injury and suicidal ideas.          PAST MEDICAL HISTORY    has a past medical history of Asthma, Hypertension, OA (osteoarthritis), and Obesity.    SURGICAL HISTORY      has a past surgical history that includes Colonoscopy; Ear surgery (Left); Cholecystectomy; and  section.    CURRENT MEDICATIONS       Previous

## 2025-08-21 ENCOUNTER — TELEPHONE (OUTPATIENT)
Dept: FAMILY MEDICINE CLINIC | Age: 70
End: 2025-08-21

## 2025-08-21 ENCOUNTER — OFFICE VISIT (OUTPATIENT)
Dept: FAMILY MEDICINE CLINIC | Age: 70
End: 2025-08-21

## 2025-08-21 VITALS
SYSTOLIC BLOOD PRESSURE: 110 MMHG | WEIGHT: 210.4 LBS | HEART RATE: 85 BPM | BODY MASS INDEX: 41.09 KG/M2 | TEMPERATURE: 97.8 F | DIASTOLIC BLOOD PRESSURE: 78 MMHG | OXYGEN SATURATION: 98 %

## 2025-08-21 DIAGNOSIS — Z12.31 ENCOUNTER FOR SCREENING MAMMOGRAM FOR BREAST CANCER: ICD-10-CM

## 2025-08-21 DIAGNOSIS — Z00.00 MEDICARE ANNUAL WELLNESS VISIT, SUBSEQUENT: Primary | ICD-10-CM

## 2025-08-21 DIAGNOSIS — I10 ESSENTIAL HYPERTENSION: ICD-10-CM

## 2025-08-21 RX ORDER — CALCIUM CARBONATE 500(1250)
500 TABLET,CHEWABLE ORAL DAILY
COMMUNITY

## 2025-08-21 ASSESSMENT — LIFESTYLE VARIABLES
HOW OFTEN DO YOU HAVE A DRINK CONTAINING ALCOHOL: NEVER
HOW MANY STANDARD DRINKS CONTAINING ALCOHOL DO YOU HAVE ON A TYPICAL DAY: PATIENT DOES NOT DRINK

## 2025-08-21 ASSESSMENT — PATIENT HEALTH QUESTIONNAIRE - PHQ9
1. LITTLE INTEREST OR PLEASURE IN DOING THINGS: NOT AT ALL
2. FEELING DOWN, DEPRESSED OR HOPELESS: NOT AT ALL
SUM OF ALL RESPONSES TO PHQ QUESTIONS 1-9: 0

## 2025-08-30 DIAGNOSIS — Z76.0 MEDICATION REFILL: ICD-10-CM

## 2025-09-02 RX ORDER — LISINOPRIL 5 MG/1
5 TABLET ORAL DAILY
Qty: 90 TABLET | Refills: 3 | Status: SHIPPED | OUTPATIENT
Start: 2025-09-02